# Patient Record
Sex: FEMALE | Race: WHITE | NOT HISPANIC OR LATINO | Employment: STUDENT | URBAN - NONMETROPOLITAN AREA
[De-identification: names, ages, dates, MRNs, and addresses within clinical notes are randomized per-mention and may not be internally consistent; named-entity substitution may affect disease eponyms.]

---

## 2021-05-10 ENCOUNTER — OFFICE VISIT (OUTPATIENT)
Dept: CARDIOLOGY | Facility: CLINIC | Age: 21
End: 2021-05-10

## 2021-05-10 VITALS
BODY MASS INDEX: 45.99 KG/M2 | DIASTOLIC BLOOD PRESSURE: 74 MMHG | HEART RATE: 86 BPM | SYSTOLIC BLOOD PRESSURE: 128 MMHG | WEIGHT: 293 LBS | OXYGEN SATURATION: 98 % | HEIGHT: 67 IN

## 2021-05-10 DIAGNOSIS — R07.2 PRECORDIAL PAIN: Primary | ICD-10-CM

## 2021-05-10 DIAGNOSIS — E66.01 MORBID OBESITY (HCC): ICD-10-CM

## 2021-05-10 DIAGNOSIS — R00.2 PALPITATIONS: ICD-10-CM

## 2021-05-10 DIAGNOSIS — R06.02 SOB (SHORTNESS OF BREATH): ICD-10-CM

## 2021-05-10 DIAGNOSIS — E78.5 DYSLIPIDEMIA: ICD-10-CM

## 2021-05-10 PROCEDURE — 99204 OFFICE O/P NEW MOD 45 MIN: CPT | Performed by: INTERNAL MEDICINE

## 2021-05-10 PROCEDURE — 93000 ELECTROCARDIOGRAM COMPLETE: CPT | Performed by: INTERNAL MEDICINE

## 2021-05-10 RX ORDER — FERROUS SULFATE 325(65) MG
1 TABLET ORAL DAILY
COMMUNITY
Start: 2021-05-02 | End: 2022-02-02

## 2021-05-10 RX ORDER — PANTOPRAZOLE SODIUM 40 MG/1
40 TABLET, DELAYED RELEASE ORAL DAILY
COMMUNITY
Start: 2021-04-26 | End: 2022-01-27 | Stop reason: SDUPTHER

## 2021-05-10 RX ORDER — BACLOFEN 10 MG/1
TABLET ORAL EVERY 12 HOURS SCHEDULED
COMMUNITY
Start: 2019-09-27 | End: 2022-02-02

## 2021-05-10 RX ORDER — LORATADINE 10 MG/1
10 TABLET ORAL DAILY
COMMUNITY
Start: 2021-02-25 | End: 2022-01-27 | Stop reason: SDUPTHER

## 2021-05-10 RX ORDER — CLINDAMYCIN PHOSPHATE 10 MG/G
GEL TOPICAL
COMMUNITY
Start: 2021-02-25 | End: 2022-02-02

## 2021-05-10 RX ORDER — NORGESTIMATE AND ETHINYL ESTRADIOL 7DAYSX3 28
KIT ORAL
COMMUNITY
Start: 2019-09-27 | End: 2022-01-27 | Stop reason: SDUPTHER

## 2021-05-10 RX ORDER — IBUPROFEN 800 MG/1
TABLET ORAL EVERY 12 HOURS SCHEDULED
COMMUNITY
Start: 2019-09-27 | End: 2022-03-31

## 2021-05-10 RX ORDER — FERROUS SULFATE 325(65) MG
1 TABLET ORAL 2 TIMES DAILY
COMMUNITY
Start: 2021-03-02 | End: 2022-01-27

## 2021-05-10 NOTE — PROGRESS NOTES
Subjective:     Encounter Date:05/10/2021      Patient ID: Elayne Hernandez is a 20 y.o. female.    Chief Complaint: Chest pain  HPI  This is a 20-year-old female patient who has been experiencing chest discomfort off and on since 2019.  The patient describes a retrosternal tightness which does not radiate.  The discomfort occurs on a daily basis.  It typically has a 3/10 intensity.  The discomfort is associated with shortness of breath and lightheadedness.  The discomfort occurs mostly with exertional activities and improves with rest.  The discomfort will last anywhere from a few minutes to several hours.  The discomfort has been occurring on a daily basis and will often occur multiple times per day.  The patient also reports shortness of breath with activity also beginning in 2019.  The patient indicates the shortness of breath is worse with exertional activities and improves with rest.  She has had no orthopnea PND or lower extremity edema.  The patient reports having palpitations for quite some time.  She cannot recall the first time she had palpitations but they have gotten much more severe since March of this year.  She describes a sense that her heart is racing and skipping beats.  It tends to occur at random lasting for 1 to 2 minutes.  It tends to improve when she lays down.  She has no history of documented arrhythmia and has never worn a cardiac monitor.  She has no history of hypertension diabetes or dyslipidemia.  She is a non-smoker.  The following portions of the patient's history were reviewed and updated as appropriate: allergies, current medications, past family history, past medical history, past social history, past surgical history and problem  Review of Systems   Constitutional: Negative for chills, diaphoresis, fever, malaise/fatigue, weight gain and weight loss.   HENT: Negative for ear discharge, hearing loss, hoarse voice and nosebleeds.    Eyes: Negative for discharge, double vision,  pain and photophobia.   Cardiovascular: Positive for chest pain, dyspnea on exertion, irregular heartbeat and palpitations. Negative for claudication, cyanosis, leg swelling, near-syncope, orthopnea, paroxysmal nocturnal dyspnea and syncope.   Respiratory: Negative for cough, hemoptysis, sputum production and wheezing.    Endocrine: Negative for cold intolerance, heat intolerance, polydipsia, polyphagia and polyuria.   Hematologic/Lymphatic: Negative for adenopathy and bleeding problem. Does not bruise/bleed easily.   Skin: Negative for color change, flushing, itching and rash.   Musculoskeletal: Negative for muscle cramps, muscle weakness, myalgias and stiffness.   Gastrointestinal: Negative for abdominal pain, diarrhea, hematemesis, hematochezia, nausea and vomiting.   Genitourinary: Negative for dysuria, frequency and nocturia.   Neurological: Positive for dizziness and light-headedness. Negative for focal weakness, loss of balance, numbness, paresthesias and seizures.   Psychiatric/Behavioral: Negative for altered mental status, hallucinations and suicidal ideas.   Allergic/Immunologic: Negative for HIV exposure, hives and persistent infections.           Current Outpatient Medications:   •  baclofen (LIORESAL) 10 MG tablet, Take  by mouth Every 12 (Twelve) Hours., Disp: , Rfl:   •  clindamycin (CLINDAGEL) 1 % gel, APPLY A THIN LAYER TOPICALLY TO AFFECTED AREA TWICE DAILY, Disp: , Rfl:   •  FeroSul 325 (65 Fe) MG tablet, Take 1 tablet by mouth 2 (Two) Times a Day., Disp: , Rfl:   •  ibuprofen (ADVIL,MOTRIN) 800 MG tablet, Take  by mouth Every 12 (Twelve) Hours., Disp: , Rfl:   •  loratadine (CLARITIN) 10 MG tablet, Take 10 mg by mouth Daily., Disp: , Rfl:   •  norgestimate-ethinyl estradiol (Tri-Sprintec) 0.18/0.215/0.25 MG-35 MCG per tablet, Take  by mouth., Disp: , Rfl:   •  pantoprazole (PROTONIX) 40 MG EC tablet, Take 40 mg by mouth Daily., Disp: , Rfl:   •  sertraline (ZOLOFT) 50 MG tablet, Take 50 mg by  "mouth Daily., Disp: , Rfl:   •  Vitamin D3 Maximum Strength 125 MCG (5000 UT) capsule capsule, Take 1 capsule by mouth Daily., Disp: , Rfl:     Objective:   Vitals and nursing note reviewed.   Constitutional:       Appearance: Healthy appearance. Not in distress.   Neck:      Vascular: No JVR. JVD normal.   Pulmonary:      Effort: Pulmonary effort is normal.      Breath sounds: Normal breath sounds. No wheezing. No rhonchi. No rales.   Chest:      Chest wall: Not tender to palpatation.   Cardiovascular:      PMI at left midclavicular line. Normal rate. Regular rhythm. Normal S1. Normal S2.      Murmurs: There is no murmur.      No gallop. No click. No rub.   Pulses:     Intact distal pulses.   Edema:     Peripheral edema absent.   Abdominal:      General: Bowel sounds are normal.      Palpations: Abdomen is soft.      Tenderness: There is no abdominal tenderness.   Musculoskeletal: Normal range of motion.         General: No tenderness. Skin:     General: Skin is warm and dry.   Neurological:      General: No focal deficit present.      Mental Status: Alert and oriented to person, place and time.       Blood pressure 128/74, pulse 86, height 170.2 cm (67\"), weight 133 kg (294 lb), SpO2 98 %.   Lab Review:     Assessment:       1. Precordial pain  Atypical chest pain.  - Treadmill Stress Test  - Adult Transthoracic Echo Complete W/ Cont if Necessary Per Protocol    2. Dyslipidemia  The patient has a history of elevated triglycerides but is not on any medication.  - Treadmill Stress Test  - Adult Transthoracic Echo Complete W/ Cont if Necessary Per Protocol    3. SOB (shortness of breath)  Multifactorial.  - Treadmill Stress Test  - Adult Transthoracic Echo Complete W/ Cont if Necessary Per Protocol    4. Palpitations  Possible arrhythmia versus symptomatic ectopy.  - Treadmill Stress Test  - Adult Transthoracic Echo Complete W/ Cont if Necessary Per Protocol  - Holter Monitor - 72 Hour Up To 15 Days    5. Morbid " obesity (CMS/HCC)  Body mass index is greater than 46.  The obesity pattern is truncal.  This is due to excess calorie intake.      ECG 12 Lead    Date/Time: 5/10/2021 11:17 AM  Performed by: Diego Coyne MD  Authorized by: Diego Coyne MD   Previous ECG: no previous ECG available  Rhythm: sinus rhythm  Rate: normal  QRS axis: normal    Clinical impression: normal ECG            Plan:     I have recommended a treadmill exercise stress test.  I have recommended an echocardiogram.  I have recommended an outpatient cardiac monitor.  No changes to her medications have been made at today's visit.  Further recommendations will be predicated on the results of her outpatient testing results.

## 2021-07-01 LAB
BH CV STRESS BP STAGE 1: NORMAL
BH CV STRESS DURATION MIN STAGE 1: 3
BH CV STRESS DURATION MIN STAGE 2: 3
BH CV STRESS DURATION SEC STAGE 1: 0
BH CV STRESS DURATION SEC STAGE 2: 0
BH CV STRESS GRADE STAGE 1: 10
BH CV STRESS GRADE STAGE 2: 12
BH CV STRESS HR STAGE 1: 170
BH CV STRESS HR STAGE 2: 186
BH CV STRESS METS STAGE 1: 5
BH CV STRESS METS STAGE 2: 7.5
BH CV STRESS O2 STAGE 1: 98
BH CV STRESS O2 STAGE 2: 96
BH CV STRESS PROTOCOL 1: NORMAL
BH CV STRESS RECOVERY BP: NORMAL MMHG
BH CV STRESS RECOVERY HR: 116 BPM
BH CV STRESS RECOVERY O2: 97 %
BH CV STRESS SPEED STAGE 1: 1.7
BH CV STRESS SPEED STAGE 2: 2.5
BH CV STRESS STAGE 1: 1
BH CV STRESS STAGE 2: 2
MAXIMAL PREDICTED HEART RATE: 201 BPM
PERCENT MAX PREDICTED HR: 92.54 %
STRESS BASELINE BP: NORMAL MMHG
STRESS BASELINE HR: 103 BPM
STRESS O2 SAT REST: 98 %
STRESS PERCENT HR: 109 %
STRESS POST ESTIMATED WORKLOAD: 7 METS
STRESS POST EXERCISE DUR MIN: 4 MIN
STRESS POST EXERCISE DUR SEC: 25 SEC
STRESS POST O2 SAT PEAK: 96 %
STRESS POST PEAK BP: NORMAL MMHG
STRESS POST PEAK HR: 186 BPM
STRESS TARGET HR: 171 BPM

## 2021-07-17 ENCOUNTER — HOSPITAL ENCOUNTER (EMERGENCY)
Facility: HOSPITAL | Age: 21
Discharge: HOME | End: 2021-07-17
Attending: EMERGENCY MEDICINE | Admitting: EMERGENCY MEDICINE

## 2021-07-17 VITALS
TEMPERATURE: 98.4 F | SYSTOLIC BLOOD PRESSURE: 155 MMHG | BODY MASS INDEX: 45.99 KG/M2 | RESPIRATION RATE: 16 BRPM | HEART RATE: 83 BPM | HEIGHT: 67 IN | WEIGHT: 293 LBS | DIASTOLIC BLOOD PRESSURE: 97 MMHG | OXYGEN SATURATION: 99 %

## 2021-07-17 DIAGNOSIS — Z76.0 MEDICATION REFILL: Primary | ICD-10-CM

## 2021-07-17 PROCEDURE — 99282 EMERGENCY DEPT VISIT SF MDM: CPT

## 2021-07-17 NOTE — ED PROVIDER NOTES
Subjective   History of Present Illness  Is a 20-year-old female that comes in today complaining of needing a medication refill.  She reports she has been out of her Zoloft for the past 3 days.  She is tearful during her interview.  She denies any homicidal or suicidal ideations.  Review of Systems   Constitutional: Negative.    HENT: Negative.    Eyes: Negative.    Respiratory: Negative.    Cardiovascular: Negative.    Gastrointestinal: Negative.    Genitourinary: Negative.    Skin: Negative.    Neurological: Negative.    Psychiatric/Behavioral: Negative.        Past Medical History:   Diagnosis Date   • Depression        Allergies   Allergen Reactions   • Augmentin [Amoxicillin-Pot Clavulanate] Rash   • Levofloxacin Rash       History reviewed. No pertinent surgical history.    History reviewed. No pertinent family history.    Social History     Socioeconomic History   • Marital status:      Spouse name: Not on file   • Number of children: Not on file   • Years of education: Not on file   • Highest education level: Not on file   Tobacco Use   • Smoking status: Never Smoker   • Smokeless tobacco: Never Used   Substance and Sexual Activity   • Alcohol use: Never   • Drug use: Never   • Sexual activity: Defer           Objective   Physical Exam  Vitals and nursing note reviewed.   Constitutional:       Appearance: Normal appearance. She is normal weight.   Neurological:      Mental Status: She is alert.     GEN: No acute distress  Head: Normocephalic, atraumatic  Eyes: Pupils equal round reactive to light  ENT: Posterior pharynx normal in appearance, oral mucosa is moist  Chest: Nontender to palpation  Cardiovascular: Regular rate  Lungs: Clear to auscultation bilaterally  Abdomen: Soft, nontender, nondistended, no peritoneal signs  Extremities: No edema, normal appearance  Neuro: GCS 15  Psych: Mood and affect are appropriate      Procedures           ED Course                                            MDM  Number of Diagnoses or Management Options     Amount and/or Complexity of Data Reviewed  Review and summarize past medical records: yes  Discuss the patient with other providers: yes    Risk of Complications, Morbidity, and/or Mortality  Presenting problems: low  Diagnostic procedures: minimal  Management options: low        Final diagnoses:   Medication refill       ED Disposition  ED Disposition     ED Disposition Condition Comment    Discharge Stable           Shanae Uriostegui, VALENTINE  [address] [address] [phone]    Schedule an appointment as soon as possible for a visit            Where to Get Your Medications      These medications were sent to [facility] [address] [place] [address] - [address] [place] [address] [phone] [facility] [phone]   [address] [place] [id number]    Phone: [phone]   · sertraline 50 MG tablet        Medication List      No changes were made to your prescriptions during this visit.          Cecille Barone, APRN  07/17/21 1236

## 2021-07-26 LAB
MAXIMAL PREDICTED HEART RATE: 201 BPM
STRESS TARGET HR: 171 BPM

## 2021-09-02 LAB
BH CV ECHO MEAS - % IVS THICK: 50 %
BH CV ECHO MEAS - % LVPW THICK: 36.4 %
BH CV ECHO MEAS - AO MAX PG (FULL): 5.1 MMHG
BH CV ECHO MEAS - AO MAX PG: 9 MMHG
BH CV ECHO MEAS - AO MEAN PG (FULL): 3 MMHG
BH CV ECHO MEAS - AO MEAN PG: 5 MMHG
BH CV ECHO MEAS - AO ROOT AREA (BSA CORRECTED): 0.8
BH CV ECHO MEAS - AO ROOT AREA: 2.8 CM^2
BH CV ECHO MEAS - AO ROOT DIAM: 1.9 CM
BH CV ECHO MEAS - AO V2 MAX: 147.5 CM/SEC
BH CV ECHO MEAS - AO V2 MEAN: 101 CM/SEC
BH CV ECHO MEAS - AO V2 VTI: 28.5 CM
BH CV ECHO MEAS - AVA(I,A): 1.9 CM^2
BH CV ECHO MEAS - AVA(I,D): 1.9 CM^2
BH CV ECHO MEAS - AVA(V,A): 2.1 CM^2
BH CV ECHO MEAS - AVA(V,D): 2.1 CM^2
BH CV ECHO MEAS - BSA(HAYCOCK): 2.6 M^2
BH CV ECHO MEAS - BSA: 2.4 M^2
BH CV ECHO MEAS - BZI_BMI: 45.9 KILOGRAMS/M^2
BH CV ECHO MEAS - BZI_METRIC_HEIGHT: 170.2 CM
BH CV ECHO MEAS - BZI_METRIC_WEIGHT: 132.9 KG
BH CV ECHO MEAS - EDV(CUBED): 136.6 ML
BH CV ECHO MEAS - EDV(MOD-SP4): 178 ML
BH CV ECHO MEAS - EDV(TEICH): 126.6 ML
BH CV ECHO MEAS - EF(CUBED): 74.9 %
BH CV ECHO MEAS - EF(MOD-SP4): 62.9 %
BH CV ECHO MEAS - EF(TEICH): 66.4 %
BH CV ECHO MEAS - ESV(CUBED): 34.3 ML
BH CV ECHO MEAS - ESV(MOD-SP4): 66 ML
BH CV ECHO MEAS - ESV(TEICH): 42.5 ML
BH CV ECHO MEAS - FS: 36.9 %
BH CV ECHO MEAS - IVS/LVPW: 0.82
BH CV ECHO MEAS - IVSD: 0.9 CM
BH CV ECHO MEAS - IVSS: 1.4 CM
BH CV ECHO MEAS - LA DIMENSION: 4 CM
BH CV ECHO MEAS - LA/AO: 2.1
BH CV ECHO MEAS - LAD MAJOR: 5.6 CM
BH CV ECHO MEAS - LAT PEAK E' VEL: 17.8 CM/SEC
BH CV ECHO MEAS - LATERAL E/E' RATIO: 5
BH CV ECHO MEAS - LV DIASTOLIC VOL/BSA (35-75): 74.8 ML/M^2
BH CV ECHO MEAS - LV IVRT: 0.12 SEC
BH CV ECHO MEAS - LV MASS(C)D: 191.1 GRAMS
BH CV ECHO MEAS - LV MASS(C)DI: 80.3 GRAMS/M^2
BH CV ECHO MEAS - LV MASS(C)S: 160.9 GRAMS
BH CV ECHO MEAS - LV MASS(C)SI: 67.6 GRAMS/M^2
BH CV ECHO MEAS - LV MAX PG: 3.9 MMHG
BH CV ECHO MEAS - LV MEAN PG: 2 MMHG
BH CV ECHO MEAS - LV SYSTOLIC VOL/BSA (12-30): 27.8 ML/M^2
BH CV ECHO MEAS - LV V1 MAX: 98.8 CM/SEC
BH CV ECHO MEAS - LV V1 MEAN: 57.7 CM/SEC
BH CV ECHO MEAS - LV V1 VTI: 17.2 CM
BH CV ECHO MEAS - LVIDD: 5.2 CM
BH CV ECHO MEAS - LVIDS: 3.3 CM
BH CV ECHO MEAS - LVLD AP4: 9.1 CM
BH CV ECHO MEAS - LVLS AP4: 7.5 CM
BH CV ECHO MEAS - LVOT AREA (M): 3.1 CM^2
BH CV ECHO MEAS - LVOT AREA: 3.1 CM^2
BH CV ECHO MEAS - LVOT DIAM: 2 CM
BH CV ECHO MEAS - LVPWD: 1.1 CM
BH CV ECHO MEAS - LVPWS: 1.5 CM
BH CV ECHO MEAS - MED PEAK E' VEL: 9.8 CM/SEC
BH CV ECHO MEAS - MEDIAL E/E' RATIO: 9.1
BH CV ECHO MEAS - MV A MAX VEL: 51.6 CM/SEC
BH CV ECHO MEAS - MV DEC SLOPE: 396.5 CM/SEC^2
BH CV ECHO MEAS - MV DEC TIME: 0.21 SEC
BH CV ECHO MEAS - MV E MAX VEL: 89.4 CM/SEC
BH CV ECHO MEAS - MV E/A: 1.7
BH CV ECHO MEAS - MV MAX PG: 4.2 MMHG
BH CV ECHO MEAS - MV MEAN PG: 2 MMHG
BH CV ECHO MEAS - MV P1/2T MAX VEL: 84.6 CM/SEC
BH CV ECHO MEAS - MV P1/2T: 62.5 MSEC
BH CV ECHO MEAS - MV V2 MAX: 102 CM/SEC
BH CV ECHO MEAS - MV V2 MEAN: 62.1 CM/SEC
BH CV ECHO MEAS - MV V2 VTI: 27.4 CM
BH CV ECHO MEAS - MVA P1/2T LCG: 2.6 CM^2
BH CV ECHO MEAS - MVA(P1/2T): 3.5 CM^2
BH CV ECHO MEAS - MVA(VTI): 2 CM^2
BH CV ECHO MEAS - PA MAX PG: 2.2 MMHG
BH CV ECHO MEAS - PA V2 MAX: 73.6 CM/SEC
BH CV ECHO MEAS - RAP SYSTOLE: 3 MMHG
BH CV ECHO MEAS - RVSP: 23 MMHG
BH CV ECHO MEAS - SI(AO): 34 ML/M^2
BH CV ECHO MEAS - SI(CUBED): 43 ML/M^2
BH CV ECHO MEAS - SI(LVOT): 22.7 ML/M^2
BH CV ECHO MEAS - SI(MOD-SP4): 47.1 ML/M^2
BH CV ECHO MEAS - SI(TEICH): 35.4 ML/M^2
BH CV ECHO MEAS - SV(AO): 80.8 ML
BH CV ECHO MEAS - SV(CUBED): 102.3 ML
BH CV ECHO MEAS - SV(LVOT): 54 ML
BH CV ECHO MEAS - SV(MOD-SP4): 112 ML
BH CV ECHO MEAS - SV(TEICH): 84.1 ML
BH CV ECHO MEAS - TR MAX PG: 20 MMHG
BH CV ECHO MEAS - TR MAX VEL: 221.5 CM/SEC
BH CV ECHO MEAS - TV MAX PG: 2.3 MMHG
BH CV ECHO MEAS - TV V2 MAX: 75.6 CM/SEC
BH CV ECHO MEASUREMENTS AVERAGE E/E' RATIO: 6.48
LEFT ATRIUM VOLUME INDEX: 17.7 ML/M^2
LEFT ATRIUM VOLUME: 42 ML
LV EF 2D ECHO EST: 66 %

## 2022-01-27 ENCOUNTER — OFFICE VISIT (OUTPATIENT)
Dept: INTERNAL MEDICINE | Facility: CLINIC | Age: 22
End: 2022-01-27

## 2022-01-27 VITALS
DIASTOLIC BLOOD PRESSURE: 82 MMHG | OXYGEN SATURATION: 98 % | WEIGHT: 293 LBS | HEIGHT: 67 IN | TEMPERATURE: 98.4 F | SYSTOLIC BLOOD PRESSURE: 128 MMHG | BODY MASS INDEX: 45.99 KG/M2 | HEART RATE: 109 BPM

## 2022-01-27 DIAGNOSIS — D50.9 IRON DEFICIENCY ANEMIA, UNSPECIFIED IRON DEFICIENCY ANEMIA TYPE: ICD-10-CM

## 2022-01-27 DIAGNOSIS — Z76.89 ENCOUNTER TO ESTABLISH CARE: Primary | ICD-10-CM

## 2022-01-27 DIAGNOSIS — M79.7 FIBROMYALGIA: ICD-10-CM

## 2022-01-27 DIAGNOSIS — F43.10 PTSD (POST-TRAUMATIC STRESS DISORDER): ICD-10-CM

## 2022-01-27 DIAGNOSIS — Z23 NEED FOR COVID-19 VACCINE: ICD-10-CM

## 2022-01-27 DIAGNOSIS — E66.01 MORBID OBESITY: ICD-10-CM

## 2022-01-27 DIAGNOSIS — F33.1 MODERATE EPISODE OF RECURRENT MAJOR DEPRESSIVE DISORDER: ICD-10-CM

## 2022-01-27 DIAGNOSIS — E78.5 DYSLIPIDEMIA: ICD-10-CM

## 2022-01-27 DIAGNOSIS — R41.840 CONCENTRATION DEFICIT: ICD-10-CM

## 2022-01-27 DIAGNOSIS — Z30.011 ENCOUNTER FOR INITIAL PRESCRIPTION OF CONTRACEPTIVE PILLS: ICD-10-CM

## 2022-01-27 LAB
B-HCG UR QL: NEGATIVE
EXPIRATION DATE: NORMAL
INTERNAL NEGATIVE CONTROL: NORMAL
INTERNAL POSITIVE CONTROL: NORMAL
Lab: NORMAL

## 2022-01-27 PROCEDURE — 0001A COVID-19 (PFIZER): CPT | Performed by: NURSE PRACTITIONER

## 2022-01-27 PROCEDURE — 81025 URINE PREGNANCY TEST: CPT | Performed by: NURSE PRACTITIONER

## 2022-01-27 PROCEDURE — 99204 OFFICE O/P NEW MOD 45 MIN: CPT | Performed by: NURSE PRACTITIONER

## 2022-01-27 PROCEDURE — 91300 COVID-19 (PFIZER): CPT | Performed by: NURSE PRACTITIONER

## 2022-01-27 RX ORDER — NORGESTIMATE AND ETHINYL ESTRADIOL 7DAYSX3 28
1 KIT ORAL DAILY
Qty: 28 TABLET | Refills: 12 | Status: SHIPPED | OUTPATIENT
Start: 2022-01-27 | End: 2023-03-23 | Stop reason: SDUPTHER

## 2022-01-27 RX ORDER — PANTOPRAZOLE SODIUM 20 MG/1
40 TABLET, DELAYED RELEASE ORAL DAILY
Qty: 90 TABLET | Refills: 1 | Status: SHIPPED | OUTPATIENT
Start: 2022-01-27 | End: 2022-06-23 | Stop reason: SDUPTHER

## 2022-01-27 RX ORDER — LORATADINE 10 MG/1
10 TABLET ORAL DAILY
Qty: 90 TABLET | Refills: 3 | Status: SHIPPED | OUTPATIENT
Start: 2022-01-27 | End: 2023-01-20

## 2022-01-27 RX ORDER — DULOXETIN HYDROCHLORIDE 30 MG/1
30 CAPSULE, DELAYED RELEASE ORAL DAILY
Qty: 30 CAPSULE | Refills: 1 | Status: SHIPPED | OUTPATIENT
Start: 2022-01-27 | End: 2022-03-01 | Stop reason: SDUPTHER
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
## 2022-01-28 LAB
ALBUMIN SERPL-MCNC: 4.5 G/DL (ref 3.5–5.2)
ALBUMIN/GLOB SERPL: 1.8 G/DL
ALP SERPL-CCNC: 77 U/L (ref 39–117)
ALT SERPL-CCNC: 22 U/L (ref 1–33)
AST SERPL-CCNC: 11 U/L (ref 1–32)
BILIRUB SERPL-MCNC: 0.3 MG/DL (ref 0–1.2)
BUN SERPL-MCNC: 10 MG/DL (ref 6–20)
BUN/CREAT SERPL: 17.2 (ref 7–25)
CALCIUM SERPL-MCNC: 9.6 MG/DL (ref 8.6–10.5)
CHLORIDE SERPL-SCNC: 104 MMOL/L (ref 98–107)
CO2 SERPL-SCNC: 21.7 MMOL/L (ref 22–29)
CREAT SERPL-MCNC: 0.58 MG/DL (ref 0.57–1)
ERYTHROCYTE [DISTWIDTH] IN BLOOD BY AUTOMATED COUNT: 12.9 % (ref 12.3–15.4)
FERRITIN SERPL-MCNC: 101 NG/ML (ref 13–150)
GLOBULIN SER CALC-MCNC: 2.5 GM/DL
GLUCOSE SERPL-MCNC: 107 MG/DL (ref 65–99)
HCT VFR BLD AUTO: 51 % (ref 34–46.6)
HCV AB S/CO SERPL IA: <0.1 S/CO RATIO (ref 0–0.9)
HGB BLD-MCNC: 16.7 G/DL (ref 12–15.9)
IRON SATN MFR SERPL: 20 % (ref 20–50)
IRON SERPL-MCNC: 84 MCG/DL (ref 37–145)
MCH RBC QN AUTO: 28.1 PG (ref 26.6–33)
MCHC RBC AUTO-ENTMCNC: 32.7 G/DL (ref 31.5–35.7)
MCV RBC AUTO: 85.9 FL (ref 79–97)
PLATELET # BLD AUTO: 328 10*3/MM3 (ref 140–450)
POTASSIUM SERPL-SCNC: 4.7 MMOL/L (ref 3.5–5.2)
PROT SERPL-MCNC: 7 G/DL (ref 6–8.5)
RBC # BLD AUTO: 5.94 10*6/MM3 (ref 3.77–5.28)
SODIUM SERPL-SCNC: 141 MMOL/L (ref 136–145)
TIBC SERPL-MCNC: 413 MCG/DL
UIBC SERPL-MCNC: 329 MCG/DL (ref 112–346)
WBC # BLD AUTO: 9.64 10*3/MM3 (ref 3.4–10.8)

## 2022-01-31 ENCOUNTER — TELEPHONE (OUTPATIENT)
Dept: INTERNAL MEDICINE | Facility: CLINIC | Age: 22
End: 2022-01-31

## 2022-02-22 ENCOUNTER — HOSPITAL ENCOUNTER (EMERGENCY)
Facility: HOSPITAL | Age: 22
Discharge: HOME | End: 2022-02-22
Attending: EMERGENCY MEDICINE | Admitting: EMERGENCY MEDICINE

## 2022-02-22 ENCOUNTER — APPOINTMENT (OUTPATIENT)
Dept: GENERAL RADIOLOGY | Facility: HOSPITAL | Age: 22
End: 2022-02-22

## 2022-02-22 VITALS
SYSTOLIC BLOOD PRESSURE: 148 MMHG | RESPIRATION RATE: 18 BRPM | DIASTOLIC BLOOD PRESSURE: 98 MMHG | OXYGEN SATURATION: 95 % | WEIGHT: 293 LBS | TEMPERATURE: 99.4 F | BODY MASS INDEX: 45.99 KG/M2 | HEART RATE: 93 BPM | HEIGHT: 67 IN

## 2022-02-22 DIAGNOSIS — S90.512A ABRASION, LEFT ANKLE, INITIAL ENCOUNTER: ICD-10-CM

## 2022-02-22 DIAGNOSIS — S93.402A MODERATE LEFT ANKLE SPRAIN, INITIAL ENCOUNTER: Primary | ICD-10-CM

## 2022-02-22 PROCEDURE — 73610 X-RAY EXAM OF ANKLE: CPT

## 2022-02-22 PROCEDURE — 99283 EMERGENCY DEPT VISIT LOW MDM: CPT

## 2022-02-22 PROCEDURE — 73590 X-RAY EXAM OF LOWER LEG: CPT

## 2022-02-22 RX ORDER — INDOMETHACIN 50 MG/1
50 CAPSULE ORAL
Qty: 20 CAPSULE | Refills: 0 | Status: SHIPPED | OUTPATIENT
Start: 2022-02-22 | End: 2022-03-31

## 2022-02-22 RX ORDER — HYDROCODONE BITARTRATE AND ACETAMINOPHEN 5; 325 MG/1; MG/1
1 TABLET ORAL ONCE
Status: COMPLETED | OUTPATIENT
Start: 2022-02-22 | End: 2022-02-22

## 2022-02-22 RX ADMIN — HYDROCODONE BITARTRATE AND ACETAMINOPHEN 1 TABLET: 5; 325 TABLET ORAL at 19:12

## 2022-02-22 NOTE — ED PROVIDER NOTES
Subjective   21-year-old female brought into the emergency department chief complaint fall just prior to arrival. Patient states she slipped and fell down several steps at her house. Patient complains of left ankle and left lower extremity pain. Patient did sustain a large abrasion to the anterior part of her ankle. Patient denies any other associated complaints at this time.      History provided by:  Patient   used: No    Fall  Mechanism of injury: fall    Injury location:  Leg  Leg injury location:  L leg and L ankle  Incident location:  Home  Time since incident:  2 hours  Arrived directly from scene: no    Fall:     Fall occurred:  Standing    Impact surface:  Hard floor    Entrapped after fall: no    Suspicion of alcohol use: no    Suspicion of drug use: no    Tetanus status:  Unknown  Prior to arrival data:     Bystander interventions:  None    Patient ambulatory at scene: no      Blood loss:  None    Responsiveness at scene:  Alert    Orientation at scene:  Person, place, situation and time    Loss of consciousness: no      Amnesic to event: no      Airway interventions:  None    Breathing interventions:  None    IV access status:  None    IO access:  None    Fluids administered:  None    Cardiac interventions:  None    Medications administered:  None    Immobilization:  None  Associated symptoms: no abdominal pain, no back pain, no blindness, no chest pain, no headaches, no hearing loss, no loss of consciousness, no neck pain, no seizures and no vomiting    Risk factors: no anticoagulation therapy, no beta blocker therapy, no CABG, no COPD, no diabetes, no dialysis, no hemophilia, no pacemaker, no past MI, not pregnant and no steroid use        Review of Systems   Constitutional: Negative.  Negative for appetite change, chills and diaphoresis.   HENT: Negative.  Negative for hearing loss.    Eyes: Negative.  Negative for blindness, photophobia, pain, discharge, redness and itching.    Respiratory: Negative.  Negative for apnea, cough, choking, chest tightness and shortness of breath.    Cardiovascular: Negative.  Negative for chest pain.   Gastrointestinal: Negative.  Negative for abdominal distention, abdominal pain and vomiting.   Genitourinary: Negative.  Negative for difficulty urinating, dyspareunia, dysuria, enuresis, flank pain, frequency, genital sores and hematuria.   Musculoskeletal: Positive for arthralgias and myalgias. Negative for back pain and neck pain.   Skin: Positive for rash and wound. Negative for color change and pallor.   Neurological: Negative.  Negative for seizures, loss of consciousness and headaches.   Hematological: Negative.    Psychiatric/Behavioral: Negative.  Negative for behavioral problems, confusion, decreased concentration, dysphoric mood, hallucinations and self-injury. The patient is not nervous/anxious and is not hyperactive.    All other systems reviewed and are negative.      Past Medical History:   Diagnosis Date   • Anemia     iron deficiency    • Depression    • Fibromyalgia, primary    • Gall stones    • GERD (gastroesophageal reflux disease)    • Migraine        Allergies   Allergen Reactions   • Augmentin [Amoxicillin-Pot Clavulanate] Rash   • Levofloxacin Rash       Past Surgical History:   Procedure Laterality Date   • ADENOIDECTOMY     • CHOLECYSTECTOMY  06/2018   • TONSILLECTOMY     • WISDOM TOOTH EXTRACTION         Family History   Problem Relation Age of Onset   • Arthritis Mother    • Hypertension Mother    • Hyperlipidemia Mother    • Mental illness Mother    • Obesity Mother    • Diabetes Father    • Hypertension Father    • Hyperlipidemia Father    • Lung cancer Maternal Grandfather    • Heart attack Maternal Grandfather    • Diabetes Paternal Grandfather        Social History     Socioeconomic History   • Marital status:    Tobacco Use   • Smoking status: Never Smoker   • Smokeless tobacco: Never Used   Vaping Use   • Vaping Use:  Every day   • Substances: Nicotine   • Devices: Disposable   Substance and Sexual Activity   • Alcohol use: Never   • Drug use: Never   • Sexual activity: Defer           Objective   Physical Exam  Vitals and nursing note reviewed.   Constitutional:       General: She is not in acute distress.     Appearance: Normal appearance. She is normal weight. She is not ill-appearing, toxic-appearing or diaphoretic.   HENT:      Head: Normocephalic and atraumatic.      Right Ear: Tympanic membrane, ear canal and external ear normal. There is no impacted cerumen.      Left Ear: Tympanic membrane, ear canal and external ear normal. There is no impacted cerumen.      Nose: Nose normal. No congestion or rhinorrhea.      Mouth/Throat:      Mouth: Mucous membranes are moist.      Pharynx: Oropharynx is clear. No oropharyngeal exudate or posterior oropharyngeal erythema.   Eyes:      General: No scleral icterus.        Right eye: No discharge.         Left eye: No discharge.      Extraocular Movements: Extraocular movements intact.      Conjunctiva/sclera: Conjunctivae normal.      Pupils: Pupils are equal, round, and reactive to light.   Neck:      Vascular: No carotid bruit.   Cardiovascular:      Rate and Rhythm: Normal rate and regular rhythm.      Pulses: Normal pulses.      Heart sounds: Normal heart sounds. No murmur heard.  No friction rub. No gallop.    Pulmonary:      Effort: Pulmonary effort is normal. No respiratory distress.      Breath sounds: No stridor. No wheezing, rhonchi or rales.   Chest:      Chest wall: No tenderness.   Abdominal:      General: Abdomen is flat. Bowel sounds are normal. There is no distension.      Palpations: Abdomen is soft. There is no mass.      Tenderness: There is no abdominal tenderness. There is no right CVA tenderness, left CVA tenderness, guarding or rebound.      Hernia: No hernia is present.   Musculoskeletal:         General: Swelling, tenderness and signs of injury present.       Cervical back: Normal range of motion. No rigidity or tenderness.      Left ankle: Tenderness present over the medial malleolus. Decreased range of motion.      Comments: Abrasion to anterior ankle.   Lymphadenopathy:      Cervical: No cervical adenopathy.   Skin:     General: Skin is warm and dry.      Coloration: Skin is not jaundiced or pale.      Findings: No bruising, erythema, lesion or rash.   Neurological:      General: No focal deficit present.      Mental Status: She is alert and oriented to person, place, and time. Mental status is at baseline.      Cranial Nerves: No cranial nerve deficit.      Sensory: No sensory deficit.      Motor: No weakness.      Coordination: Coordination normal.      Gait: Gait normal.   Psychiatric:         Mood and Affect: Mood normal.         Behavior: Behavior normal.         Thought Content: Thought content normal.         Judgment: Judgment normal.         Procedures           ED Course                                                 MDM    Final diagnoses:   Moderate left ankle sprain, initial encounter   Abrasion, left ankle, initial encounter       ED Disposition  ED Disposition     ED Disposition Condition Comment    Discharge Stable           Bashir Hamlin MD  [address]  [place] [id number] [phone]    Call in 1 day           Medication List      New Prescriptions    indomethacin 50 MG capsule  Commonly known as: INDOCIN  Take 1 capsule by mouth 3 (Three) Times a Day With Meals.           Where to Get Your Medications      These medications were sent to [facility] [address] [place] [address] - [address] [facility] - [phone]  - [phone]   [address] [facility] [address]    Phone: [phone]   · indomethacin 50 MG capsule          Alex Woodall PA-C  02/23/22 0082

## 2022-03-01 ENCOUNTER — OFFICE VISIT (OUTPATIENT)
Dept: INTERNAL MEDICINE | Facility: CLINIC | Age: 22
End: 2022-03-01

## 2022-03-01 VITALS
HEIGHT: 67 IN | OXYGEN SATURATION: 98 % | DIASTOLIC BLOOD PRESSURE: 82 MMHG | TEMPERATURE: 98.5 F | SYSTOLIC BLOOD PRESSURE: 130 MMHG | HEART RATE: 109 BPM | WEIGHT: 293 LBS | BODY MASS INDEX: 45.99 KG/M2

## 2022-03-01 DIAGNOSIS — D75.1 POLYCYTHEMIA: ICD-10-CM

## 2022-03-01 DIAGNOSIS — M79.7 FIBROMYALGIA: ICD-10-CM

## 2022-03-01 DIAGNOSIS — F33.1 MODERATE EPISODE OF RECURRENT MAJOR DEPRESSIVE DISORDER: Primary | ICD-10-CM

## 2022-03-01 DIAGNOSIS — M25.50 POLYARTHRALGIA: ICD-10-CM

## 2022-03-01 PROCEDURE — 99214 OFFICE O/P EST MOD 30 MIN: CPT | Performed by: NURSE PRACTITIONER

## 2022-03-01 RX ORDER — BACLOFEN 10 MG/1
10 TABLET ORAL 3 TIMES DAILY PRN
Qty: 90 TABLET | Refills: 1 | Status: SHIPPED | OUTPATIENT
Start: 2022-03-01

## 2022-03-01 RX ORDER — DULOXETIN HYDROCHLORIDE 30 MG/1
30 CAPSULE, DELAYED RELEASE ORAL DAILY
Qty: 30 CAPSULE | Refills: 0 | Status: SHIPPED | OUTPATIENT
Start: 2022-03-01 | End: 2022-03-03

## 2022-03-01 NOTE — PROGRESS NOTES
Office Visit      Patient Name: Elayne Hernandez  : 2000   MRN: [id number]   Care Team: Patient Care Team:  Noy Manzano APRN as PCP - General (Family Medicine)    Chief Complaint  Anxiety (1 month f/u, switched to Cymbalta, pt has been doing ok on the cymbalta denies any side effects ) and Depression    Subjective     Subjective      Elayne Hernandez presents to [facility] for anxiety and depression follow-up.   Switched to cymbalta from zoloft last visit. She reports the medication change is going well, she has had some nausea since starting the medication doesn't always eat with it. She reports her joint pain also seems to be improving although difficult to tell because she recently fell down the stairs and sprained her ankle. Denies suicidal/homicidal ideations. She is sleeping better but still feeling tired throughout the day. She has not been napping during the day anymore. She has an appointment to establish care with behavioral health in 2 days and has counseling appointment scheduled for May. From a mood standpoint she states she is feeling much better with her new medication.   Last visit hemoglobin was elevated. Her dad does have a rare polycythemia - hemoglobin hyden. She was diagnosed with fibromyalgia several years ago but she is unsure if a workup for autoimmune disorders have been performed on her.     Review of Systems   Constitutional: Positive for fatigue. Negative for appetite change and fever.   HENT: Negative for congestion, sore throat and trouble swallowing.    Eyes: Negative for blurred vision and visual disturbance.   Respiratory: Negative for cough, shortness of breath and wheezing.    Cardiovascular: Negative for chest pain, palpitations and leg swelling.   Gastrointestinal: Positive for nausea. Negative for abdominal pain, blood in stool, constipation and diarrhea.   Genitourinary: Negative for dysuria.   Musculoskeletal: Positive for  "arthralgias. Negative for back pain and myalgias.   Skin: Negative for rash.   Neurological: Negative for dizziness, weakness, light-headedness and headache.   Psychiatric/Behavioral: Positive for depressed mood. Negative for sleep disturbance and suicidal ideas. The patient is not nervous/anxious.        Objective     Objective   Vital Signs:   /82   Pulse 109   Temp 98.5 °F (36.9 °C) (Temporal)   Ht 170.2 cm (67\")   Wt 136 kg (298 lb 12.8 oz)   SpO2 98%   BMI 46.80 kg/m²     Physical Exam  Vitals and nursing note reviewed.   Constitutional:       General: She is not in acute distress.     Appearance: Normal appearance. She is obese. She is not ill-appearing or toxic-appearing.   Eyes:      Pupils: Pupils are equal, round, and reactive to light.   Neck:      Vascular: No carotid bruit.   Cardiovascular:      Rate and Rhythm: Regular rhythm. Tachycardia present.      Heart sounds: Normal heart sounds. No murmur heard.      Pulmonary:      Effort: Pulmonary effort is normal. No respiratory distress.      Breath sounds: Normal breath sounds. No wheezing.   Abdominal:      General: Bowel sounds are normal. There is no distension.      Palpations: Abdomen is soft.      Tenderness: There is no abdominal tenderness.   Musculoskeletal:         General: Swelling (left ankle due to recent injury. ) present.      Cervical back: Neck supple. No tenderness.   Skin:     General: Skin is warm and dry.      Findings: No rash.   Neurological:      General: No focal deficit present.      Mental Status: She is alert.   Psychiatric:         Mood and Affect: Mood normal.         Behavior: Behavior normal.          Assessment / Plan      Assessment/Plan   Problem List Items Addressed This Visit        Mental Health    Moderate episode of recurrent major depressive disorder (HCC) - Primary    Relevant Orders    CBC w AUTO Differential    Peripheral Blood Smear    SHANA With / DsDNA, RNP, Sjogrens A / B, Smith    Sedimentation " Rate    Improving with recent therapy adjustment. Will continue cymbalta at current dose for now, switch to night dosing to help with nausea side effect. Keep appointment with behavioral health and counseling for further management. Recommend medication compliance, stress management, exercise as tolerated, sleep hygiene, and discussed coping mechanisms.        Musculoskeletal and Injuries    Fibromyalgia    Relevant Orders    CBC w AUTO Differential    Peripheral Blood Smear    SHANA With / DsDNA, RNP, Sjogrens A / B, Smith    Sedimentation Rate    Pain has somewhat improved with cymbalta, continue current dose for now and consider increase if nausea is subsiding. Baclofen as needed for pain, has worked for her in the past and requesting refill.       Other Visit Diagnoses     Polycythemia        Relevant Orders    CBC w AUTO Differential    Peripheral Blood Smear    HSANA With / DsDNA, RNP, Sjogrens A / B, Smith    Sedimentation Rate    Further lab work as above today. Consider hematology referral if abnormal. Will attempt to identify other underlying causes of polycythemia and SHANA to be drawn to rule out autoimmune dyfunction.     Polyarthralgia        Relevant Orders    CBC w AUTO Differential    Peripheral Blood Smear    SHANA With / DsDNA, RNP, Sjogrens A / B, Smith    Sedimentation Rate    Continue cymbalta, baclofen as needed, and exercise as tolerated.            Follow Up   Return in about 6 weeks (around 4/12/2022) for Annual.  Patient was given instructions and counseling regarding her condition or for health maintenance advice. Please see specific information pulled into the AVS if appropriate.     VALENTINE Natarajan  [facility]

## 2022-03-03 ENCOUNTER — OFFICE VISIT (OUTPATIENT)
Dept: BEHAVIORAL HEALTH | Facility: CLINIC | Age: 22
End: 2022-03-03

## 2022-03-03 VITALS
BODY MASS INDEX: 45.99 KG/M2 | SYSTOLIC BLOOD PRESSURE: 122 MMHG | HEIGHT: 67 IN | DIASTOLIC BLOOD PRESSURE: 78 MMHG | WEIGHT: 293 LBS

## 2022-03-03 DIAGNOSIS — F43.10 PTSD (POST-TRAUMATIC STRESS DISORDER): Primary | ICD-10-CM

## 2022-03-03 DIAGNOSIS — F33.1 MODERATE EPISODE OF RECURRENT MAJOR DEPRESSIVE DISORDER: ICD-10-CM

## 2022-03-03 DIAGNOSIS — F41.1 GAD (GENERALIZED ANXIETY DISORDER): ICD-10-CM

## 2022-03-03 DIAGNOSIS — F90.0 ATTENTION DEFICIT HYPERACTIVITY DISORDER (ADHD), PREDOMINANTLY INATTENTIVE TYPE: ICD-10-CM

## 2022-03-03 LAB
ANA SER QL: NEGATIVE
BASOPHILS # BLD AUTO: 0 X10E3/UL (ref 0–0.2)
BASOPHILS NFR BLD AUTO: 0 %
EOSINOPHIL # BLD AUTO: 0.5 X10E3/UL (ref 0–0.4)
EOSINOPHIL NFR BLD AUTO: 6 %
ERYTHROCYTE [DISTWIDTH] IN BLOOD BY AUTOMATED COUNT: 13.1 % (ref 11.7–15.4)
ERYTHROCYTE [SEDIMENTATION RATE] IN BLOOD BY WESTERGREN METHOD: 5 MM/HR (ref 0–20)
HCT VFR BLD AUTO: 46.1 % (ref 34–46.6)
HGB BLD-MCNC: 15.1 G/DL (ref 11.1–15.9)
IMM GRANULOCYTES # BLD AUTO: 0 X10E3/UL (ref 0–0.1)
IMM GRANULOCYTES NFR BLD AUTO: 0 %
LYMPHOCYTES # BLD AUTO: 1.8 X10E3/UL (ref 0.7–3.1)
LYMPHOCYTES NFR BLD AUTO: 20 %
MCH RBC QN AUTO: 28.2 PG (ref 26.6–33)
MCHC RBC AUTO-ENTMCNC: 32.8 G/DL (ref 31.5–35.7)
MCV RBC AUTO: 86 FL (ref 79–97)
MONOCYTES # BLD AUTO: 0.6 X10E3/UL (ref 0.1–0.9)
MONOCYTES NFR BLD AUTO: 6 %
NEUTROPHILS # BLD AUTO: 6.1 X10E3/UL (ref 1.4–7)
NEUTROPHILS NFR BLD AUTO: 68 %
PATH INTERP BLD-IMP: ABNORMAL
PATH REV BLD -IMP: ABNORMAL
PATHOLOGIST NAME: ABNORMAL
PLATELET # BLD AUTO: 352 X10E3/UL (ref 150–450)
RBC # BLD AUTO: 5.35 X10E6/UL (ref 3.77–5.28)
WBC # BLD AUTO: 9.1 X10E3/UL (ref 3.4–10.8)

## 2022-03-03 PROCEDURE — 90792 PSYCH DIAG EVAL W/MED SRVCS: CPT | Performed by: NURSE PRACTITIONER

## 2022-03-03 RX ORDER — DEXMETHYLPHENIDATE HYDROCHLORIDE 10 MG/1
10 CAPSULE, EXTENDED RELEASE ORAL EVERY MORNING
Qty: 30 CAPSULE | Refills: 0 | Status: SHIPPED | OUTPATIENT
Start: 2022-03-03 | End: 2022-03-31

## 2022-03-03 RX ORDER — DULOXETIN HYDROCHLORIDE 60 MG/1
60 CAPSULE, DELAYED RELEASE ORAL DAILY
Qty: 30 CAPSULE | Refills: 2 | Status: SHIPPED | OUTPATIENT
Start: 2022-03-03 | End: 2022-04-28
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
## 2022-03-12 LAB — DRUGS UR: NORMAL

## 2022-03-13 ENCOUNTER — PATIENT MESSAGE (OUTPATIENT)
Dept: INTERNAL MEDICINE | Facility: CLINIC | Age: 22
End: 2022-03-13

## 2022-03-31 ENCOUNTER — OFFICE VISIT (OUTPATIENT)
Dept: BEHAVIORAL HEALTH | Facility: CLINIC | Age: 22
End: 2022-03-31

## 2022-03-31 VITALS
DIASTOLIC BLOOD PRESSURE: 74 MMHG | SYSTOLIC BLOOD PRESSURE: 118 MMHG | BODY MASS INDEX: 42.22 KG/M2 | WEIGHT: 269 LBS | HEIGHT: 67 IN

## 2022-03-31 DIAGNOSIS — F33.1 MODERATE EPISODE OF RECURRENT MAJOR DEPRESSIVE DISORDER: ICD-10-CM

## 2022-03-31 DIAGNOSIS — F90.0 ATTENTION DEFICIT HYPERACTIVITY DISORDER (ADHD), PREDOMINANTLY INATTENTIVE TYPE: Primary | ICD-10-CM

## 2022-03-31 DIAGNOSIS — F41.1 GAD (GENERALIZED ANXIETY DISORDER): ICD-10-CM

## 2022-03-31 DIAGNOSIS — F43.10 PTSD (POST-TRAUMATIC STRESS DISORDER): ICD-10-CM

## 2022-03-31 PROCEDURE — 99214 OFFICE O/P EST MOD 30 MIN: CPT | Performed by: NURSE PRACTITIONER

## 2022-03-31 RX ORDER — PROPRANOLOL HYDROCHLORIDE 10 MG/1
TABLET ORAL
Qty: 90 TABLET | Refills: 3 | Status: SHIPPED | OUTPATIENT
Start: 2022-03-31

## 2022-03-31 RX ORDER — DEXMETHYLPHENIDATE HCL 20 MG
20 CAPSULE,EXTENDED RELEASE BIPHASIC 50-50 ORAL EVERY MORNING
Qty: 30 CAPSULE | Refills: 0 | Status: SHIPPED | OUTPATIENT
Start: 2022-03-31 | End: 2022-04-28 | Stop reason: SDUPTHER

## 2022-03-31 RX ORDER — DEXMETHYLPHENIDATE HCL 20 MG
CAPSULE,EXTENDED RELEASE BIPHASIC 50-50 ORAL
COMMUNITY
Start: 2022-03-03 | End: 2022-03-31 | Stop reason: SDUPTHER

## 2022-03-31 RX ORDER — DEXMETHYLPHENIDATE HYDROCHLORIDE 10 MG/1
TABLET ORAL
Qty: 30 TABLET | Refills: 0 | OUTPATIENT
Start: 2022-03-31 | End: 2022-04-04

## 2022-03-31 NOTE — PROGRESS NOTES
Patient Name: Elayne Hernandez  MRN: [id number]   :  2000     Chief Complaint:      ICD-10-CM ICD-9-CM   1. Attention deficit hyperactivity disorder (ADHD), predominantly inattentive type  F90.0 314.00   2. PTSD (post-traumatic stress disorder)  F43.10 309.81   3. MARY (generalized anxiety disorder)  F41.1 300.02   4. Moderate episode of recurrent major depressive disorder (HCC)  F33.1 296.32       History of Present Illness: Elayne Hernandez is a 21 y.o. female is here today for medication management follow up.  Patient states she is noticed significant improvement.  Patient states she has been getting B's in class which is much higher than she is used to.  She is taking better notes and studying easier.  Does feel like it runs out around the middle of the day.  States her depression is under good control with the Cymbalta.  States she is still struggling with anxiety.    The following portions of the patient's history were reviewed and updated as appropriate: allergies, current medications, past family history, past medical history, past social history, past surgical history and problem list.    Review of Systems;;  Review of Systems   Constitutional: Negative for activity change, appetite change, fatigue, unexpected weight gain and unexpected weight loss.   Respiratory: Negative for shortness of breath and wheezing.    Gastrointestinal: Negative for constipation, diarrhea, nausea and vomiting.   Musculoskeletal: Negative for gait problem.   Skin: Negative for dry skin and rash.   Neurological: Negative for dizziness, speech difficulty, weakness, light-headedness, headache, memory problem and confusion.   Psychiatric/Behavioral: Negative for agitation, behavioral problems, decreased concentration, dysphoric mood, hallucinations, self-injury, sleep disturbance, suicidal ideas, negative for hyperactivity, depressed mood and stress. The patient is nervous/anxious.        Physical Exam;;  Physical Exam  Vitals  "and nursing note reviewed.   Constitutional:       General: She is not in acute distress.     Appearance: She is well-developed. She is not diaphoretic.   HENT:      Head: Normocephalic and atraumatic.   Eyes:      Conjunctiva/sclera: Conjunctivae normal.   Cardiovascular:      Rate and Rhythm: Normal rate.   Pulmonary:      Effort: Pulmonary effort is normal. No respiratory distress.   Musculoskeletal:         General: Normal range of motion.      Cervical back: Full passive range of motion without pain and normal range of motion.   Skin:     General: Skin is warm and dry.   Neurological:      Mental Status: She is alert and oriented to person, place, and time.   Psychiatric:         Mood and Affect: Mood is anxious. Mood is not depressed. Affect is not labile, blunt, angry or inappropriate.         Speech: Speech is not rapid and pressured or tangential.         Behavior: Behavior normal. Behavior is not agitated, slowed, aggressive, withdrawn, hyperactive or combative. Behavior is cooperative.         Thought Content: Thought content normal. Thought content is not paranoid or delusional. Thought content does not include homicidal or suicidal ideation. Thought content does not include homicidal or suicidal plan.         Judgment: Judgment normal.       Blood pressure 118/74, height 170.2 cm (67\"), weight 122 kg (269 lb).  Body mass index is 42.13 kg/m².    Current Medications;;    Current Outpatient Medications:   •  Focalin XR 20 MG 24 hr capsule, Take 1 capsule by mouth Every Morning, Disp: 30 capsule, Rfl: 0  •  baclofen (LIORESAL) 10 MG tablet, Take 1 tablet by mouth 3 (Three) Times a Day As Needed for Muscle Spasms., Disp: 90 tablet, Rfl: 1  •  dexmethylphenidate (FOCALIN) 10 MG tablet, Take 10 mg orally daily every afternoon, Disp: 30 tablet, Rfl: 0  •  DULoxetine (Cymbalta) 60 MG capsule, Take 1 capsule by mouth Daily., Disp: 30 capsule, Rfl: 2  •  loratadine (CLARITIN) 10 MG tablet, Take 1 tablet by mouth " Daily., Disp: 90 tablet, Rfl: 3  •  norgestimate-ethinyl estradiol (Tri-Sprintec) 0.18/0.215/0.25 MG-35 MCG per tablet, Take 1 tablet by mouth Daily., Disp: 28 tablet, Rfl: 12  •  ondansetron (ZOFRAN) 4 MG tablet, Take 1 tablet by mouth Every 8 (Eight) Hours As Needed for Nausea or Vomiting., Disp: 9 tablet, Rfl: 0  •  pantoprazole (PROTONIX) 20 MG EC tablet, Take 2 tablets by mouth Daily., Disp: 90 tablet, Rfl: 1  •  propranolol (INDERAL) 10 MG tablet, 1-2 po tid prn anxiety, Disp: 90 tablet, Rfl: 3    Lab Results:   Orders Only on 03/04/2022   Component Date Value Ref Range Status   • Report Summary 03/04/2022 FINAL   Final    Comment: ====================================================================  [facility] DRUG ANALYSIS,[facility]  ====================================================================  Test                             Result       Flag       Units  Drug Present and Declared for Prescription Verification    Baclofen                       PRESENT      EXPECTED    Ibuprofen                      PRESENT      EXPECTED  Drug Present not Declared for Prescription Verification    Alcohol, Ethyl                 0.020        UNEXPECTED g/dL     Sources of ethyl alcohol include alcoholic beverages or as a     fermentation product of glucose; glucose was not detected in this     specimen. Ethyl alcohol result should be interpreted in the     context of all available clinical and behavioral information.    Duloxetine                     PRESENT      UNEXPECTED    Acetaminophen                  PRESENT      UNEXPECTED    Salicylate                     PRESENT      UNEXPECTED  ===================================                           =================================  Test                      Result    Flag   Units      Ref Range    Creatinine              166              mg/dL      >=20  ====================================================================  Declared Medications:   The flagging and  interpretation on this report are based on the   following declared medications.  Unexpected results may arise from   inaccuracies in the declared medications.   **Note: The testing scope of this panel includes these medications:   Baclofen   **Note: The testing scope of this panel does not include small to   moderate amounts of these reported medications:   Ibuprofen   **Note: The testing scope of this panel does not include following   reported medications:   Ethinyl estradiol   Indomethacin   Loratadine   Norgestimate   Ondansetron   Pantoprazole  ====================================================================  For clinical consultation, please call [phone].  ==============================================                           ======================     Office Visit on 03/01/2022   Component Date Value Ref Range Status   • WBC 03/01/2022 Comment   Final    Comment: Eosinophilia. This can be seen in allergic reactions (acute  or chronic), drug therapy, or parasite infestation. Other  associations include solid tumor, hematologic neoplasms,  hyper-eosinophilic syndromes or idiopathic causes.     • RBC 03/01/2022 Comment   Final    Comment: Erythrocytosis. Further evaluation indicated to distinguish  between primary and secondary causes.     • Platelets 03/01/2022 Appear normal.   Final   • Comment 03/01/2022 Comment   Final   • Pathologist Review 03/01/2022 Comment   Final    Reviewed by: Pawel Pabon MD, Pathologist   • WBC 03/01/2022 9.1  3.4 - 10.8 x10E3/uL Final   • RBC 03/01/2022 5.35 (A) 3.77 - 5.28 x10E6/uL Final   • Hemoglobin 03/01/2022 15.1  11.1 - 15.9 g/dL Final   • Hematocrit 03/01/2022 46.1  34.0 - 46.6 % Final   • MCV 03/01/2022 86  79 - 97 fL Final   • MCH 03/01/2022 28.2  26.6 - 33.0 pg Final   • MCHC 03/01/2022 32.8  31.5 - 35.7 g/dL Final   • RDW 03/01/2022 13.1  11.7 - 15.4 % Final   • Platelets 03/01/2022 352  150 - 450 x10E3/uL Final   • Neutrophil Rel % 03/01/2022 68  Not  Estab. % Final   • Lymphocyte Rel % 03/01/2022 20  Not Estab. % Final   • Monocyte Rel % 03/01/2022 6  Not Estab. % Final   • Eosinophil Rel % 03/01/2022 6  Not Estab. % Final   • Basophil Rel % 03/01/2022 0  Not Estab. % Final   • Neutrophils Absolute 03/01/2022 6.1  1.4 - 7.0 x10E3/uL Final   • Lymphocytes Absolute 03/01/2022 1.8  0.7 - 3.1 x10E3/uL Final   • Monocytes Absolute 03/01/2022 0.6  0.1 - 0.9 x10E3/uL Final   • Eosinophils Absolute 03/01/2022 0.5 (A) 0.0 - 0.4 x10E3/uL Final   • Basophils Absolute 03/01/2022 0.0  0.0 - 0.2 x10E3/uL Final   • Immature Granulocyte Rel % 03/01/2022 0  Not Estab. % Final   • Immature Grans Absolute 03/01/2022 0.0  0.0 - 0.1 x10E3/uL Final   • SHANA Direct 03/01/2022 Negative  Negative Final   • Sed Rate 03/01/2022 5  0 - 20 mm/hr Final   Admission on 02/02/2022, Discharged on 02/02/2022   Component Date Value Ref Range Status   • Color 02/02/2022 Yellow  Yellow, Straw, Dark Yellow, Lesely Final   • Clarity, UA 02/02/2022 Clear  Clear Final   • Glucose, UA 02/02/2022 Negative  Negative, 1000 mg/dL (3+) mg/dL Final   • Bilirubin 02/02/2022 Negative  Negative Final   • Ketones, UA 02/02/2022 Negative  Negative Final   • Specific Gravity  02/02/2022 1.025  1.005 - 1.030 Final   • Blood, UA 02/02/2022 Negative  Negative Final   • pH, Urine 02/02/2022 5.0  5.0 - 8.0 Final   • Protein, POC 02/02/2022 Negative  Negative mg/dL Final   • Urobilinogen, UA 02/02/2022 Normal  Normal Final   • Nitrite, UA 02/02/2022 Negative  Negative Final   • Leukocytes 02/02/2022 Negative  Negative Final   Office Visit on 01/27/2022   Component Date Value Ref Range Status   • HCG, Urine, QL 01/27/2022 Negative  Negative Final   • Lot Number 01/27/2022 [id number]   Final   • Internal Positive Control 01/27/2022 Passed  Positive, Passed Final   • Internal Negative Control 01/27/2022 Passed  Negative, Passed Final   • Expiration Date 01/27/2022 04/30/2023   Final   • WBC 01/27/2022 9.64  3.40 - 10.80  10*3/mm3 Final   • RBC 01/27/2022 5.94 (A) 3.77 - 5.28 10*6/mm3 Final   • Hemoglobin 01/27/2022 16.7 (A) 12.0 - 15.9 g/dL Final   • Hematocrit 01/27/2022 51.0 (A) 34.0 - 46.6 % Final   • MCV 01/27/2022 85.9  79.0 - 97.0 fL Final   • MCH 01/27/2022 28.1  26.6 - 33.0 pg Final   • MCHC 01/27/2022 32.7  31.5 - 35.7 g/dL Final   • RDW 01/27/2022 12.9  12.3 - 15.4 % Final   • Platelets 01/27/2022 328  140 - 450 10*3/mm3 Final   • Glucose 01/27/2022 107 (A) 65 - 99 mg/dL Final   • BUN 01/27/2022 10  6 - 20 mg/dL Final   • Creatinine 01/27/2022 0.58  0.57 - 1.00 mg/dL Final   • eGFR Non  Am 01/27/2022 133  >60 mL/min/1.73 Final    Comment: GFR Normal >60  Chronic Kidney Disease <60  Kidney Failure <15     • eGFR  Am 01/27/2022 >150  >60 mL/min/1.73 Final   • BUN/Creatinine Ratio 01/27/2022 17.2  7.0 - 25.0 Final   • Sodium 01/27/2022 141  136 - 145 mmol/L Final   • Potassium 01/27/2022 4.7  3.5 - 5.2 mmol/L Final    Comment: Slight hemolysis detected by analyzer. Results may be  affected.     • Chloride 01/27/2022 104  98 - 107 mmol/L Final   • Total CO2 01/27/2022 21.7 (A) 22.0 - 29.0 mmol/L Final   • Calcium 01/27/2022 9.6  8.6 - 10.5 mg/dL Final   • Total Protein 01/27/2022 7.0  6.0 - 8.5 g/dL Final   • Albumin 01/27/2022 4.50  3.50 - 5.20 g/dL Final   • Globulin 01/27/2022 2.5  gm/dL Final   • A/G Ratio 01/27/2022 1.8  g/dL Final   • Total Bilirubin 01/27/2022 0.3  0.0 - 1.2 mg/dL Final   • Alkaline Phosphatase 01/27/2022 77  39 - 117 U/L Final   • AST (SGOT) 01/27/2022 11  1 - 32 U/L Final   • ALT (SGPT) 01/27/2022 22  1 - 33 U/L Final   • TIBC 01/27/2022 413  mcg/dL Final   • UIBC 01/27/2022 329  112 - 346 mcg/dL Final   • Iron 01/27/2022 84  37 - 145 mcg/dL Final   • Iron Saturation 01/27/2022 20  20 - 50 % Final   • Ferritin 01/27/2022 101.00  13.00 - 150.00 ng/mL Final    Results may be falsely decreased if patient taking Biotin.   • Hep C Virus Ab 01/27/2022 <0.1  0.0 - 0.9 s/co ratio Final     Comment:                                   Negative:     < 0.8                               Indeterminate: 0.8 - 0.9                                    Positive:     > 0.9   The CDC recommends that a positive HCV antibody result   be followed up with a HCV Nucleic Acid Amplification   test (050252).         Mental Status Exam:   Hygiene:   good  Cooperation:  Cooperative  Eye Contact:  Good  Psychomotor Behavior:  Appropriate  Mood:anxious  Affect:  Appropriate  Hopelessness: Denies  Speech:  Normal  Thought Process:  Goal directed  Thought Content:  Normal  Suicidal:  None  Homicidal:  None  Hallucinations:  None  Delusion:  None  Memory:  Intact  Orientation:  Person, Place, Time and Situation  Reliability:  good  Insight:  Good  Judgement:  Good  Impulse Control:  Good  Physical/Medical Issues:  No     PHQ-9 Depression Screening  Little interest or pleasure in doing things? 1-->several days   Feeling down, depressed, or hopeless? 1-->several days   Trouble falling or staying asleep, or sleeping too much? 3-->nearly every day   Feeling tired or having little energy? 2-->more than half the days   Poor appetite or overeating? 2-->more than half the days   Feeling bad about yourself - or that you are a failure or have let yourself or your family down? 3-->nearly every day   Trouble concentrating on things, such as reading the newspaper or watching television? 1-->several days   Moving or speaking so slowly that other people could have noticed? Or the opposite - being so fidgety or restless that you have been moving around a lot more than usual? 1-->several days   Thoughts that you would be better off dead, or of hurting yourself in some way? 0-->not at all   PHQ-9 Total Score 14   If you checked off any problems, how difficult have these problems made it for you to do your work, take care of things at home, or get along with other people? somewhat difficult        Assessment/Plan:  Diagnoses and all orders for this  visit:    1. Attention deficit hyperactivity disorder (ADHD), predominantly inattentive type (Primary)  -     Focalin XR 20 MG 24 hr capsule; Take 1 capsule by mouth Every Morning  Dispense: 30 capsule; Refill: 0  -     dexmethylphenidate (FOCALIN) 10 MG tablet; Take 10 mg orally daily every afternoon  Dispense: 30 tablet; Refill: 0    2. PTSD (post-traumatic stress disorder)    3. MARY (generalized anxiety disorder)  -     propranolol (INDERAL) 10 MG tablet; 1-2 po tid prn anxiety  Dispense: 90 tablet; Refill: 3    4. Moderate episode of recurrent major depressive disorder (HCC)    Patient focus is improving.  We will add Focalin 10 mg in the afternoon and we will add propranolol as needed for anxiety as she is still struggling with this.      A psychological evaluation was conducted in order to assess past and current level of functioning. Areas assessed included, but were not limited to: perception of social support, perception of ability to face and deal with challenges in life (positive functioning), anxiety symptoms, depressive symptoms, perspective on beliefs/belief system, coping skills for stress, intelligence level,  Therapeutic rapport was established. Interventions conducted today were geared towards incorporating medication management along with support for continued therapy. Education was also provided as to the med management with this provider and what to expect in subsequent sessions.    We discussed risks, benefits,goals and side effects of the above medication and the patient was agreeable with the plan.Patient was educated on the importance of compliance with treatment and follow-up appointments. Patient is aware to contact the [facility] with any worsening of symptoms. To call for questions or concerns and return early if necessary. Patent is agreeable to go to the Emergency Department or call 911 should they begin SI/HI.     Treatment Plan:   Discussed risks, benefits, and alternatives of  medication. Encouraged healthy habits (eating, exercise and sleep). Call if any questions or problems arise. Medication reconciled. Controlled substance monitoring report reviewed. Provided psychoeducation.. Discussed coping strategies and current stressors. Set appropriate boundaries and limits for patient's well-being. Use distraction techniques to improve symptoms. Access support networks.      Return in about 4 weeks (around 4/28/2022) for Follow Up 15 min.    Miranda Jack, APRN

## 2022-04-15 ENCOUNTER — OFFICE VISIT (OUTPATIENT)
Dept: INTERNAL MEDICINE | Facility: CLINIC | Age: 22
End: 2022-04-15

## 2022-04-15 VITALS
DIASTOLIC BLOOD PRESSURE: 88 MMHG | HEIGHT: 67 IN | SYSTOLIC BLOOD PRESSURE: 141 MMHG | BODY MASS INDEX: 45.67 KG/M2 | OXYGEN SATURATION: 98 % | WEIGHT: 291 LBS | RESPIRATION RATE: 16 BRPM | TEMPERATURE: 98 F | HEART RATE: 118 BPM

## 2022-04-15 DIAGNOSIS — F33.1 MODERATE EPISODE OF RECURRENT MAJOR DEPRESSIVE DISORDER: ICD-10-CM

## 2022-04-15 DIAGNOSIS — E66.01 CLASS 3 SEVERE OBESITY DUE TO EXCESS CALORIES WITHOUT SERIOUS COMORBIDITY WITH BODY MASS INDEX (BMI) OF 45.0 TO 49.9 IN ADULT: ICD-10-CM

## 2022-04-15 DIAGNOSIS — Z00.00 ANNUAL PHYSICAL EXAM: Primary | ICD-10-CM

## 2022-04-15 DIAGNOSIS — F90.0 ATTENTION DEFICIT HYPERACTIVITY DISORDER (ADHD), PREDOMINANTLY INATTENTIVE TYPE: ICD-10-CM

## 2022-04-15 DIAGNOSIS — M25.572 PAIN OF JOINT OF LEFT ANKLE AND FOOT: ICD-10-CM

## 2022-04-15 DIAGNOSIS — S93.402D SPRAIN OF LEFT ANKLE, UNSPECIFIED LIGAMENT, SUBSEQUENT ENCOUNTER: ICD-10-CM

## 2022-04-15 DIAGNOSIS — M79.7 FIBROMYALGIA: ICD-10-CM

## 2022-04-15 PROCEDURE — 3008F BODY MASS INDEX DOCD: CPT | Performed by: NURSE PRACTITIONER

## 2022-04-15 PROCEDURE — 2014F MENTAL STATUS ASSESS: CPT | Performed by: NURSE PRACTITIONER

## 2022-04-15 PROCEDURE — 99395 PREV VISIT EST AGE 18-39: CPT | Performed by: NURSE PRACTITIONER

## 2022-04-15 NOTE — PROGRESS NOTES
Subjective   Elayne Hernandez is a 21 y.o. female and is here for a comprehensive physical exam. The patient reports no problems.    HPI: here today for annual exam with acute complaints of dizziness.   Seeing behavioral health for management of ADHD and depression. Currently taking duloxetine and focalin. States this is the happiest she has ever been and doing well.  Chronic pain from fibromyalgia feels well controlled with exception of left ankle. Had a sprain about 2 months ago and still having pain at times, although has improved. Pain is present with weight bearing. There is no swelling or numbness/tingling.   She declines COVID vaccine today, not due for a pap smear until September.     Health Habits:  Eye exam within last 2 years? No, will schedule.   Dental exam every 6 months? No, will schedule.   Exercise habits: does not get any structured exercise.   Healthy diet? Typical American diet, sometimes binge eats.     The ASCVD Risk score (Gema PACK Jr., et al., 2013) failed to calculate for the following reasons:    The 2013 ASCVD risk score is only valid for ages 40 to 79    Do you take any herbs or supplements that were not prescribed by a doctor? no  Are you taking calcium supplements? No  Are you taking aspirin daily? No     History:  LMP: Patient's last menstrual period was 03/21/2022 (approximate).  Menopause: No  Last pap date: Never  Abnormal pap? N/A  Family history of breast or ovarian cancer: no       OB History   No obstetric history on file.      reports being sexually active and has had partner(s) who are male. She reports using the following methods of birth control/protection: Condom and OCP.    The following portions of the patient's history were reviewed and updated as appropriate: She  has a past medical history of ADHD (attention deficit hyperactivity disorder) (03/2022), Allergic (2013), Anemia, Anxiety, Depression, Fibromyalgia, primary, Gall stones, GERD (gastroesophageal reflux  disease), and Migraine.  She does not have any pertinent problems on file.  She  has a past surgical history that includes Cholecystectomy (06/2018); [place] tooth extraction; Tonsillectomy; and Adenoidectomy.  Her family history includes Anxiety disorder in her mother and sister; Arthritis in her mother; COPD in her father; Depression in her mother and sister; Diabetes in her father and paternal grandfather; Heart attack in her maternal grandfather; Heart disease in her mother; Hyperlipidemia in her father and mother; Hypertension in her father and mother; Lung cancer in her maternal grandfather; Mental illness in her mother and sister; Obesity in her mother.  She  reports that she has never smoked. She has never used smokeless tobacco. She reports that she does not drink alcohol and does not use drugs.  Current Outpatient Medications   Medication Sig Dispense Refill   • baclofen (LIORESAL) 10 MG tablet Take 1 tablet by mouth 3 (Three) Times a Day As Needed for Muscle Spasms. 90 tablet 1   • DULoxetine (Cymbalta) 60 MG capsule Take 1 capsule by mouth Daily. 30 capsule 2   • Focalin XR 20 MG 24 hr capsule Take 1 capsule by mouth Every Morning 30 capsule 0   • loratadine (CLARITIN) 10 MG tablet Take 1 tablet by mouth Daily. 90 tablet 3   • norgestimate-ethinyl estradiol (Tri-Sprintec) 0.18/0.215/0.25 MG-35 MCG per tablet Take 1 tablet by mouth Daily. 28 tablet 12   • pantoprazole (PROTONIX) 20 MG EC tablet Take 2 tablets by mouth Daily. 90 tablet 1   • propranolol (INDERAL) 10 MG tablet 1-2 po tid prn anxiety 90 tablet 3     No current facility-administered medications for this visit.       Review of Systems  Review of Systems   Constitutional: Positive for fatigue. Negative for appetite change and fever.   HENT: Negative for congestion, sore throat and trouble swallowing.    Eyes: Negative for blurred vision and visual disturbance.   Respiratory: Negative for cough, shortness of breath and wheezing.   "  Cardiovascular: Negative for chest pain, palpitations and leg swelling.   Gastrointestinal: Negative for abdominal pain, blood in stool, constipation, diarrhea and nausea.   Endocrine: Negative for polydipsia, polyphagia and polyuria.   Genitourinary: Negative for dysuria.   Musculoskeletal: Positive for arthralgias and myalgias. Negative for back pain.   Skin: Negative for rash.   Neurological: Negative for dizziness, weakness, light-headedness and headache.   Psychiatric/Behavioral: Negative for sleep disturbance, suicidal ideas, depressed mood and stress. The patient is not nervous/anxious.          Objective   /88   Pulse 118   Temp 98 °F (36.7 °C)   Resp 16   Ht 170.2 cm (67.01\")   Wt 132 kg (291 lb)   LMP 03/21/2022 (Approximate)   SpO2 98%   BMI 45.57 kg/m²     Physical Exam  Vitals and nursing note reviewed. Exam conducted with a chaperone present (Sivan Ewing CMA).   Constitutional:       General: She is not in acute distress.     Appearance: Normal appearance. She is obese. She is not ill-appearing.   HENT:      Right Ear: Tympanic membrane and ear canal normal.      Left Ear: Tympanic membrane and ear canal normal.      Nose: Nose normal.      Mouth/Throat:      Mouth: Mucous membranes are moist.      Pharynx: Oropharynx is clear. No posterior oropharyngeal erythema.   Eyes:      Extraocular Movements: Extraocular movements intact.      Pupils: Pupils are equal, round, and reactive to light.   Neck:      Thyroid: No thyroid mass or thyromegaly.      Vascular: No carotid bruit.   Cardiovascular:      Rate and Rhythm: Normal rate and regular rhythm.      Pulses: Normal pulses.      Heart sounds: Normal heart sounds. No murmur heard.  Pulmonary:      Effort: Pulmonary effort is normal.      Breath sounds: Normal breath sounds. No wheezing.   Chest:   Breasts:      Preston Score is 5.      Right: Normal. No inverted nipple, mass, nipple discharge, axillary adenopathy or supraclavicular " adenopathy.      Left: Normal. No inverted nipple, mass, nipple discharge, axillary adenopathy or supraclavicular adenopathy.       Abdominal:      General: Bowel sounds are normal. There is no distension.      Palpations: Abdomen is soft. There is no mass.      Tenderness: There is no abdominal tenderness.   Musculoskeletal:         General: Tenderness (left lateral and medial ankle) present. No deformity. Normal range of motion.      Cervical back: Normal range of motion and neck supple. No muscular tenderness.   Lymphadenopathy:      Head:      Right side of head: No submandibular, tonsillar, preauricular or posterior auricular adenopathy.      Left side of head: No submandibular, tonsillar, preauricular or posterior auricular adenopathy.      Cervical: No cervical adenopathy.      Upper Body:      Right upper body: No supraclavicular, axillary or pectoral adenopathy.      Left upper body: No supraclavicular, axillary or pectoral adenopathy.   Skin:     General: Skin is warm and dry.      Capillary Refill: Capillary refill takes less than 2 seconds.      Findings: Bruising (left ankle) present. No rash.   Neurological:      Mental Status: She is alert and oriented to person, place, and time.      Gait: Gait normal.      Deep Tendon Reflexes: Reflexes normal.   Psychiatric:         Mood and Affect: Mood is anxious.         Behavior: Behavior normal.            Assessment/Plan   1. Healthy female exam.      Diagnosis Plan   1. Annual physical exam  Health maintenance discussed during visit and information provided in AVS. Previous labs reviewed. Recommend healthy diet, exercise as tolerated, stress management, dental and vision exams for screening purposes, and yearly annual physical. Monthly self breast exams discussed and will start screening pap smear at 22.    2. Moderate episode of recurrent major depressive disorder (HCC)  Stable, managed by psych. Continue current medication regimen and keep follow-up.    3.  Class 3 severe obesity due to excess calories without serious comorbidity with body mass index (BMI) of 45.0 to 49.9 in adult (HCC)  Increase lean meats, vegetables, fruit, healthy fats, and water intake. Decrease processed foods, sugar, carbohydrates, soda, and fast food. Educated on portion control and recommendations of moderate intensity exercise 4-5 times/week. This problem will be re-evaluated at next regular visit.           4. Sprain of left ankle, unspecified ligament, subsequent encounter  Ambulatory Referral to Physical Therapy Evaluate and treat   5. Pain of joint of left ankle and foot  Ambulatory Referral to Physical Therapy Evaluate and treat    X-rays reviewed, improving but slow to heal. Recommend physical therapy for strengthening- if unimproved after this refer to ortho. Recommend NSAIDs and bracing as needed.    6. Attention deficit hyperactivity disorder (ADHD), predominantly inattentive type  Controlled and managed by psych. Keep follow-up.    7. Fibromyalgia  Improved pain with increase in duloxetine, continue at current dose. Recommend light weight bearing exercise, stretching, weight loss, and tylenol as needed for pain.          2. Patient Counseling:  --Nutrition: Stressed importance of moderation in sodium/caffeine intake, saturated fat and cholesterol, caloric balance, sufficient intake of fresh fruits, vegetables, fiber, calcium, iron, and 1 g folate supplementation if of childbearing age.   --Discussed the issue of calcium supplement, and the daily use of baby aspirin if applicable.             --Mammogram recommended every 2 years from age 40-49 and yearly beginning at age 50.  --Exercise: Stressed the importance of regular exercise.   --Substance Abuse: Discussed cessation/primary prevention of tobacco (if applicable), alcohol, or other drug use (if applicable); driving or other dangerous activities under the influence; availability of treatment for abuse.    --Sexuality: Discussed  sexually transmitted diseases, partner selection, use of condoms, avoidance of unintended pregnancy  and contraceptive alternatives.   --Injury prevention: Discussed safety belts, safety helmets, smoke detector, smoking near bedding or upholstery.   --Dental health: Discussed importance of regular tooth brushing, flossing, and dental visits every 6 months.  --Immunizations reviewed.  --After hours service discussed with patient    3. Discussed the patient's BMI with her.  The BMI is above average; BMI management plan is completed  4. Return in about 1 year (around 4/15/2023) for Annual.     Noy Manzano, VALENTINE  04/15/2022  10:13 EDT

## 2022-04-28 ENCOUNTER — OFFICE VISIT (OUTPATIENT)
Dept: BEHAVIORAL HEALTH | Facility: CLINIC | Age: 22
End: 2022-04-28

## 2022-04-28 VITALS
WEIGHT: 291 LBS | BODY MASS INDEX: 45.67 KG/M2 | SYSTOLIC BLOOD PRESSURE: 122 MMHG | DIASTOLIC BLOOD PRESSURE: 78 MMHG | HEIGHT: 67 IN

## 2022-04-28 DIAGNOSIS — F33.1 MODERATE EPISODE OF RECURRENT MAJOR DEPRESSIVE DISORDER: ICD-10-CM

## 2022-04-28 DIAGNOSIS — F90.0 ATTENTION DEFICIT HYPERACTIVITY DISORDER (ADHD), PREDOMINANTLY INATTENTIVE TYPE: Primary | ICD-10-CM

## 2022-04-28 DIAGNOSIS — F41.1 GAD (GENERALIZED ANXIETY DISORDER): ICD-10-CM

## 2022-04-28 DIAGNOSIS — F43.10 PTSD (POST-TRAUMATIC STRESS DISORDER): ICD-10-CM

## 2022-04-28 PROCEDURE — 99213 OFFICE O/P EST LOW 20 MIN: CPT | Performed by: NURSE PRACTITIONER

## 2022-04-28 RX ORDER — DULOXETIN HYDROCHLORIDE 30 MG/1
30 CAPSULE, DELAYED RELEASE ORAL DAILY
Qty: 30 CAPSULE | Refills: 2 | Status: SHIPPED | OUTPATIENT
Start: 2022-04-28 | End: 2022-08-12 | Stop reason: SDUPTHER

## 2022-04-28 RX ORDER — DEXMETHYLPHENIDATE HCL 20 MG
20 CAPSULE,EXTENDED RELEASE BIPHASIC 50-50 ORAL EVERY MORNING
Qty: 30 CAPSULE | Refills: 0 | Status: SHIPPED | OUTPATIENT
Start: 2022-04-28 | End: 2022-06-14 | Stop reason: SDUPTHER

## 2022-04-28 RX ORDER — DEXMETHYLPHENIDATE HYDROCHLORIDE 10 MG/1
TABLET ORAL
Qty: 30 TABLET | Refills: 0 | OUTPATIENT
Start: 2022-04-28 | End: 2022-05-11

## 2022-04-28 NOTE — PROGRESS NOTES
Patient Name: Elayne Hernandez  MRN: [id number]   :  2000     Chief Complaint:      ICD-10-CM ICD-9-CM   1. Attention deficit hyperactivity disorder (ADHD), predominantly inattentive type  F90.0 314.00   2. PTSD (post-traumatic stress disorder)  F43.10 309.81   3. MARY (generalized anxiety disorder)  F41.1 300.02   4. Moderate episode of recurrent major depressive disorder (HCC)  F33.1 296.32       History of Present Illness: Elayne Hernandez is a 21 y.o. female is here today for medication management follow up. Uncle is dying and pt is having a hard time with this. Never spent any time with him until recently and now is feeling regret about this.     The following portions of the patient's history were reviewed and updated as appropriate: allergies, current medications, past family history, past medical history, past social history, past surgical history and problem list.    Review of Systems;;  Review of Systems   Constitutional: Negative for activity change, appetite change, fatigue, unexpected weight gain and unexpected weight loss.   Respiratory: Negative for shortness of breath and wheezing.    Gastrointestinal: Negative for constipation, diarrhea, nausea and vomiting.   Musculoskeletal: Negative for gait problem.   Skin: Negative for dry skin and rash.   Neurological: Negative for dizziness, speech difficulty, weakness, light-headedness, headache, memory problem and confusion.   Psychiatric/Behavioral: Positive for stress. Negative for agitation, behavioral problems, decreased concentration, dysphoric mood, hallucinations, self-injury, sleep disturbance, suicidal ideas, negative for hyperactivity and depressed mood. The patient is not nervous/anxious.        Physical Exam;;  Physical Exam  Vitals and nursing note reviewed.   Constitutional:       General: She is not in acute distress.     Appearance: She is well-developed. She is not diaphoretic.   HENT:      Head: Normocephalic and atraumatic.   Eyes:  "     Conjunctiva/sclera: Conjunctivae normal.   Cardiovascular:      Rate and Rhythm: Normal rate.   Pulmonary:      Effort: Pulmonary effort is normal. No respiratory distress.   Musculoskeletal:         General: Normal range of motion.      Cervical back: Full passive range of motion without pain and normal range of motion.   Skin:     General: Skin is warm and dry.   Neurological:      Mental Status: She is alert and oriented to person, place, and time.   Psychiatric:         Mood and Affect: Mood is not anxious or depressed. Affect is not labile, blunt, angry or inappropriate.         Speech: Speech is not rapid and pressured or tangential.         Behavior: Behavior normal. Behavior is not agitated, slowed, aggressive, withdrawn, hyperactive or combative. Behavior is cooperative.         Thought Content: Thought content normal. Thought content is not paranoid or delusional. Thought content does not include homicidal or suicidal ideation. Thought content does not include homicidal or suicidal plan.         Judgment: Judgment normal.       Blood pressure 122/78, height 170.2 cm (67\"), weight 132 kg (291 lb).  Body mass index is 45.58 kg/m².    Current Medications;;    Current Outpatient Medications:   •  baclofen (LIORESAL) 10 MG tablet, Take 1 tablet by mouth 3 (Three) Times a Day As Needed for Muscle Spasms., Disp: 90 tablet, Rfl: 1  •  Focalin XR 20 MG 24 hr capsule, Take 1 capsule by mouth Every Morning, Disp: 30 capsule, Rfl: 0  •  loratadine (CLARITIN) 10 MG tablet, Take 1 tablet by mouth Daily., Disp: 90 tablet, Rfl: 3  •  norgestimate-ethinyl estradiol (Tri-Sprintec) 0.18/0.215/0.25 MG-35 MCG per tablet, Take 1 tablet by mouth Daily., Disp: 28 tablet, Rfl: 12  •  pantoprazole (PROTONIX) 20 MG EC tablet, Take 2 tablets by mouth Daily., Disp: 90 tablet, Rfl: 1  •  propranolol (INDERAL) 10 MG tablet, 1-2 po tid prn anxiety, Disp: 90 tablet, Rfl: 3  •  dexmethylphenidate (FOCALIN) 10 MG tablet, Take 10 mg " orally daily every afternoon, Disp: 30 tablet, Rfl: 0  •  DULoxetine (Cymbalta) 30 MG capsule, Take 1 capsule by mouth Daily., Disp: 30 capsule, Rfl: 2    Lab Results:   Admission on 04/04/2022, Discharged on 04/04/2022   Component Date Value Ref Range Status   • Rapid Strep A Screen 04/04/2022 Negative  Negative, VALID, INVALID, Not Performed Final   • Internal Control 04/04/2022 Passed  Passed Final   • Lot Number 04/04/2022 [id number]   Final   • Expiration Date 04/04/2022 9/30/23   Final   • Rapid Influenza A Ag 04/04/2022 Negative  Negative Final   • Rapid Influenza B Ag 04/04/2022 Negative  Negative Final   • Internal Control 04/04/2022 Passed  Passed Final   • Lot Number 04/04/2022 [id number]   Final   • Expiration Date 04/04/2022 10/23/23   Final   Orders Only on 03/04/2022   Component Date Value Ref Range Status   • Report Summary 03/04/2022 FINAL   Final    Comment: ====================================================================  [facility]NALYS[facility]  ====================================================================  Test                             Result       Flag       Units  Drug Present and Declared for Prescription Verification    Baclofen                       PRESENT      EXPECTED    Ibuprofen                      PRESENT      EXPECTED  Drug Present not Declared for Prescription Verification    Alcohol, Ethyl                 0.020        UNEXPECTED g/dL     Sources of ethyl alcohol include alcoholic beverages or as a     fermentation product of glucose; glucose was not detected in this     specimen. Ethyl alcohol result should be interpreted in the     context of all available clinical and behavioral information.    Duloxetine                     PRESENT      UNEXPECTED    Acetaminophen                  PRESENT      UNEXPECTED    Salicylate                     PRESENT      UNEXPECTED  ===================================                            =================================  Test                      Result    Flag   Units      Ref Range    Creatinine              166              mg/dL      >=20  ====================================================================  Declared Medications:   The flagging and interpretation on this report are based on the   following declared medications.  Unexpected results may arise from   inaccuracies in the declared medications.   **Note: The testing scope of this panel includes these medications:   Baclofen   **Note: The testing scope of this panel does not include small to   moderate amounts of these reported medications:   Ibuprofen   **Note: The testing scope of this panel does not include following   reported medications:   Ethinyl estradiol   Indomethacin   Loratadine   Norgestimate   Ondansetron   Pantoprazole  ====================================================================  For clinical consultation, please call [phone].  ==============================================                           ======================     Office Visit on 03/01/2022   Component Date Value Ref Range Status   • WBC 03/01/2022 Comment   Final    Comment: Eosinophilia. This can be seen in allergic reactions (acute  or chronic), drug therapy, or parasite infestation. Other  associations include solid tumor, hematologic neoplasms,  hyper-eosinophilic syndromes or idiopathic causes.     • RBC 03/01/2022 Comment   Final    Comment: Erythrocytosis. Further evaluation indicated to distinguish  between primary and secondary causes.     • Platelets 03/01/2022 Appear normal.   Final   • Comment 03/01/2022 Comment   Final   • Pathologist Review 03/01/2022 Comment   Final    Reviewed by: Pawel Pabon MD, Pathologist   • WBC 03/01/2022 9.1  3.4 - 10.8 x10E3/uL Final   • RBC 03/01/2022 5.35 (A) 3.77 - 5.28 x10E6/uL Final   • Hemoglobin 03/01/2022 15.1  11.1 - 15.9 g/dL Final   • Hematocrit 03/01/2022 46.1  34.0 - 46.6 % Final   • MCV  03/01/2022 86  79 - 97 fL Final   • MCH 03/01/2022 28.2  26.6 - 33.0 pg Final   • MCHC 03/01/2022 32.8  31.5 - 35.7 g/dL Final   • RDW 03/01/2022 13.1  11.7 - 15.4 % Final   • Platelets 03/01/2022 352  150 - 450 x10E3/uL Final   • Neutrophil Rel % 03/01/2022 68  Not Estab. % Final   • Lymphocyte Rel % 03/01/2022 20  Not Estab. % Final   • Monocyte Rel % 03/01/2022 6  Not Estab. % Final   • Eosinophil Rel % 03/01/2022 6  Not Estab. % Final   • Basophil Rel % 03/01/2022 0  Not Estab. % Final   • Neutrophils Absolute 03/01/2022 6.1  1.4 - 7.0 x10E3/uL Final   • Lymphocytes Absolute 03/01/2022 1.8  0.7 - 3.1 x10E3/uL Final   • Monocytes Absolute 03/01/2022 0.6  0.1 - 0.9 x10E3/uL Final   • Eosinophils Absolute 03/01/2022 0.5 (A) 0.0 - 0.4 x10E3/uL Final   • Basophils Absolute 03/01/2022 0.0  0.0 - 0.2 x10E3/uL Final   • Immature Granulocyte Rel % 03/01/2022 0  Not Estab. % Final   • Immature Grans Absolute 03/01/2022 0.0  0.0 - 0.1 x10E3/uL Final   • SHANA Direct 03/01/2022 Negative  Negative Final   • Sed Rate 03/01/2022 5  0 - 20 mm/hr Final   Admission on 02/02/2022, Discharged on 02/02/2022   Component Date Value Ref Range Status   • Color 02/02/2022 Yellow  Yellow, Straw, Dark Yellow, Lesley Final   • Clarity, UA 02/02/2022 Clear  Clear Final   • Glucose, UA 02/02/2022 Negative  Negative, 1000 mg/dL (3+) mg/dL Final   • Bilirubin 02/02/2022 Negative  Negative Final   • Ketones, UA 02/02/2022 Negative  Negative Final   • Specific Gravity  02/02/2022 1.025  1.005 - 1.030 Final   • Blood, UA 02/02/2022 Negative  Negative Final   • pH, Urine 02/02/2022 5.0  5.0 - 8.0 Final   • Protein, POC 02/02/2022 Negative  Negative mg/dL Final   • Urobilinogen, UA 02/02/2022 Normal  Normal Final   • Nitrite, UA 02/02/2022 Negative  Negative Final   • Leukocytes 02/02/2022 Negative  Negative Final       Mental Status Exam:   Hygiene:   good  Cooperation:  Cooperative  Eye Contact:  Good  Psychomotor Behavior:   Appropriate  Mood:sad  Affect:  Appropriate  Hopelessness: Denies  Speech:  Normal  Thought Process:  Goal directed  Thought Content:  Normal  Suicidal:  None  Homicidal:  None  Hallucinations:  None  Delusion:  None  Memory:  Intact  Orientation:  Person, Place, Time and Situation  Reliability:  good  Insight:  Good  Judgement:  Good  Impulse Control:  Good  Physical/Medical Issues:  No     PHQ-9 Depression Screening  Little interest or pleasure in doing things? 1-->several days   Feeling down, depressed, or hopeless? 1-->several days   Trouble falling or staying asleep, or sleeping too much? 1-->several days   Feeling tired or having little energy? 2-->more than half the days   Poor appetite or overeating? 1-->several days   Feeling bad about yourself - or that you are a failure or have let yourself or your family down? 2-->more than half the days   Trouble concentrating on things, such as reading the newspaper or watching television? 1-->several days   Moving or speaking so slowly that other people could have noticed? Or the opposite - being so fidgety or restless that you have been moving around a lot more than usual? 0-->not at all   Thoughts that you would be better off dead, or of hurting yourself in some way? 0-->not at all   PHQ-9 Total Score 9   If you checked off any problems, how difficult have these problems made it for you to do your work, take care of things at home, or get along with other people? somewhat difficult        Assessment/Plan:  Diagnoses and all orders for this visit:    1. Attention deficit hyperactivity disorder (ADHD), predominantly inattentive type (Primary)  -     Focalin XR 20 MG 24 hr capsule; Take 1 capsule by mouth Every Morning  Dispense: 30 capsule; Refill: 0  -     dexmethylphenidate (FOCALIN) 10 MG tablet; Take 10 mg orally daily every afternoon  Dispense: 30 tablet; Refill: 0    2. PTSD (post-traumatic stress disorder)    3. MARY (generalized anxiety disorder)  -      DULoxetine (Cymbalta) 30 MG capsule; Take 1 capsule by mouth Daily.  Dispense: 30 capsule; Refill: 2    4. Moderate episode of recurrent major depressive disorder (HCC)  -     DULoxetine (Cymbalta) 30 MG capsule; Take 1 capsule by mouth Daily.  Dispense: 30 capsule; Refill: 2     Pt wants to decrease cymbalta. Feels it worked better at 30 mg. Will continue other medications.      A psychological evaluation was conducted in order to assess past and current level of functioning. Areas assessed included, but were not limited to: perception of social support, perception of ability to face and deal with challenges in life (positive functioning), anxiety symptoms, depressive symptoms, perspective on beliefs/belief system, coping skills for stress, intelligence level,  Therapeutic rapport was established. Interventions conducted today were geared towards incorporating medication management along with support for continued therapy. Education was also provided as to the med management with this provider and what to expect in subsequent sessions.    We discussed risks, benefits,goals and side effects of the above medication and the patient was agreeable with the plan.Patient was educated on the importance of compliance with treatment and follow-up appointments. Patient is aware to contact the [facility] with any worsening of symptoms. To call for questions or concerns and return early if necessary. Patent is agreeable to go to the Emergency Department or call 911 should they begin SI/HI.     Treatment Plan:   Discussed risks, benefits, and alternatives of medication. Encouraged healthy habits (eating, exercise and sleep). Call if any questions or problems arise. Medication reconciled. Controlled substance monitoring report reviewed. Provided psychoeducation.. Discussed coping strategies and current stressors. Set appropriate boundaries and limits for patient's well-being. Use distraction techniques to improve symptoms.  Access support networks.      Return in about 4 weeks (around 5/26/2022) for Follow Up 30 min.    Miranda Jack, APRN

## 2022-05-10 ENCOUNTER — OFFICE VISIT (OUTPATIENT)
Dept: BEHAVIORAL HEALTH | Facility: CLINIC | Age: 22
End: 2022-05-10

## 2022-05-10 VITALS — WEIGHT: 289.6 LBS | BODY MASS INDEX: 45.36 KG/M2

## 2022-05-10 DIAGNOSIS — F33.0 MILD EPISODE OF RECURRENT MAJOR DEPRESSIVE DISORDER: ICD-10-CM

## 2022-05-10 DIAGNOSIS — F41.1 GAD (GENERALIZED ANXIETY DISORDER): Primary | ICD-10-CM

## 2022-05-10 PROCEDURE — 90791 PSYCH DIAGNOSTIC EVALUATION: CPT | Performed by: COUNSELOR
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
## 2022-06-14 ENCOUNTER — OFFICE VISIT (OUTPATIENT)
Dept: BEHAVIORAL HEALTH | Facility: CLINIC | Age: 22
End: 2022-06-14

## 2022-06-14 VITALS
HEIGHT: 67 IN | DIASTOLIC BLOOD PRESSURE: 74 MMHG | WEIGHT: 284.4 LBS | BODY MASS INDEX: 44.64 KG/M2 | SYSTOLIC BLOOD PRESSURE: 116 MMHG

## 2022-06-14 DIAGNOSIS — F33.0 MILD EPISODE OF RECURRENT MAJOR DEPRESSIVE DISORDER: ICD-10-CM

## 2022-06-14 DIAGNOSIS — F90.0 ATTENTION DEFICIT HYPERACTIVITY DISORDER (ADHD), PREDOMINANTLY INATTENTIVE TYPE: Primary | ICD-10-CM

## 2022-06-14 DIAGNOSIS — F43.10 PTSD (POST-TRAUMATIC STRESS DISORDER): ICD-10-CM

## 2022-06-14 DIAGNOSIS — F41.1 GAD (GENERALIZED ANXIETY DISORDER): ICD-10-CM

## 2022-06-14 PROCEDURE — 99214 OFFICE O/P EST MOD 30 MIN: CPT

## 2022-06-14 RX ORDER — DEXMETHYLPHENIDATE HCL 20 MG
20 CAPSULE,EXTENDED RELEASE BIPHASIC 50-50 ORAL EVERY MORNING
Qty: 30 CAPSULE | Refills: 0 | Status: SHIPPED | OUTPATIENT
Start: 2022-06-14 | End: 2022-08-12 | Stop reason: SDUPTHER

## 2022-06-14 NOTE — PROGRESS NOTES
Follow Up Office Visit    Patient Name: Elayne Hernandez  : 2000   MRN: [id number]   Care Team: Patient Care Team:  Noy Manzano APRN as PCP - General (Family Medicine)        Chief Complaint:    Chief Complaint   Patient presents with   • ADHD   • Med Management   • Depression   • Anxiety       History of Present Illness: Elayne Hernandez is a 21 y.o. female who is here today for a medication management follow up. Patient states that medication is working well and since decreasing her Cymbalta to 30 mg she has been doing well. Since endorses some anxiety but states that since school has been out, she has not needed to take her propanolol as much.     Subjective   Review of Systems:    Review of Systems   All other systems reviewed and are negative.      Current Medications:   Current Outpatient Medications   Medication Sig Dispense Refill   • baclofen (LIORESAL) 10 MG tablet Take 1 tablet by mouth 3 (Three) Times a Day As Needed for Muscle Spasms. 90 tablet 1   • DULoxetine (Cymbalta) 30 MG capsule Take 1 capsule by mouth Daily. 30 capsule 2   • Focalin XR 20 MG 24 hr capsule Take 1 capsule by mouth Every Morning 30 capsule 0   • ibuprofen (ADVIL,MOTRIN) 200 MG tablet Take 200 mg by mouth Every 6 (Six) Hours As Needed for Mild Pain .     • loratadine (CLARITIN) 10 MG tablet Take 1 tablet by mouth Daily. 90 tablet 3   • norgestimate-ethinyl estradiol (Tri-Sprintec) 0.18/0.215/0.25 MG-35 MCG per tablet Take 1 tablet by mouth Daily. 28 tablet 12   • pantoprazole (PROTONIX) 20 MG EC tablet Take 2 tablets by mouth Daily. 90 tablet 1   • propranolol (INDERAL) 10 MG tablet 1-2 po tid prn anxiety 90 tablet 3     No current facility-administered medications for this visit.       PHQ-2/PHQ-9: Depression Screening  Little Interest or Pleasure in Doing Things: 1-->several days  Feeling Down, Depressed or Hopeless: 1-->several days  PHQ-2 Total Score: 2  Trouble Falling or Staying Asleep, or Sleeping Too Much:  1-->several days  Feeling Tired or Having Little Energy: 1-->several days  Poor Appetite or Overeatin-->several days  Feeling Bad about Yourself - or that You are a Failure or Have Let Yourself or Your Family Down: 2-->more than half the days  Trouble Concentrating on Things, Such as Reading the Newspaper or Watching Television: 2-->more than half the days  Moving or Speaking So Slowly that Other People Could Have Noticed? Or the Opposite - Being So Fidgety: 1-->several days  Thoughts that You Would be Better Off Dead or of Hurting Yourself in Some Way: 0-->not at all  PHQ-9: Brief Depression Severity Measure Score: 10  If You Checked Off Any Problems, How Difficult Have These Problems Made It For You to Do Your Work, Take Care of Things at Home, or Get Along with Other People?: somewhat difficult      PHQ-9 Score:   PHQ-9 Total Score: 10    Depression Screening:  Patient screened positive for depression based on a PHQ-9 score of 10 on 2022. Follow-up recommendations include: Suicide Risk Assessment performed. Continue medication as prescribed.    Screening for Adults With ADHD - (1-6)  1. How often do you have trouble wrapping up the final details of a project, once the challenging parts have been done?: Rarely  2. How often do you have difficulty getting things in order when you have to do a task that requires organization?: Sometimes  3. How often do you have problems remembering appointments or obligations : Very Often  4. When you have a task that requires a lot of thought, how often do you avoid or delay getting started ?: Very Often  5. How often do you fidget or squirm with your hands or feet when you have to sit down for a long time?: Very Often  6. How often do you feel overly active and compelled to do things, like you were driven by a motor?: Often  7. How often do you make careless mistakes when you have to work on a boring or difficult project?: Sometimes  8. How often do have difficulty  keeping your attention when you are doing boring or repetitive work?: Very Often  9. How often do you have difficulty concentrating on what people say to you, even when they are speaking to you: Very Often  10.How often do you misplace or have difficulty finding things at home or at work?: Very Often  11.How often are you distracted by activity or noise around you?: Very Often  12.How often do you leave your seat in meetings or other situations in which you are expected to remain seated?: Rarely  13.How often do you feel restless or fidgety?: Sometimes  14.How often do you have difficulty unwinding and relaxing when you have time to yourself?: Sometimes  15.How often do you find yourself talking too much when you are in social situations?: Very Often  16.When you’re in a conversation, how often do you find yourself finishing the sentences of the people you are talking to, before they can finish them themselves?: Very Often  17.How often do you have difficulty waiting your turn in situations when turn taking is required?: Often  18.How often do you interrupt others when they are busy?: Sometimes    MARY-7  Over the last two weeks, how often have you been bothered by the following problems?  Feeling nervous, anxious or on edge: More than half the days  Not being able to stop or control worrying: More than half the days  Worrying too much about different things: Nearly every day  Trouble Relaxing: More than half the days  Being so restless that it is hard to sit still: Several days  Becoming easily annoyed or irritable: Nearly every day  Feeling afraid as if something awful might happen: Nearly every day  MARY 7 Total Score: 16  If you checked any problems, how difficult have these problems made it for you to do your work, take care of things at home, or get along with other people: Somewhat difficult      Mental Status Exam:   Hygiene:   good  Cooperation:  Cooperative  Eye Contact:  Fair  Psychomotor Behavior:   "Appropriate  Affect:  Appropriate  Mood: normal  Speech:  Normal  Thought Process:  Linear  Thought Content:  Normal  Suicidal:  None  Homicidal:  None  Hallucinations:  None  Delusion:  None  Memory:  Intact  Orientation:  Person, Place, Time and Situation  Reliability:  good  Insight:  Good  Judgement:  Good  Impulse Control:  Good  Physical/Medical Issues:  No      Objective   Vital Signs:   /74   Ht 170.2 cm (67\")   Wt 129 kg (284 lb 6.4 oz)   BMI 44.54 kg/m²       Assessment / Plan    Diagnoses and all orders for this visit:    1. Attention deficit hyperactivity disorder (ADHD), predominantly inattentive type (Primary)  -     Focalin XR 20 MG 24 hr capsule; Take 1 capsule by mouth Every Morning  Dispense: 30 capsule; Refill: 0    2. MARY (generalized anxiety disorder)    3. Mild episode of recurrent major depressive disorder (HCC)    4. PTSD (post-traumatic stress disorder)       Continue other medication as prescribed. No other refills needed at this time.         MEDS ORDERED DURING VISIT:  New Medications Ordered This Visit   Medications   • Focalin XR 20 MG 24 hr capsule     Sig: Take 1 capsule by mouth Every Morning     Dispense:  30 capsule     Refill:  0         Follow Up   Return in about 3 months (around 9/14/2022).  Patient was given instructions and counseling regarding her condition or for health maintenance advice. Please see specific information pulled into the AVS if appropriate.     TREATMENT PLAN/GOALS: Continue supportive psychotherapy efforts and medications as indicated. Treatment and medication options discussed during today's visit. Patient acknowledged and verbally consented to continue with current treatment plan and was educated on the importance of compliance with treatment and follow-up appointments.    MEDICATION ISSUES:  Discussed medication options and treatment plan of prescribed medication as well as the risks, benefits, and side effects including potential falls, " possible impaired driving and metabolic adversities among others. Patient is agreeable to call the office with any worsening of symptoms or onset of side effects. Patient is agreeable to call 911 or go to the nearest ER should he/she begin having SI/HI.    VALENTINE Zavala PC [facility]  [address] [place] [id number]  [phone]    June 14, 2022 13:36 EDT

## 2022-06-21 ENCOUNTER — OFFICE VISIT (OUTPATIENT)
Dept: INTERNAL MEDICINE | Facility: CLINIC | Age: 22
End: 2022-06-21

## 2022-06-21 VITALS
DIASTOLIC BLOOD PRESSURE: 88 MMHG | OXYGEN SATURATION: 98 % | TEMPERATURE: 97.7 F | HEIGHT: 67 IN | HEART RATE: 109 BPM | SYSTOLIC BLOOD PRESSURE: 134 MMHG | WEIGHT: 284 LBS | BODY MASS INDEX: 44.57 KG/M2

## 2022-06-21 DIAGNOSIS — R19.7 DIARRHEA, UNSPECIFIED TYPE: ICD-10-CM

## 2022-06-21 DIAGNOSIS — K21.00 GASTROESOPHAGEAL REFLUX DISEASE WITH ESOPHAGITIS WITHOUT HEMORRHAGE: Primary | ICD-10-CM

## 2022-06-21 DIAGNOSIS — K44.9 HIATAL HERNIA: ICD-10-CM

## 2022-06-21 PROCEDURE — 99213 OFFICE O/P EST LOW 20 MIN: CPT | Performed by: NURSE PRACTITIONER

## 2022-06-21 NOTE — PROGRESS NOTES
"  Office Visit      Patient Name: Elayne Hernandez  : 2000   MRN: [id number]   Care Team: Patient Care Team:  Noy Manzano APRN as PCP - General (Family Medicine)    Chief Complaint  Diarrhea (Concerned of malnutrition due to having diarrhea. Started in 2018 after cholecystectomy.) and Hiatal Hernia (Diagnosed by a  in [place]. )    Subjective     Subjective    Elayne Hernandez presents to [facility] for GI problem.   Symptoms started about 3 years ago after cholecystectomy.   Endorses diarrhea, epigastric burning, and frequent indigestion..   Denies fever, chills, vomiting, unexpected weight loss, night sweats, blood in stool.   Diagnosed with a hiatal hernia in 2018 via EGD, she also reports that she had esophagitis at this time.   She is using daily pantoprazole. She is \"just living\" with the diarrhea.   She has diarrhea at least twice daily that is sometimes bright orange and other times is mucoid.   Greasy and spicy foods make the symptoms worse, she does try to avoid those.   Symptoms are not worsening, states they have been the same.   Was told she was allergic to wheat in the past? Doesn't think the doctor knew what they were doing so she doesn't follow a gluten free diet.     Review of Systems   Constitutional: Positive for fatigue. Negative for appetite change and fever.   Respiratory: Negative for cough.    Cardiovascular: Negative for chest pain.   Gastrointestinal: Positive for abdominal pain, diarrhea, nausea, GERD and indigestion. Negative for abdominal distention, anal bleeding, blood in stool, constipation and vomiting.   Genitourinary: Negative for decreased libido, dysuria, frequency and urgency.   Skin: Negative for rash.       Objective     Objective   Vital Signs:   /88   Pulse 109   Temp 97.7 °F (36.5 °C)   Ht 170.2 cm (67\")   Wt 129 kg (284 lb)   SpO2 98%   BMI 44.48 kg/m²     Physical Exam  Vitals and nursing note reviewed. "   Constitutional:       Appearance: Normal appearance. She is obese. She is not ill-appearing.   HENT:      Right Ear: There is impacted cerumen.   Cardiovascular:      Rate and Rhythm: Normal rate and regular rhythm.      Heart sounds: Normal heart sounds. No murmur heard.  Pulmonary:      Effort: Pulmonary effort is normal.      Breath sounds: Normal breath sounds. No wheezing.   Abdominal:      General: Abdomen is flat. Bowel sounds are normal. There is no distension.      Palpations: Abdomen is soft.      Tenderness: There is abdominal tenderness in the epigastric area. There is no right CVA tenderness or left CVA tenderness.      Hernia: No hernia is present.   Skin:     General: Skin is warm and dry.      Findings: No rash.   Neurological:      Mental Status: She is alert.   Psychiatric:         Mood and Affect: Mood normal.         Behavior: Behavior normal.          Assessment / Plan      Assessment & Plan   Problem List Items Addressed This Visit    None     Visit Diagnoses     Gastroesophageal reflux disease with esophagitis without hemorrhage    -  Primary    Relevant Orders    Celiac Disease Panel    Helicobacter Pylori, IgA IgG IgM    CBC w AUTO Differential    Ambulatory Referral to Gastroenterology    Diarrhea, unspecified type        Relevant Orders    Celiac Disease Panel    Helicobacter Pylori, IgA IgG IgM    CBC w AUTO Differential    Ambulatory Referral to Gastroenterology    Hiatal hernia        Relevant Orders    Celiac Disease Panel    Helicobacter Pylori, IgA IgG IgM    CBC w AUTO Differential    Ambulatory Referral to Gastroenterology    Symptoms are uncontrolled. Lab workup as above and will refer to GI for further recommendations due to chronicity. Escalate treatment as indicated. Continue PPI for now and recommend increasing fiber in the diet- examples given. Avoid eating spicy foods, caffeinated and carbonated beverages, alcohol, and chocolate. Try not to eat or drink within 3 hours of  going to bed at night. May elevate the head of the bed. Eat smaller portions. Follow-up here in 8 weeks.          Follow Up   Return in about 8 weeks (around 8/16/2022) for Next scheduled follow up.  Patient was given instructions and counseling regarding her condition or for health maintenance advice. Please see specific information pulled into the AVS if appropriate.     VALENTINE Natarajan  [facility] [place]

## 2022-06-22 DIAGNOSIS — D75.1 ERYTHROCYTOSIS: Primary | ICD-10-CM

## 2022-06-22 LAB
BASOPHILS # BLD AUTO: 0.04 10*3/MM3 (ref 0–0.2)
BASOPHILS NFR BLD AUTO: 0.5 % (ref 0–1.5)
ENDOMYSIUM IGA SER QL: NEGATIVE
EOSINOPHIL # BLD AUTO: 0.24 10*3/MM3 (ref 0–0.4)
EOSINOPHIL NFR BLD AUTO: 3 % (ref 0.3–6.2)
ERYTHROCYTE [DISTWIDTH] IN BLOOD BY AUTOMATED COUNT: 13.5 % (ref 12.3–15.4)
H PYLORI IGA SER-ACNC: <9 UNITS (ref 0–8.9)
H PYLORI IGG SER IA-ACNC: 0.21 INDEX VALUE (ref 0–0.79)
H PYLORI IGM SER-ACNC: <9 UNITS (ref 0–8.9)
HCT VFR BLD AUTO: 49 % (ref 34–46.6)
HGB BLD-MCNC: 16 G/DL (ref 12–15.9)
IGA SERPL-MCNC: 103 MG/DL (ref 87–352)
IMM GRANULOCYTES # BLD AUTO: 0.03 10*3/MM3 (ref 0–0.05)
IMM GRANULOCYTES NFR BLD AUTO: 0.4 % (ref 0–0.5)
LYMPHOCYTES # BLD AUTO: 2.14 10*3/MM3 (ref 0.7–3.1)
LYMPHOCYTES NFR BLD AUTO: 26.5 % (ref 19.6–45.3)
MCH RBC QN AUTO: 28 PG (ref 26.6–33)
MCHC RBC AUTO-ENTMCNC: 32.7 G/DL (ref 31.5–35.7)
MCV RBC AUTO: 85.8 FL (ref 79–97)
MONOCYTES # BLD AUTO: 0.53 10*3/MM3 (ref 0.1–0.9)
MONOCYTES NFR BLD AUTO: 6.6 % (ref 5–12)
NEUTROPHILS # BLD AUTO: 5.09 10*3/MM3 (ref 1.7–7)
NEUTROPHILS NFR BLD AUTO: 63 % (ref 42.7–76)
NRBC BLD AUTO-RTO: 0 /100 WBC (ref 0–0.2)
PLATELET # BLD AUTO: 298 10*3/MM3 (ref 140–450)
RBC # BLD AUTO: 5.71 10*6/MM3 (ref 3.77–5.28)
TTG IGA SER-ACNC: <2 U/ML (ref 0–3)
WBC # BLD AUTO: 8.07 10*3/MM3 (ref 3.4–10.8)

## 2022-06-23 DIAGNOSIS — E78.5 DYSLIPIDEMIA: ICD-10-CM

## 2022-06-23 RX ORDER — PANTOPRAZOLE SODIUM 20 MG/1
40 TABLET, DELAYED RELEASE ORAL DAILY
Qty: 90 TABLET | Refills: 1 | Status: SHIPPED | OUTPATIENT
Start: 2022-06-23 | End: 2023-02-23

## 2022-06-23 NOTE — TELEPHONE ENCOUNTER
Rx Refill Note  Requested Prescriptions     Pending Prescriptions Disp Refills   • pantoprazole (PROTONIX) 20 MG EC tablet 90 tablet 1     Sig: Take 2 tablets by mouth Daily.      Last office visit with prescribing clinician: 6/21/2022      Next office visit with prescribing clinician: 4/18/2023            GABO URBANO MA  06/23/22, 09:40 EDT

## 2022-06-26 ENCOUNTER — TELEMEDICINE (OUTPATIENT)
Dept: FAMILY MEDICINE CLINIC | Facility: TELEHEALTH | Age: 22
End: 2022-06-26

## 2022-06-26 DIAGNOSIS — R39.89 SUSPECTED UTI: Primary | ICD-10-CM

## 2022-06-26 PROBLEM — R07.2 PRECORDIAL PAIN: Status: RESOLVED | Noted: 2021-05-10 | Resolved: 2022-06-26

## 2022-06-26 PROCEDURE — 99213 OFFICE O/P EST LOW 20 MIN: CPT | Performed by: NURSE PRACTITIONER

## 2022-06-26 RX ORDER — PHENAZOPYRIDINE HYDROCHLORIDE 200 MG/1
200 TABLET, FILM COATED ORAL 3 TIMES DAILY PRN
Qty: 6 TABLET | Refills: 0 | Status: SHIPPED | OUTPATIENT
Start: 2022-06-26 | End: 2022-06-28

## 2022-06-26 RX ORDER — NITROFURANTOIN 25; 75 MG/1; MG/1
100 CAPSULE ORAL 2 TIMES DAILY
Qty: 10 CAPSULE | Refills: 0 | Status: SHIPPED | OUTPATIENT
Start: 2022-06-26 | End: 2022-07-01

## 2022-06-26 NOTE — PATIENT INSTRUCTIONS
Drink plenty of water and avoid caffeine  If symptoms do not improve in 3-5 days follow up with your primary care provider or urgent care   Urinary Tract Infection, Adult  A urinary tract infection (UTI) is an infection of any part of the urinary tract. The urinary tract includes:  The kidneys.  The ureters.  The bladder.  The urethra.  These organs make, store, and get rid of pee (urine) in the body.  What are the causes?  This infection is caused by germs (bacteria) in your genital area. These germs grow and cause swelling (inflammation) of your urinary tract.  What increases the risk?  The following factors may make you more likely to develop this condition:  Using a small, thin tube (catheter) to drain pee.  Not being able to control when you pee or poop (incontinence).  Being female. If you are female, these things can increase the risk:  Using these methods to prevent pregnancy:  A medicine that kills sperm (spermicide).  A device that blocks sperm (diaphragm).  Having low levels of a female hormone (estrogen).  Being pregnant.  You are more likely to develop this condition if:  You have genes that add to your risk.  You are sexually active.  You take antibiotic medicines.  You have trouble peeing because of:  A prostate that is bigger than normal, if you are male.  A blockage in the part of your body that drains pee from the bladder.  A kidney stone.  A nerve condition that affects your bladder.  Not getting enough to drink.  Not peeing often enough.  You have other conditions, such as:  Diabetes.  A weak disease-fighting system (immune system).  Sickle cell disease.  Gout.  Injury of the spine.  What are the signs or symptoms?  Symptoms of this condition include:  Needing to pee right away.  Peeing small amounts often.  Pain or burning when peeing.  Blood in the pee.  Pee that smells bad or not like normal.  Trouble peeing.  Pee that is cloudy.  Fluid coming from the vagina, if you are female.  Pain in the  belly or lower back.  Other symptoms include:  Vomiting.  Not feeling hungry.  Feeling mixed up (confused). This may be the first symptom in older adults.  Being tired and grouchy (irritable).  A fever.  Watery poop (diarrhea).  How is this treated?  Taking antibiotic medicine.  Taking other medicines.  Drinking enough water.  In some cases, you may need to see a specialist.  Follow these instructions at home:    Medicines  Take over-the-counter and prescription medicines only as told by your doctor.  If you were prescribed an antibiotic medicine, take it as told by your doctor. Do not stop taking it even if you start to feel better.  General instructions  Make sure you:  Pee until your bladder is empty.  Do not hold pee for a long time.  Empty your bladder after sex.  Wipe from front to back after peeing or pooping if you are a female. Use each tissue one time when you wipe.  Drink enough fluid to keep your pee pale yellow.  Keep all follow-up visits.  Contact a doctor if:  You do not get better after 1-2 days.  Your symptoms go away and then come back.  Get help right away if:  You have very bad back pain.  You have very bad pain in your lower belly.  You have a fever.  You have chills.  You feeling like you will vomit or you vomit.  Summary  A urinary tract infection (UTI) is an infection of any part of the urinary tract.  This condition is caused by germs in your genital area.  There are many risk factors for a UTI.  Treatment includes antibiotic medicines.  Drink enough fluid to keep your pee pale yellow.  This information is not intended to replace advice given to you by your health care provider. Make sure you discuss any questions you have with your health care provider.  Document Revised: 07/30/2021 Document Reviewed: 07/30/2021  [employer] Patient Education © 2021 [employer]

## 2022-06-26 NOTE — PROGRESS NOTES
CHIEF COMPLAINT  Chief Complaint   Patient presents with   • Urinary Tract Infection         HPI  Elayne Hernandez is a 21 y.o. female  presents with complaint of    Review of Systems   Constitutional: Negative for fever.   Gastrointestinal: Negative for nausea and vomiting.   Genitourinary: Positive for dysuria, frequency and urgency. Negative for decreased urine volume, flank pain, genital sores, hematuria, pelvic pain, vaginal bleeding and vaginal discharge.       Past Medical History:   Diagnosis Date   • ADHD (attention deficit hyperactivity disorder) 03/2022   • Allergic 2013   • Anemia     iron deficiency    • Anxiety    • Depression    • Fibromyalgia, primary    • Gall stones    • GERD (gastroesophageal reflux disease)    • Migraine        Family History   Problem Relation Age of Onset   • Arthritis Mother    • Hypertension Mother    • Hyperlipidemia Mother    • Mental illness Mother    • Obesity Mother    • Anxiety disorder Mother    • Depression Mother    • Heart disease Mother         CHF   • Diabetes Father    • Hypertension Father    • Hyperlipidemia Father    • COPD Father    • Lung cancer Maternal Grandfather    • Heart attack Maternal Grandfather    • Diabetes Paternal Grandfather    • Anxiety disorder Sister    • Depression Sister    • Mental illness Sister    • Bipolar disorder Sister        Social History     Socioeconomic History   • Marital status:    Tobacco Use   • Smoking status: Never Smoker   • Smokeless tobacco: Never Used   • Tobacco comment: No tobacco, no vape   Vaping Use   • Vaping Use: Some days   • Substances: Nicotine   • Devices: Disposable   Substance and Sexual Activity   • Alcohol use: Never   • Drug use: Never   • Sexual activity: Yes     Partners: Male     Birth control/protection: Condom, OCP       Elayne Hernandez  reports that she has never smoked. She has never used smokeless tobacco.        Breastfeeding No     PHYSICAL EXAM  Physical Exam   Constitutional: She is  oriented to person, place, and time. She does not have a sickly appearance. She does not appear ill. No distress. She appears overweight.  HENT:   Head: Normocephalic and atraumatic.   Eyes: EOM are normal.   Neck: Neck normal appearance.  Pulmonary/Chest: Effort normal.  No respiratory distress.  Neurological: She is alert and oriented to person, place, and time.   Skin: Skin is dry.   Psychiatric: She has a normal mood and affect.         Diagnoses and all orders for this visit:    1. Suspected UTI (Primary)    Other orders  -     nitrofurantoin, macrocrystal-monohydrate, (Macrobid) 100 MG capsule; Take 1 capsule by mouth 2 (Two) Times a Day for 5 days.  Dispense: 10 capsule; Refill: 0  -     phenazopyridine (Pyridium) 200 MG tablet; Take 1 tablet by mouth 3 (Three) Times a Day As Needed for Bladder Spasms for up to 2 days.  Dispense: 6 tablet; Refill: 0        The use of a video visit has been reviewed with the patient and verbal informd consent has een obtained. Myself and Elayne Hernandez participated in this visit. The patient is located in [address] [place] [address]. I am located in [place], [place]. Mychart and Zoom were utilized. I spent 8 minutes in the patient's chart for this visit.           Yue Patel, APRN  06/26/2022  13:30 EDT

## 2022-07-25 ENCOUNTER — CONSULT (OUTPATIENT)
Dept: ONCOLOGY | Facility: CLINIC | Age: 22
End: 2022-07-25

## 2022-07-25 VITALS
BODY MASS INDEX: 44.89 KG/M2 | HEART RATE: 95 BPM | RESPIRATION RATE: 16 BRPM | WEIGHT: 286 LBS | HEIGHT: 67 IN | DIASTOLIC BLOOD PRESSURE: 93 MMHG | TEMPERATURE: 98.9 F | OXYGEN SATURATION: 99 % | SYSTOLIC BLOOD PRESSURE: 167 MMHG

## 2022-07-25 DIAGNOSIS — D58.2 ELEVATED HEMOGLOBIN: Primary | ICD-10-CM

## 2022-07-25 PROCEDURE — 99204 OFFICE O/P NEW MOD 45 MIN: CPT | Performed by: INTERNAL MEDICINE

## 2022-07-25 RX ORDER — ACETAMINOPHEN 500 MG
500 TABLET ORAL EVERY 6 HOURS PRN
COMMUNITY

## 2022-07-25 NOTE — PROGRESS NOTES
"     [facility]ology [facility] [place]  Office number [phone]    Fax number [phone]    New Patient Office Visit      Date: 2022     Patient Name: Elayne Hernandez  MRN: [id number]  : 2000    Referring Provider: Noy Manzano     Chief Complaint: elevated hemoglobin      History of Present Illness: Elayne Hernandez is a pleasant 21 y.o. female who presents today for evaluation of elevated hemoglobin levels.     Her father, is currently in his 50s, was diagnosed with \"Hemoglobin [place]\" study, which was a novel hemoglobinopathy associated with polycythemia.  His case report was published in blood in 2018 by Dr. Donell Hernandez.  She reports that he was offered to be involved in a research but elected against proceeding.  She has several cousins on her father side who have been told they had polycythemia, but is unaware that any of them have undergone testing as they do not like needles.  She has 1 healthy sister, and is unsure of her carrier status.     Her father, who is the original patient in the case report, has never had stroke or heart attack, but appears he may have had per article a PICC line associated DVT. Dad has had phlebotomy, unknown other treatment.     The patient has a history of regular menses every 28 days while on OCPs (sine age 15), were irregular prior to that. Menarche at 12. G0. Menses are heavy, bleeds 5-7 days, changes a pad every 3 hours.   No prior VTE or anticoagulation.     2 years ago, she was informed she was iron deficient and started on oral iron.  She recalls tolerating it well, and has been off of this therapy for approximately 1-1/2 years.   Seeing GI soon for chronic diarrhea since she has had CCK.  She is otherwise feeling well.    Review of Systems:   I have reviewed the review of systems completed by the patient. This is negative for clinically significant symptoms except as noted below. This document has been scanned into the patient's chart.    Past Medical " History:   Past Medical History:   Diagnosis Date   • ADHD (attention deficit hyperactivity disorder) 03/2022   • Allergic 2013   • Anemia     iron deficiency    • Anxiety    • Depression    • Fibromyalgia, primary    • Gall stones    • GERD (gastroesophageal reflux disease)    • Migraine        Past Surgical History:   Past Surgical History:   Procedure Laterality Date   • ABDOMINAL SURGERY  06/2018    Cholecystectomy   • ADENOIDECTOMY     • CHOLECYSTECTOMY  06/2018   • TONSILLECTOMY     • WISDOM TOOTH EXTRACTION         Family History:   Hemoglobin [place] in her father  Present with ovarian cancer at age 31.  Her uncle and grandfather also have a history of smoking associated lung cancer.  Her uncle has a history of black lung.  Social History:   Social History     Socioeconomic History   • Marital status:    Tobacco Use   • Smoking status: Never Smoker   • Smokeless tobacco: Never Used   • Tobacco comment: No tobacco, no vape   Vaping Use   • Vaping Use: Some days   • Substances: Nicotine   • Devices: Disposable   Substance and Sexual Activity   • Alcohol use: Never   • Drug use: Never   • Sexual activity: Yes     Partners: Male     Birth control/protection: Condom, OCP       Medications:     Current Outpatient Medications:   •  acetaminophen (TYLENOL) 500 MG tablet, Take 500 mg by mouth Every 6 (Six) Hours As Needed for Mild Pain ., Disp: , Rfl:   •  baclofen (LIORESAL) 10 MG tablet, Take 1 tablet by mouth 3 (Three) Times a Day As Needed for Muscle Spasms., Disp: 90 tablet, Rfl: 1  •  DULoxetine (Cymbalta) 30 MG capsule, Take 1 capsule by mouth Daily., Disp: 30 capsule, Rfl: 2  •  Focalin XR 20 MG 24 hr capsule, Take 1 capsule by mouth Every Morning, Disp: 30 capsule, Rfl: 0  •  ibuprofen (ADVIL,MOTRIN) 200 MG tablet, Take 200 mg by mouth Every 6 (Six) Hours As Needed for Mild Pain ., Disp: , Rfl:   •  loratadine (CLARITIN) 10 MG tablet, Take 1 tablet by mouth Daily., Disp: 90 tablet, Rfl: 3  •   "norgestimate-ethinyl estradiol (Tri-Sprintec) 0.18/0.215/0.25 MG-35 MCG per tablet, Take 1 tablet by mouth Daily., Disp: 28 tablet, Rfl: 12  •  pantoprazole (PROTONIX) 20 MG EC tablet, Take 2 tablets by mouth Daily. (Patient taking differently: Take 20 mg by mouth Daily.), Disp: 90 tablet, Rfl: 1  •  propranolol (INDERAL) 10 MG tablet, 1-2 po tid prn anxiety, Disp: 90 tablet, Rfl: 3    Allergies:   Allergies   Allergen Reactions   • Augmentin [Amoxicillin-Pot Clavulanate] Rash   • Levofloxacin Rash       Objective     Vital Signs:   Vitals:    07/25/22 1039   BP: 167/93   Pulse: 95   Resp: 16   Temp: 98.9 °F (37.2 °C)   TempSrc: Temporal   SpO2: 99%   Weight: 130 kg (286 lb)   Height: 170.2 cm (67\")   PainSc:   4   PainLoc: Generalized    Body mass index is 44.79 kg/m².   Pain Score    07/25/22 1039   PainSc:   4   PainLoc: Generalized       Physical Exam:   General: No acute distress. Well appearing   HEENT: Normocephalic, atraumatic. Sclera anicteric. Masked.  Neck: supple, no adenopathy.   Cardiovascular: regular rate and rhythm,. No murmurs.   Respiratory: Normal rate. Clear to auscultation bilaterally.  Abdomen: Soft, nontender, non distended with normoactive bowel sounds. No hepatosplenomegaly  Lymph: no cervical, supraclavicular or axillary adenopathy.  Neuro: Alert and oriented x 3. No focal deficits.   Ext: Symmetric, no swelling.   Psych: Euthymic      Laboratory/Imaging Reviewed:   No visits with results within 2 Week(s) from this visit.   Latest known visit with results is:   Office Visit on 06/21/2022   Component Date Value Ref Range Status   • Endomysial IgA 06/21/2022 Negative  Negative Final   • Tissue Transglutaminase IgA 06/21/2022 <2  0 - 3 U/mL Final    Comment:                               Negative        0 -  3                                Weak Positive   4 - 10                                Positive           >10   Tissue Transglutaminase (tTG) has been identified   as the endomysial " antigen.  Studies have demonstr-   ated that endomysial IgA antibodies have over 99%   specificity for gluten sensitive enteropathy.     • IgA 06/21/2022 103  87 - 352 mg/dL Final   • H. pylori IgG 06/21/2022 0.21  0.00 - 0.79 Index Value Final    Comment:                              Negative           <0.80                               Equivocal    0.80 - 0.89                               Positive           >0.89     • H. pylori, IgA ABS 06/21/2022 <9.0  0.0 - 8.9 units Final    Comment:                                 Negative          <9.0                                  Equivocal   9.0 - 11.0                                  Positive         >11.0     • H. Pylori, IgM 06/21/2022 <9.0  0.0 - 8.9 units Final    Comment:                                 Negative          <9.0                                  Equivocal   9.0 - 11.0                                  Positive         >11.0  This test was developed and its performance characteristics  determined by [facility]. It has not been cleared or approved  by the Food and Drug Administration.     • WBC 06/21/2022 8.07  3.40 - 10.80 10*3/mm3 Final   • RBC 06/21/2022 5.71 (A) 3.77 - 5.28 10*6/mm3 Final   • Hemoglobin 06/21/2022 16.0 (A) 12.0 - 15.9 g/dL Final   • Hematocrit 06/21/2022 49.0 (A) 34.0 - 46.6 % Final   • MCV 06/21/2022 85.8  79.0 - 97.0 fL Final   • MCH 06/21/2022 28.0  26.6 - 33.0 pg Final   • MCHC 06/21/2022 32.7  31.5 - 35.7 g/dL Final   • RDW 06/21/2022 13.5  12.3 - 15.4 % Final   • Platelets 06/21/2022 298  140 - 450 10*3/mm3 Final   • Neutrophil Rel % 06/21/2022 63.0  42.7 - 76.0 % Final   • Lymphocyte Rel % 06/21/2022 26.5  19.6 - 45.3 % Final   • Monocyte Rel % 06/21/2022 6.6  5.0 - 12.0 % Final   • Eosinophil Rel % 06/21/2022 3.0  0.3 - 6.2 % Final   • Basophil Rel % 06/21/2022 0.5  0.0 - 1.5 % Final   • Neutrophils Absolute 06/21/2022 5.09  1.70 - 7.00 10*3/mm3 Final   • Lymphocytes Absolute 06/21/2022 2.14  0.70 - 3.10 10*3/mm3 Final   •  Monocytes Absolute 06/21/2022 0.53  0.10 - 0.90 10*3/mm3 Final   • Eosinophils Absolute 06/21/2022 0.24  0.00 - 0.40 10*3/mm3 Final   • Basophils Absolute 06/21/2022 0.04  0.00 - 0.20 10*3/mm3 Final   • Immature Granulocyte Rel % 06/21/2022 0.4  0.0 - 0.5 % Final   • Immature Grans Absolute 06/21/2022 0.03  0.00 - 0.05 10*3/mm3 Final   • nRBC 06/21/2022 0.0  0.0 - 0.2 /100 WBC Final     Component      Latest Ref Rng & Units 1/27/2022 2/2/2022 3/1/2022 3/1/2022            10:12 AM 10:12 AM   WBC      3.40 - 10.80 10*3/mm3 9.64   Comment   RBC      3.77 - 5.28 10*6/mm3 5.94 (H)   Comment   Platelets      140 - 450 10*3/mm3 328   Appear normal.   Comment          Comment   Pathologist Review          Comment   Hemoglobin      12.0 - 15.9 g/dL 16.7 (H)   15.1   Hematocrit      34.0 - 46.6 % 51.0 (H)   46.1   MCV      79.0 - 97.0 fL 85.9   86   MCH      26.6 - 33.0 pg 28.1   28.2   MCHC      31.5 - 35.7 g/dL 32.7   32.8   RDW      12.3 - 15.4 % 12.9   13.1   Neutrophil Rel %      42.7 - 76.0 %    68   Lymphocyte Rel %      19.6 - 45.3 %    20   Monocyte Rel %      5.0 - 12.0 %    6   Eosinophil Rel %      0.3 - 6.2 %    6   Basophil Rel %      0.0 - 1.5 %    0   Neutrophils Absolute      1.70 - 7.00 10*3/mm3    6.1   Lymphocytes Absolute      0.70 - 3.10 10*3/mm3    1.8   Monocytes Absolute      0.10 - 0.90 10*3/mm3    0.6   Eosinophils Absolute      0.00 - 0.40 10*3/mm3    0.5 (H)   Basophils Absolute      0.00 - 0.20 10*3/mm3    0.0   Immature Granulocyte Rel %      0.0 - 0.5 %    0   Immature Grans, Absolute      0.00 - 0.05 10*3/mm3    0.0   nRBC      0.0 - 0.2 /100 WBC       Glucose      65 - 99 mg/dL 107 (H)      BUN      6 - 20 mg/dL 10      Creatinine      0.57 - 1.00 mg/dL 0.58      eGFR Non African Am      >60 mL/min/1.73 133      eGFR African Am      >60 mL/min/1.73 >150      BUN/Creatinine Ratio      7.0 - 25.0 17.2      Sodium      136 - 145 mmol/L 141      Potassium      3.5 - 5.2 mmol/L 4.7      Chloride       98 - 107 mmol/L 104      CO2      22.0 - 29.0 mmol/L 21.7 (L)      Calcium      8.6 - 10.5 mg/dL 9.6      Total Protein      6.0 - 8.5 g/dL 7.0      Albumin      3.50 - 5.20 g/dL 4.50      Globulin      gm/dL 2.5      A/G Ratio      g/dL 1.8      Total Bilirubin      0.0 - 1.2 mg/dL 0.3        Component      Latest Ref Rng & Units 3/1/2022 3/4/2022 4/4/2022 4/4/2022          10:12 AM   3:28 PM  3:29 PM   WBC      3.40 - 10.80 10*3/mm3 9.1      RBC      3.77 - 5.28 10*6/mm3 5.35 (H)      Platelets      140 - 450 10*3/mm3 352      Comment             Pathologist Review             Hemoglobin      12.0 - 15.9 g/dL       Hematocrit      34.0 - 46.6 %       MCV      79.0 - 97.0 fL       MCH      26.6 - 33.0 pg       MCHC      31.5 - 35.7 g/dL       RDW      12.3 - 15.4 %       Neutrophil Rel %      42.7 - 76.0 %       Lymphocyte Rel %      19.6 - 45.3 %       Monocyte Rel %      5.0 - 12.0 %       Eosinophil Rel %      0.3 - 6.2 %       Basophil Rel %      0.0 - 1.5 %       Neutrophils Absolute      1.70 - 7.00 10*3/mm3       Lymphocytes Absolute      0.70 - 3.10 10*3/mm3       Monocytes Absolute      0.10 - 0.90 10*3/mm3       Eosinophils Absolute      0.00 - 0.40 10*3/mm3       Basophils Absolute      0.00 - 0.20 10*3/mm3       Immature Granulocyte Rel %      0.0 - 0.5 %       Immature Grans, Absolute      0.00 - 0.05 10*3/mm3       nRBC      0.0 - 0.2 /100 WBC       Glucose      65 - 99 mg/dL       BUN      6 - 20 mg/dL       Creatinine      0.57 - 1.00 mg/dL       eGFR Non African Am      >60 mL/min/1.73       eGFR African Am      >60 mL/min/1.73       BUN/Creatinine Ratio      7.0 - 25.0       Sodium      136 - 145 mmol/L       Potassium      3.5 - 5.2 mmol/L       Chloride      98 - 107 mmol/L       CO2      22.0 - 29.0 mmol/L       Calcium      8.6 - 10.5 mg/dL       Total Protein      6.0 - 8.5 g/dL       Albumin      3.50 - 5.20 g/dL       Globulin      gm/dL       A/G Ratio      g/dL       Total Bilirubin       0.0 - 1.2 mg/dL         Component      Latest Ref Rng & Units 6/21/2022             WBC      3.40 - 10.80 10*3/mm3 8.07   RBC      3.77 - 5.28 10*6/mm3 5.71 (H)   Platelets      140 - 450 10*3/mm3 298   Comment          Pathologist Review          Hemoglobin      12.0 - 15.9 g/dL 16.0 (H)   Hematocrit      34.0 - 46.6 % 49.0 (H)   MCV      79.0 - 97.0 fL 85.8   MCH      26.6 - 33.0 pg 28.0   MCHC      31.5 - 35.7 g/dL 32.7   RDW      12.3 - 15.4 % 13.5   Neutrophil Rel %      42.7 - 76.0 % 63.0   Lymphocyte Rel %      19.6 - 45.3 % 26.5   Monocyte Rel %      5.0 - 12.0 % 6.6   Eosinophil Rel %      0.3 - 6.2 % 3.0   Basophil Rel %      0.0 - 1.5 % 0.5   Neutrophils Absolute      1.70 - 7.00 10*3/mm3 5.09   Lymphocytes Absolute      0.70 - 3.10 10*3/mm3 2.14   Monocytes Absolute      0.10 - 0.90 10*3/mm3 0.53   Eosinophils Absolute      0.00 - 0.40 10*3/mm3 0.24   Basophils Absolute      0.00 - 0.20 10*3/mm3 0.04   Immature Granulocyte Rel %      0.0 - 0.5 % 0.4   Immature Grans, Absolute      0.00 - 0.05 10*3/mm3 0.03   nRBC      0.0 - 0.2 /100 WBC 0.0   Glucose      65 - 99 mg/dL    BUN      6 - 20 mg/dL    Creatinine      0.57 - 1.00 mg/dL    eGFR Non African Am      >60 mL/min/1.73    eGFR African Am      >60 mL/min/1.73    BUN/Creatinine Ratio      7.0 - 25.0    Sodium      136 - 145 mmol/L    Potassium      3.5 - 5.2 mmol/L    Chloride      98 - 107 mmol/L    CO2      22.0 - 29.0 mmol/L    Calcium      8.6 - 10.5 mg/dL    Total Protein      6.0 - 8.5 g/dL    Albumin      3.50 - 5.20 g/dL    Globulin      gm/dL    A/G Ratio      g/dL    Total Bilirubin      0.0 - 1.2 mg/dL      Component      Latest Ref Rng & Units 1/27/2022   TIBC      mcg/dL 413   UIBC      112 - 346 mcg/dL 329   Iron      37 - 145 mcg/dL 84   Iron Saturation      20 - 50 % 20   Ferritin      13.00 - 150.00 ng/mL 101.00       No results found.    Assessment / Plan      Assessment/Plan:     Elevated hemoglobin  Family history of hemoglobin  Girish    We discussed the pros and cons of genetic testing.  She is ultimately interested in proceeding with testing for inherited hemoglobinopathy, but will contact me when she is ready to proceed with testing.  From my literature review, it is not clear that there are any published guidelines on management of hemoglobin hyden or its clinical signficance.  We discussed that for polycythemia secondary to myeloproliferative neoplasm, where there is a documented associated increased risk of thrombosis, anticoagulation with an aspirin and consideration of phlebotomy to lower the hemoglobin level are generally recommended.  However for secondary polycythemia which is not associated with increased thrombosis management would be different.  In the patient's case, given that she is a menstruating female, even if she were to have a documented polycythemia, treatment would likely be avoidance of iron supplementation during her menstrual years. We discussed that for women with an inherited thrombophilia, oral contraceptive use is generally contraindicated because of an increased risk of thrombosis.  However, for relatively unknown hemoglobinopathy, with no clear history of associated thrombophilia, and the only family history of DVT being provoked in the setting of a PICC line, it is difficult to commit this young 21-year-old with a history of menorrhagiato to lifelong contraceptive avoidance, absent a personal history of thrombosis. She will contact me when she elects to proceed with genetic testing, or otherwise follow-up in 3 to 4 months. Once we confirm the diagnosis we will meet back to discuss management options.     Family history of ovarian cancer  We discussed the pros and cons of genetic counseling.  She will consider further and call me if she elects to proceed.    Follow Up:   No follow-ups on file.     Cheryl Boo MD  Hematology and Oncology       Time spent on the day of service was 45 minutes inclusive of  "time before, during, and after office visit on record review, medically appropriate history and physical, counseling patient, ordering tests, documenting in the medical record, and communicating with referring provider.       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    [email or web address]/[email or web address] rights and content    Abstract  Adult hemoglobin (HbA) is made up of two pairs of globin chains. Some rare mutations of the globin chains can result in high affinity towards hemoglobin molecule thus changing the equilibrium of normal oxygen loading in lungs and the delivery of same to the tissues. Because of change in the affinity to the oxygen these mutations can result in erythrocytosis (polycythemia). Here we discuss a case of Familial Polycythemia likely due to novel hemoglobin variant.    43-year  male presents to the clinic with high hemoglobin for several years but otherwise denies any symptoms of headache, vision changes, chest pain. He had history of multiple phlebotomies. His past medical history is significant for Polycythemia, PICC line associated clot, Type2 Diabetes, Hypertension, Epidural abscess. Social history is significant for smokeless tobacco but otherwise non-smoker, non-alcoholic, not an IVDA and doesn't use testosterone. Family history is significant for his sister, her two 14year old daughters and multiple other family members with elevated hemoglobin and undergoes phlebotomies, maternal grandfather  of cancer in his 30s.    Physical examination is only significant for BMI of " "32.46 otherwise no skin discoloration or cyanosis. Laboratory data WBC 9.4 with normal differential, hemoglobin 18.2, hematocrit 55.4, platelet count 180,000, peripheral blood smear was within normal limits, negative for YASH-2, normal erythropoietin, negative for hemochromatosis, Oxygen dissociation p50 of 19 which is low indicating left shifted dissociation curve, hemoglobin electrophoresis HbA: 61.2%, HbA2: 3%, HbF: 0%, Beta variant: 35.8%. In order to further characterize beta variant Bidirectional sequence analysis for Molecular alterations was performed and the following alterations were detected Gene: HBB, DNA change: Codon 39, heterozygous CAG>CCG Protein change: P.Y1x33Kmv. [glutamine (Q) to proline (P)]. 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 (GenBank accession number [id number]).    This is a previously unreported beta chain hemoglobin variant present. This hemoglobin variant is named as Hemoglobin Girish based on the place where this is found in [place], [place]. There have been four variants reported at codon 40 of the beta globin gene, which is an external contact site between beta globin and alpha-2 globin. One variant, Hb vassa, is associated with mild hemolytic anemia. The three other variants (Hb [place], Hb [place] and Hb [place]) are not associated with clinical or hematological abnormalities. In our opinion p.Q40P is likely a cause of erythrocytosis as the same mutation is found in atleast three other patients with polycythemia who are unrelated but all of them presenting from same county. In order to further establish the causality it may be beneficial to test first degree relatives in this family in order to determine whether the p.Q40P alteration tracks with disease and is not present in unaffected individuals.    Hemoglobin threshold for phlebotomy to lower the risk of thrombosis and cardiovascular events is yet to be defined.\"    "

## 2022-08-04 ENCOUNTER — OFFICE VISIT (OUTPATIENT)
Dept: BEHAVIORAL HEALTH | Facility: CLINIC | Age: 22
End: 2022-08-04

## 2022-08-04 VITALS — HEIGHT: 67 IN | WEIGHT: 284 LBS | BODY MASS INDEX: 44.57 KG/M2

## 2022-08-04 DIAGNOSIS — F33.0 MILD EPISODE OF RECURRENT MAJOR DEPRESSIVE DISORDER: Primary | ICD-10-CM

## 2022-08-04 PROCEDURE — 90837 PSYTX W PT 60 MINUTES: CPT | Performed by: COUNSELOR

## 2022-08-12 DIAGNOSIS — F41.1 GAD (GENERALIZED ANXIETY DISORDER): ICD-10-CM

## 2022-08-12 DIAGNOSIS — F90.0 ATTENTION DEFICIT HYPERACTIVITY DISORDER (ADHD), PREDOMINANTLY INATTENTIVE TYPE: ICD-10-CM

## 2022-08-12 DIAGNOSIS — F33.1 MODERATE EPISODE OF RECURRENT MAJOR DEPRESSIVE DISORDER: ICD-10-CM

## 2022-08-12 RX ORDER — DULOXETIN HYDROCHLORIDE 30 MG/1
CAPSULE, DELAYED RELEASE ORAL
Qty: 90 CAPSULE | Refills: 0 | Status: SHIPPED | OUTPATIENT
Start: 2022-08-12 | End: 2022-10-04 | Stop reason: SDUPTHER

## 2022-08-15 RX ORDER — DEXMETHYLPHENIDATE HCL 20 MG
20 CAPSULE,EXTENDED RELEASE BIPHASIC 50-50 ORAL EVERY MORNING
Qty: 30 CAPSULE | Refills: 0 | Status: SHIPPED | OUTPATIENT
Start: 2022-08-15 | End: 2022-10-04 | Stop reason: SDUPTHER
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
## 2022-09-01 ENCOUNTER — TELEMEDICINE (OUTPATIENT)
Dept: BEHAVIORAL HEALTH | Facility: CLINIC | Age: 22
End: 2022-09-01

## 2022-09-01 DIAGNOSIS — F33.0 MILD EPISODE OF RECURRENT MAJOR DEPRESSIVE DISORDER: ICD-10-CM

## 2022-09-01 DIAGNOSIS — F41.1 GAD (GENERALIZED ANXIETY DISORDER): Primary | ICD-10-CM

## 2022-09-01 PROCEDURE — 90832 PSYTX W PT 30 MINUTES: CPT | Performed by: COUNSELOR

## 2022-09-02 ENCOUNTER — TELEPHONE (OUTPATIENT)
Dept: INTERNAL MEDICINE | Facility: CLINIC | Age: 22
End: 2022-09-02

## 2022-09-02 DIAGNOSIS — F90.0 ATTENTION DEFICIT HYPERACTIVITY DISORDER (ADHD), PREDOMINANTLY INATTENTIVE TYPE: Primary | ICD-10-CM

## 2022-09-02 NOTE — TELEPHONE ENCOUNTER
Patient states she is supposed to have a prescription for Focalin 10mg in addition to the already prescribed Focalin 20 MG.

## 2022-09-06 RX ORDER — DEXMETHYLPHENIDATE HYDROCHLORIDE 10 MG/1
10 TABLET ORAL DAILY
Qty: 30 TABLET | Refills: 0 | Status: SHIPPED | OUTPATIENT
Start: 2022-09-06 | End: 2022-10-04 | Stop reason: SDUPTHER

## 2022-09-12 ENCOUNTER — PRIOR AUTHORIZATION (OUTPATIENT)
Dept: BEHAVIORAL HEALTH | Facility: CLINIC | Age: 22
End: 2022-09-12

## 2022-09-13 ENCOUNTER — PRIOR AUTHORIZATION (OUTPATIENT)
Dept: BEHAVIORAL HEALTH | Facility: CLINIC | Age: 22
End: 2022-09-13

## 2022-09-29 ENCOUNTER — TELEMEDICINE (OUTPATIENT)
Dept: BEHAVIORAL HEALTH | Facility: CLINIC | Age: 22
End: 2022-09-29

## 2022-09-29 DIAGNOSIS — F33.0 MILD EPISODE OF RECURRENT MAJOR DEPRESSIVE DISORDER: Primary | ICD-10-CM

## 2022-09-29 DIAGNOSIS — F41.1 GAD (GENERALIZED ANXIETY DISORDER): ICD-10-CM

## 2022-09-29 PROCEDURE — 90832 PSYTX W PT 30 MINUTES: CPT | Performed by: COUNSELOR

## 2022-10-04 ENCOUNTER — OFFICE VISIT (OUTPATIENT)
Dept: BEHAVIORAL HEALTH | Facility: CLINIC | Age: 22
End: 2022-10-04

## 2022-10-04 VITALS — WEIGHT: 288.4 LBS | BODY MASS INDEX: 45.27 KG/M2 | HEIGHT: 67 IN

## 2022-10-04 DIAGNOSIS — F33.0 MILD EPISODE OF RECURRENT MAJOR DEPRESSIVE DISORDER: ICD-10-CM

## 2022-10-04 DIAGNOSIS — F33.1 MODERATE EPISODE OF RECURRENT MAJOR DEPRESSIVE DISORDER: ICD-10-CM

## 2022-10-04 DIAGNOSIS — F90.0 ATTENTION DEFICIT HYPERACTIVITY DISORDER (ADHD), PREDOMINANTLY INATTENTIVE TYPE: Primary | ICD-10-CM

## 2022-10-04 DIAGNOSIS — F43.10 PTSD (POST-TRAUMATIC STRESS DISORDER): ICD-10-CM

## 2022-10-04 DIAGNOSIS — F41.1 GAD (GENERALIZED ANXIETY DISORDER): ICD-10-CM

## 2022-10-04 PROCEDURE — 99214 OFFICE O/P EST MOD 30 MIN: CPT

## 2022-10-04 RX ORDER — DEXMETHYLPHENIDATE HCL 20 MG
20 CAPSULE,EXTENDED RELEASE BIPHASIC 50-50 ORAL EVERY MORNING
Qty: 30 CAPSULE | Refills: 0 | Status: SHIPPED | OUTPATIENT
Start: 2022-10-04 | End: 2022-11-17 | Stop reason: DRUGHIGH

## 2022-10-04 RX ORDER — BUSPIRONE HYDROCHLORIDE 10 MG/1
10 TABLET ORAL DAILY
Qty: 30 TABLET | Refills: 1 | Status: SHIPPED | OUTPATIENT
Start: 2022-10-04 | End: 2022-11-17 | Stop reason: SDUPTHER

## 2022-10-04 RX ORDER — DULOXETIN HYDROCHLORIDE 30 MG/1
30 CAPSULE, DELAYED RELEASE ORAL DAILY
Qty: 30 CAPSULE | Refills: 1 | Status: SHIPPED | OUTPATIENT
Start: 2022-10-04 | End: 2022-11-17 | Stop reason: SDUPTHER

## 2022-10-04 RX ORDER — DULOXETIN HYDROCHLORIDE 20 MG/1
20 CAPSULE, DELAYED RELEASE ORAL DAILY
Qty: 30 CAPSULE | Refills: 1 | Status: SHIPPED | OUTPATIENT
Start: 2022-10-04 | End: 2022-11-17 | Stop reason: SDUPTHER

## 2022-10-04 RX ORDER — DEXMETHYLPHENIDATE HYDROCHLORIDE 10 MG/1
10 TABLET ORAL DAILY
Qty: 30 TABLET | Refills: 0 | Status: SHIPPED | OUTPATIENT
Start: 2022-10-04 | End: 2022-11-17 | Stop reason: DRUGHIGH

## 2022-10-04 NOTE — PROGRESS NOTES
Follow Up Office Visit    Patient Name: Elayne Hernandez  : 2000   MRN: [id number]   Care Team: Patient Care Team:  Noy Manzano APRN as PCP - General (Family Medicine)  Melissa Nogueira [facility] as Counselor (Behavioral Health)  Karma Laura APRN as Nurse Practitioner (Behavioral Health)        Chief Complaint:    Chief Complaint   Patient presents with   • ADHD   • Anxiety   • Depression   • PTSD   • Med Management       History of Present Illness: Elayne Hernandez is a 22 y.o. female who is here today for a medication management follow up. Patient states that things with medication haven't been going well. She states her anxiety and depression since our last visit and feels that she needs to go back up on her medication. Focalin continues to help with her focus and attention.     Subjective   Review of Systems:    Review of Systems   Psychiatric/Behavioral: Positive for depressed mood. The patient is nervous/anxious.    All other systems reviewed and are negative.      Current Medications:   Current Outpatient Medications   Medication Sig Dispense Refill   • acetaminophen (TYLENOL) 500 MG tablet Take 500 mg by mouth Every 6 (Six) Hours As Needed for Mild Pain .     • baclofen (LIORESAL) 10 MG tablet Take 1 tablet by mouth 3 (Three) Times a Day As Needed for Muscle Spasms. 90 tablet 1   • dexmethylphenidate (FOCALIN) 10 MG tablet Take 1 tablet by mouth Daily. Every afternoon 30 tablet 0   • DULoxetine (CYMBALTA) 30 MG capsule Take 1 capsule by mouth Daily. 30 capsule 1   • Focalin XR 20 MG 24 hr capsule Take 1 capsule by mouth Every Morning 30 capsule 0   • ibuprofen (ADVIL,MOTRIN) 200 MG tablet Take 200 mg by mouth Every 6 (Six) Hours As Needed for Mild Pain .     • loratadine (CLARITIN) 10 MG tablet Take 1 tablet by mouth Daily. 90 tablet 3   • norgestimate-ethinyl estradiol (Tri-Sprintec) 0.18/0.215/0.25 MG-35 MCG per tablet Take 1 tablet by mouth Daily. 28 tablet 12   • pantoprazole  "(PROTONIX) 20 MG EC tablet Take 2 tablets by mouth Daily. (Patient taking differently: Take 20 mg by mouth Daily.) 90 tablet 1   • propranolol (INDERAL) 10 MG tablet 1-2 po tid prn anxiety 90 tablet 3   • busPIRone (BUSPAR) 10 MG tablet Take 1 tablet by mouth Daily. 30 tablet 1   • DULoxetine (Cymbalta) 20 MG capsule Take 1 capsule by mouth Daily. 30 capsule 1     No current facility-administered medications for this visit.       Mental Status Exam:   Hygiene:   good  Cooperation:  Cooperative  Eye Contact:  Good  Psychomotor Behavior:  Appropriate  Affect:  Appropriate  Mood: normal, depressed, anxious and fluctates  Speech:  Normal  Thought Process:  Goal directed and Linear  Thought Content:  Normal  Suicidal:  None  Homicidal:  None  Hallucinations:  None  Delusion:  None  Memory:  Intact  Orientation:  Person, Place, Time and Situation  Reliability:  good  Insight:  Good  Judgement:  Good  Impulse Control:  Good  Physical/Medical Issues:  No      Objective   Vital Signs:   Ht 170.2 cm (67\")   Wt 131 kg (288 lb 6.4 oz)   BMI 45.17 kg/m²       Assessment / Plan    Diagnoses and all orders for this visit:    1. Attention deficit hyperactivity disorder (ADHD), predominantly inattentive type (Primary)  -     dexmethylphenidate (FOCALIN) 10 MG tablet; Take 1 tablet by mouth Daily. Every afternoon  Dispense: 30 tablet; Refill: 0  -     Focalin XR 20 MG 24 hr capsule; Take 1 capsule by mouth Every Morning  Dispense: 30 capsule; Refill: 0    2. MARY (generalized anxiety disorder)  -     busPIRone (BUSPAR) 10 MG tablet; Take 1 tablet by mouth Daily.  Dispense: 30 tablet; Refill: 1  -     DULoxetine (CYMBALTA) 30 MG capsule; Take 1 capsule by mouth Daily.  Dispense: 30 capsule; Refill: 1    3. Mild episode of recurrent major depressive disorder (HCC)    4. PTSD (post-traumatic stress disorder)  -     DULoxetine (Cymbalta) 20 MG capsule; Take 1 capsule by mouth Daily.  Dispense: 30 capsule; Refill: 1  -     DULoxetine " (CYMBALTA) 30 MG capsule; Take 1 capsule by mouth Daily.  Dispense: 30 capsule; Refill: 1    5. Moderate episode of recurrent major depressive disorder (HCC)  -     DULoxetine (Cymbalta) 20 MG capsule; Take 1 capsule by mouth Daily.  Dispense: 30 capsule; Refill: 1  -     DULoxetine (CYMBALTA) 30 MG capsule; Take 1 capsule by mouth Daily.  Dispense: 30 capsule; Refill: 1      Will increase Cymbalta to 50mg daily and add Buspar daily. Continue Focalin as ordered.     A psychological evaluation was conducted in order to assess past and current level of functioning. Areas assessed included, but were not limited to: perception of social support, perception of ability to face and deal with challenges in life (positive functioning), anxiety symptoms, depressive symptoms, perspective on beliefs/belief system, coping skills for stress, intelligence level,  Therapeutic rapport was established. Interventions conducted today were geared towards incorporating medication management along with support for continued therapy. Education was also provided as to the med management with this provider and what to expect in subsequent sessions.      We discussed risks, benefits, goals and side effects of the above medication and the patient was agreeable with the plan. Patient was educated on the importance of compliance with treatment and follow-up appointments. Patient is aware to contact the [facility] with any worsening of symptoms. To call for questions or concerns and return early if necessary. Patent is agreeable to go to the Emergency Department or call 911 should they begin SI/HI.    PHQ-2/PHQ-9: Depression Screening  Little Interest or Pleasure in Doing Things: 2-->more than half the days  Feeling Down, Depressed or Hopeless: 2-->more than half the days  PHQ-2 Total Score: 4  Trouble Falling or Staying Asleep, or Sleeping Too Much: 1-->several days  Feeling Tired or Having Little Energy: 1-->several days  Poor Appetite or  Overeatin-->several days  Feeling Bad about Yourself - or that You are a Failure or Have Let Yourself or Your Family Down: 2-->more than half the days  Trouble Concentrating on Things, Such as Reading the Newspaper or Watching Television: 3-->nearly every day  Moving or Speaking So Slowly that Other People Could Have Noticed? Or the Opposite - Being So Fidgety: 1-->several days  Thoughts that You Would be Better Off Dead or of Hurting Yourself in Some Way: 0-->not at all  PHQ-9: Brief Depression Severity Measure Score: 13       PHQ-9 Score:   PHQ-9 Total Score: 13       Over the last two weeks, how often have you been bothered by the following problems?  Feeling nervous, anxious or on edge: Nearly every day  Not being able to stop or control worrying: Nearly every day  Worrying too much about different things: Nearly every day  Trouble Relaxing: More than half the days  Being so restless that it is hard to sit still: Several days  Becoming easily annoyed or irritable: Nearly every day  Feeling afraid as if something awful might happen: Nearly every day  MARY 7 Total Score: 18                  MEDS ORDERED DURING VISIT:  New Medications Ordered This Visit   Medications   • busPIRone (BUSPAR) 10 MG tablet     Sig: Take 1 tablet by mouth Daily.     Dispense:  30 tablet     Refill:  1   • DULoxetine (Cymbalta) 20 MG capsule     Sig: Take 1 capsule by mouth Daily.     Dispense:  30 capsule     Refill:  1   • DULoxetine (CYMBALTA) 30 MG capsule     Sig: Take 1 capsule by mouth Daily.     Dispense:  30 capsule     Refill:  1   • dexmethylphenidate (FOCALIN) 10 MG tablet     Sig: Take 1 tablet by mouth Daily. Every afternoon     Dispense:  30 tablet     Refill:  0   • Focalin XR 20 MG 24 hr capsule     Sig: Take 1 capsule by mouth Every Morning     Dispense:  30 capsule     Refill:  0         Follow Up   Return in about 6 weeks (around 11/15/2022).  Patient was given instructions and counseling regarding her condition or  for health maintenance advice. Please see specific information pulled into the AVS if appropriate.     TREATMENT PLAN/GOALS: Continue supportive psychotherapy efforts and medications as indicated. Treatment and medication options discussed during today's visit. Patient acknowledged and verbally consented to continue with current treatment plan and was educated on the importance of compliance with treatment and follow-up appointments.    MEDICATION ISSUES:  Discussed medication options and treatment plan of prescribed medication as well as the risks, benefits, and side effects including potential falls, possible impaired driving and metabolic adversities among others. Patient is agreeable to call the office with any worsening of symptoms or onset of side effects. Patient is agreeable to call 911 or go to the nearest ER should he/she begin having SI/HI.      [facility] [address] [id number]  [phone]    October 4, 2022 17:42 EDT

## 2022-10-18 NOTE — PROGRESS NOTES
Telehealth Video Therapy Visit      Date: 2022     Patient Name: Elayne Hernandez  : 2000   Time In: 10:45  Time Out: 11:12    Disclosure: You have chosen to receive care through a video visit today. Do you consent to use the phone and/or a video visit for your behavioral health today? Yes          Chief Complaint:      ICD-10-CM ICD-9-CM   1. MARY (generalized anxiety disorder)  F41.1 300.02   2. Mild episode of recurrent major depressive disorder (HCC)  F33.0 296.31       History of Present Illness: Elayne Hernandez is a 22 y.o. female that has agreed to be seen via a telehealth visit today. Elayne Hernandez has stated that they are in a secure environment for this session. The provider is located at [facility], [address] [place] [address], [address] . The patient is seen remotely at her  home, using Epic Video Visit (HIPAA compliant). The patient states that she has been doing fine, but discussed that at times she is still really anxious. The patient states that she gets the most anxious when she has to take a test at school. The patient states that she has been taking her medication, but hopes that it could possibly be increased in the future. The patient states that she plans on graduating in , but she might add another major, like horticulture. Being anxious always is normal in class, and states that she does not like to be noticed by anyone. The patient and the therapist discussed making accommodations for the school. The patient is appropriate for a telemedicine visit. I identified myself Melissa Nogueira [facility]  along with my credentials of [facility].@ can refuse to be seen remotely at any time. The electronic data is encrypted and password protected, and the patient has been advised of the potential risks to privacy, not withstanding such measures.    Subjective      Review of Systems:   The following portions of the patient's history were reviewed and updated as appropriate:  allergies, current medications, past family history, past medical history, past social history, past surgical history and problem list.    Review of Systems    Past Medical History:   Past Medical History:   Diagnosis Date   • ADHD (attention deficit hyperactivity disorder) 03/2022   • Allergic 2013   • Anemia     iron deficiency    • Anxiety    • Depression    • Fibromyalgia, primary    • Gall stones    • GERD (gastroesophageal reflux disease)    • Migraine        Past Surgical History:   Past Surgical History:   Procedure Laterality Date   • ABDOMINAL SURGERY  06/2018    Cholecystectomy   • ADENOIDECTOMY     • CHOLECYSTECTOMY  06/2018   • TONSILLECTOMY     • WISDOM TOOTH EXTRACTION         Family History:   Family History   Problem Relation Age of Onset   • Arthritis Mother    • Hypertension Mother    • Hyperlipidemia Mother    • Mental illness Mother    • Obesity Mother    • Anxiety disorder Mother    • Depression Mother    • Heart disease Mother         CHF   • Diabetes Father    • Hypertension Father    • Hyperlipidemia Father    • COPD Father    • Anxiety disorder Sister    • Depression Sister    • Mental illness Sister    • Bipolar disorder Sister    • Lung cancer Maternal Grandfather    • Heart attack Maternal Grandfather    • Diabetes Paternal Grandfather    • Lung cancer Maternal Uncle        Social History:   Social History     Socioeconomic History   • Marital status:    Tobacco Use   • Smoking status: Never   • Smokeless tobacco: Never   • Tobacco comments:     No tobacco, occasional vaping   Vaping Use   • Vaping Use: Some days   • Substances: Nicotine   • Devices: Disposable   Substance and Sexual Activity   • Alcohol use: Never   • Drug use: Never   • Sexual activity: Yes     Partners: Male     Birth control/protection: Condom, Pill, OCP, Birth control pill       Medications:     Current Outpatient Medications:   •  acetaminophen (TYLENOL) 500 MG tablet, Take 500 mg by mouth Every 6 (Six)  Hours As Needed for Mild Pain ., Disp: , Rfl:   •  baclofen (LIORESAL) 10 MG tablet, Take 1 tablet by mouth 3 (Three) Times a Day As Needed for Muscle Spasms., Disp: 90 tablet, Rfl: 1  •  busPIRone (BUSPAR) 10 MG tablet, Take 1 tablet by mouth Daily., Disp: 30 tablet, Rfl: 1  •  dexmethylphenidate (FOCALIN) 10 MG tablet, Take 1 tablet by mouth Daily. Every afternoon, Disp: 30 tablet, Rfl: 0  •  DULoxetine (Cymbalta) 20 MG capsule, Take 1 capsule by mouth Daily., Disp: 30 capsule, Rfl: 1  •  DULoxetine (CYMBALTA) 30 MG capsule, Take 1 capsule by mouth Daily., Disp: 30 capsule, Rfl: 1  •  Focalin XR 20 MG 24 hr capsule, Take 1 capsule by mouth Every Morning, Disp: 30 capsule, Rfl: 0  •  ibuprofen (ADVIL,MOTRIN) 200 MG tablet, Take 200 mg by mouth Every 6 (Six) Hours As Needed for Mild Pain ., Disp: , Rfl:   •  loratadine (CLARITIN) 10 MG tablet, Take 1 tablet by mouth Daily., Disp: 90 tablet, Rfl: 3  •  norgestimate-ethinyl estradiol (Tri-Sprintec) 0.18/0.215/0.25 MG-35 MCG per tablet, Take 1 tablet by mouth Daily., Disp: 28 tablet, Rfl: 12  •  pantoprazole (PROTONIX) 20 MG EC tablet, Take 2 tablets by mouth Daily. (Patient taking differently: Take 20 mg by mouth Daily.), Disp: 90 tablet, Rfl: 1  •  propranolol (INDERAL) 10 MG tablet, 1-2 po tid prn anxiety, Disp: 90 tablet, Rfl: 3    Allergies:   Allergies   Allergen Reactions   • Augmentin [Amoxicillin-Pot Clavulanate] Rash   • Levofloxacin Rash       PHQ-9 Score:   PHQ-9 Total Score: unknown     Objective     Physical Exam:   not currently breastfeeding. There is no height or weight on file to calculate BMI.     Physical Exam    Patient's Support Network Includes:   and mother    Prognosis: Good with Ongoing Treatment     Mental Status Exam:   Hygiene:   good  Cooperation:  Cooperative  Eye Contact:  Good  Psychomotor Behavior:  Appropriate  Affect:  Appropriate  Mood: normal  Hopelessness: Denies  Speech:  Normal  Thought Process:  Goal directed  Thought  Content:  Normal  Suicidal:  None  Homicidal:  None  Hallucinations:  None  Delusion:  None  Memory:  Intact  Orientation:  Person, place, time, and situation  Reliability:  good  Insight:  Good  Judgement:  Good  Impulse Control:  Good  Physical/Medical Issues:  No      Assessment / Plan      Assessment/Plan:   Diagnoses and all orders for this visit:    1. MARY (generalized anxiety disorder) (Primary)    2. Mild episode of recurrent major depressive disorder (HCC)         1. The therapist will continue to promote the therapeutic alliance, address the patients issues and concerns, and strengthen her self awareness, insights, and positive coping skills.     TREATMENT PLAN/GOALS: Continue supportive psychotherapy efforts and medications as indicated. Treatment and medication options discussed during today's visit. Patient ackowledged and verbally consented to continue with current treatment plan and was educated on the importance of compliance with treatment and follow-up appointments.     Counseled patient regarding multimodal approach with healthy nutrition, healthy sleep, regular physical activity, social activities, counseling, and medications.      Coping skills reviewed and encouraged positive framing of thoughts. No suicidal ideation or homicidal ideation at this time.      Assisted patient in processing above session content; acknowledged and normalized patient’s thoughts, feelings, and concerns.  Applied  positive coping skills and behavior management in session.    Allowed patient to freely discuss issues without interruption or judgment. Provided safe, confidential environment to facilitate the development of positive therapeutic relationship and encourage open, honest communication. Assisted patient in identifying risk factors which would indicate the need for higher level of care including thoughts to harm self or others and/or self-harming behavior and encouraged patient to contact this office, call 911,  or present to the nearest emergency room should any of these events occur. Discussed crisis intervention services and means to access.     Follow Up:   No follow-ups on file.    PADMA Santoyo  This document has been electronically signed by PADMA Santoyo  October 18, 2022 16:54 EDT    Please note that portions of this note were completed with a voice recognition program. Efforts were made to edit dictation, but occasionally words are mistranscribed.

## 2022-10-30 NOTE — PROGRESS NOTES
Telehealth Video Therapy Visit      Date: 2022     Patient Name: Elayne Hernandez  : 2000   Time In: 10:38  Time Out: 11:08    Disclosure: You have chosen to receive care through a video visit today. Do you consent to use the phone and/or a video visit for your behavioral health today? Yes          Chief Complaint:      ICD-10-CM ICD-9-CM   1. Mild episode of recurrent major depressive disorder (HCC)  F33.0 296.31   2. MARY (generalized anxiety disorder)  F41.1 300.02       History of Present Illness: Elayne Hernandez is a 22 y.o. female that has agreed to be seen via a telehealth visit today. Elayne Hernandez has stated that they are in a secure environment for this session. The provider is located at [facility], [address] [place] [address], [address], [place]. The patient states that depression is coming more into play now, and continues to be nervous around people. Mainly the patient is afraid to look bad around other people. The patient did discuss that she had a great weekend. The patient spent the day with her family/parents to celebrate her birthday. Discussed ways that she may be able to overcome being nervous around people. The patient is seen remotely at her  home, using Epic Video Visit (HIPAA compliant).  The patient is appropriate for a telemedicine visit. I identified myself Melissa Nogueira, [facility]  along with my credentials of [facility].@ can refuse to be seen remotely at any time. The electronic data is encrypted and password protected, and the patient has been advised of the potential risks to privacy, not withstanding such measures.    Subjective      Review of Systems:   The following portions of the patient's history were reviewed and updated as appropriate: allergies, current medications, past family history, past medical history, past social history, past surgical history and problem list.    Review of Systems    Past Medical History:   Past Medical History:   Diagnosis Date   •  ADHD (attention deficit hyperactivity disorder) 03/2022   • Allergic 2013   • Anemia     iron deficiency    • Anxiety    • Depression    • Fibromyalgia, primary    • Gall stones    • GERD (gastroesophageal reflux disease)    • Migraine        Past Surgical History:   Past Surgical History:   Procedure Laterality Date   • ABDOMINAL SURGERY  06/2018    Cholecystectomy   • ADENOIDECTOMY     • CHOLECYSTECTOMY  06/2018   • TONSILLECTOMY     • WISDOM TOOTH EXTRACTION         Family History:   Family History   Problem Relation Age of Onset   • Arthritis Mother    • Hypertension Mother    • Hyperlipidemia Mother    • Mental illness Mother    • Obesity Mother    • Anxiety disorder Mother    • Depression Mother    • Heart disease Mother         CHF   • Diabetes Father    • Hypertension Father    • Hyperlipidemia Father    • COPD Father    • Anxiety disorder Sister    • Depression Sister    • Mental illness Sister    • Bipolar disorder Sister    • Lung cancer Maternal Grandfather    • Heart attack Maternal Grandfather    • Diabetes Paternal Grandfather    • Lung cancer Maternal Uncle        Social History:   Social History     Socioeconomic History   • Marital status:    Tobacco Use   • Smoking status: Never   • Smokeless tobacco: Never   • Tobacco comments:     No tobacco, occasional vaping   Vaping Use   • Vaping Use: Some days   • Substances: Nicotine   • Devices: Disposable   Substance and Sexual Activity   • Alcohol use: Never   • Drug use: Never   • Sexual activity: Yes     Partners: Male     Birth control/protection: Condom, Pill, OCP, Birth control pill       Medications:     Current Outpatient Medications:   •  acetaminophen (TYLENOL) 500 MG tablet, Take 500 mg by mouth Every 6 (Six) Hours As Needed for Mild Pain ., Disp: , Rfl:   •  baclofen (LIORESAL) 10 MG tablet, Take 1 tablet by mouth 3 (Three) Times a Day As Needed for Muscle Spasms., Disp: 90 tablet, Rfl: 1  •  busPIRone (BUSPAR) 10 MG tablet, Take 1  tablet by mouth Daily., Disp: 30 tablet, Rfl: 1  •  dexmethylphenidate (FOCALIN) 10 MG tablet, Take 1 tablet by mouth Daily. Every afternoon, Disp: 30 tablet, Rfl: 0  •  DULoxetine (Cymbalta) 20 MG capsule, Take 1 capsule by mouth Daily., Disp: 30 capsule, Rfl: 1  •  DULoxetine (CYMBALTA) 30 MG capsule, Take 1 capsule by mouth Daily., Disp: 30 capsule, Rfl: 1  •  Focalin XR 20 MG 24 hr capsule, Take 1 capsule by mouth Every Morning, Disp: 30 capsule, Rfl: 0  •  ibuprofen (ADVIL,MOTRIN) 200 MG tablet, Take 200 mg by mouth Every 6 (Six) Hours As Needed for Mild Pain ., Disp: , Rfl:   •  loratadine (CLARITIN) 10 MG tablet, Take 1 tablet by mouth Daily., Disp: 90 tablet, Rfl: 3  •  norgestimate-ethinyl estradiol (Tri-Sprintec) 0.18/0.215/0.25 MG-35 MCG per tablet, Take 1 tablet by mouth Daily., Disp: 28 tablet, Rfl: 12  •  pantoprazole (PROTONIX) 20 MG EC tablet, Take 2 tablets by mouth Daily. (Patient taking differently: Take 20 mg by mouth Daily.), Disp: 90 tablet, Rfl: 1  •  propranolol (INDERAL) 10 MG tablet, 1-2 po tid prn anxiety, Disp: 90 tablet, Rfl: 3    Allergies:   Allergies   Allergen Reactions   • Augmentin [Amoxicillin-Pot Clavulanate] Rash   • Levofloxacin Rash       PHQ-9 Score:   PHQ-9 Total Score: unknown     Objective     Physical Exam:   not currently breastfeeding. There is no height or weight on file to calculate BMI.     Physical Exam    Patient's Support Network Includes:   and mother    Prognosis: Good with Ongoing Treatment     Mental Status Exam:   Hygiene:   good  Cooperation:  Cooperative  Eye Contact:  Good  Psychomotor Behavior:  Appropriate  Affect:  Appropriate  Mood: normal  Hopelessness: Denies  Speech:  Normal  Thought Process:  Goal directed  Thought Content:  Normal  Suicidal:  None  Homicidal:  None  Hallucinations:  None  Delusion:  None  Memory:  Intact  Orientation:  Person, place, time, and situation  Reliability:  good  Insight:  Good  Judgement:  Good  Impulse Control:   Good  Physical/Medical Issues:  No    Nothing has changed   Assessment / Plan      Assessment/Plan:   Diagnoses and all orders for this visit:    1. Mild episode of recurrent major depressive disorder (HCC) (Primary)    2. MARY (generalized anxiety disorder)         1. The therapist will continue to promote the therapeutic alliance, address the patients issues and concerns, and strengthen her self awareness, insights, and positive coping skills. Work with the patient on ways that she can build her self esteem so she will not care what others think about her.      TREATMENT PLAN/GOALS: Continue supportive psychotherapy efforts and medications as indicated. Treatment and medication options discussed during today's visit. Patient ackowledged and verbally consented to continue with current treatment plan and was educated on the importance of compliance with treatment and follow-up appointments.     Counseled patient regarding multimodal approach with healthy nutrition, healthy sleep, regular physical activity, social activities, counseling, and medications.      Coping skills reviewed and encouraged positive framing of thoughts. No suicidal ideation or homicidal ideation at this time.      Assisted patient in processing above session content; acknowledged and normalized patient’s thoughts, feelings, and concerns.  Applied  positive coping skills and behavior management in session.    Allowed patient to freely discuss issues without interruption or judgment. Provided safe, confidential environment to facilitate the development of positive therapeutic relationship and encourage open, honest communication. Assisted patient in identifying risk factors which would indicate the need for higher level of care including thoughts to harm self or others and/or self-harming behavior and encouraged patient to contact this office, call 911, or present to the nearest emergency room should any of these events occur. Discussed crisis  intervention services and means to access.     Follow Up:   No follow-ups on file.    PADMA Santoyo  This document has been electronically signed by PADMA Santoyo  October 30, 2022 15:03 EDT    Please note that portions of this note were completed with a voice recognition program. Efforts were made to edit dictation, but occasionally words are mistranscribed.

## 2022-11-17 ENCOUNTER — OFFICE VISIT (OUTPATIENT)
Dept: BEHAVIORAL HEALTH | Facility: CLINIC | Age: 22
End: 2022-11-17

## 2022-11-17 VITALS — WEIGHT: 291.6 LBS | HEIGHT: 67 IN | BODY MASS INDEX: 45.77 KG/M2

## 2022-11-17 DIAGNOSIS — F33.1 MODERATE EPISODE OF RECURRENT MAJOR DEPRESSIVE DISORDER: ICD-10-CM

## 2022-11-17 DIAGNOSIS — F90.0 ATTENTION DEFICIT HYPERACTIVITY DISORDER (ADHD), PREDOMINANTLY INATTENTIVE TYPE: Primary | ICD-10-CM

## 2022-11-17 DIAGNOSIS — F43.10 PTSD (POST-TRAUMATIC STRESS DISORDER): ICD-10-CM

## 2022-11-17 DIAGNOSIS — F41.1 GAD (GENERALIZED ANXIETY DISORDER): ICD-10-CM

## 2022-11-17 PROCEDURE — 99214 OFFICE O/P EST MOD 30 MIN: CPT

## 2022-11-17 RX ORDER — DEXMETHYLPHENIDATE HYDROCHLORIDE 10 MG/1
10 TABLET ORAL DAILY
Qty: 30 TABLET | Refills: 0 | Status: SHIPPED | OUTPATIENT
Start: 2022-11-17 | End: 2023-02-21 | Stop reason: SDUPTHER

## 2022-11-17 RX ORDER — DULOXETIN HYDROCHLORIDE 30 MG/1
30 CAPSULE, DELAYED RELEASE ORAL DAILY
Qty: 30 CAPSULE | Refills: 1 | Status: SHIPPED | OUTPATIENT
Start: 2022-11-17 | End: 2023-03-09 | Stop reason: DRUGHIGH

## 2022-11-17 RX ORDER — BUSPIRONE HYDROCHLORIDE 10 MG/1
10 TABLET ORAL DAILY
Qty: 30 TABLET | Refills: 1 | Status: SHIPPED | OUTPATIENT
Start: 2022-11-17 | End: 2023-02-23

## 2022-11-17 RX ORDER — BUPROPION HYDROCHLORIDE 150 MG/1
150 TABLET ORAL EVERY MORNING
Qty: 30 TABLET | Refills: 1 | Status: SHIPPED | OUTPATIENT
Start: 2022-11-17 | End: 2023-01-20

## 2022-11-17 RX ORDER — DEXMETHYLPHENIDATE HYDROCHLORIDE 30 MG/1
30 CAPSULE, EXTENDED RELEASE ORAL DAILY
Qty: 30 CAPSULE | Refills: 0 | Status: SHIPPED | OUTPATIENT
Start: 2022-11-17 | End: 2023-02-21 | Stop reason: DRUGHIGH

## 2022-11-17 RX ORDER — DULOXETIN HYDROCHLORIDE 20 MG/1
20 CAPSULE, DELAYED RELEASE ORAL DAILY
Qty: 30 CAPSULE | Refills: 1 | Status: SHIPPED | OUTPATIENT
Start: 2022-11-17 | End: 2023-03-09 | Stop reason: DRUGHIGH

## 2022-11-17 NOTE — PROGRESS NOTES
Follow Up Office Visit    Patient Name: Elayne Hernandez  : 2000   MRN: [id number]   Care Team: Patient Care Team:  Noy Manzano APRN as PCP - General (Family Medicine)  Melissa Nogueira LPCC as Counselor (Behavioral Health)  Karma Laura APRN as Nurse Practitioner (Behavioral Health)        Chief Complaint:    Chief Complaint   Patient presents with   • ADHD   • Anxiety   • Depression   • PTSD   • Med Management       History of Present Illness: Elayne Hernandez is a 22 y.o. female who is here today for a medication management follow up. Patient reports that she has been feeling more depressed lately so it is had to tell if her medication is working. She endorses having seasonal depression as well and feels that is playing a part in her mood also. She feels that her focus and concentration have been effected as well and doesn't think the Focalin is as effective as it once was.      Subjective   Review of Systems:    Review of Systems   Psychiatric/Behavioral: Positive for decreased concentration, depressed mood and stress. The patient is nervous/anxious.    All other systems reviewed and are negative.      Current Medications:   Current Outpatient Medications   Medication Sig Dispense Refill   • acetaminophen (TYLENOL) 500 MG tablet Take 500 mg by mouth Every 6 (Six) Hours As Needed for Mild Pain .     • baclofen (LIORESAL) 10 MG tablet Take 1 tablet by mouth 3 (Three) Times a Day As Needed for Muscle Spasms. 90 tablet 1   • busPIRone (BUSPAR) 10 MG tablet Take 1 tablet by mouth Daily. 30 tablet 1   • dexmethylphenidate (FOCALIN) 10 MG tablet Take 1 tablet by mouth Daily. Every afternoon 30 tablet 0   • DULoxetine (Cymbalta) 20 MG capsule Take 1 capsule by mouth Daily. 30 capsule 1   • DULoxetine (CYMBALTA) 30 MG capsule Take 1 capsule by mouth Daily. 30 capsule 1   • ibuprofen (ADVIL,MOTRIN) 200 MG tablet Take 200 mg by mouth Every 6 (Six) Hours As Needed for Mild Pain .     • loratadine  "(CLARITIN) 10 MG tablet Take 1 tablet by mouth Daily. 90 tablet 3   • norgestimate-ethinyl estradiol (Tri-Sprintec) 0.18/0.215/0.25 MG-35 MCG per tablet Take 1 tablet by mouth Daily. 28 tablet 12   • pantoprazole (PROTONIX) 20 MG EC tablet Take 2 tablets by mouth Daily. (Patient taking differently: Take 20 mg by mouth Daily.) 90 tablet 1   • propranolol (INDERAL) 10 MG tablet 1-2 po tid prn anxiety 90 tablet 3   • buPROPion XL (Wellbutrin XL) 150 MG 24 hr tablet Take 1 tablet by mouth Every Morning. 30 tablet 1   • dexmethylphenidate XR (Focalin XR) 30 MG 24 hr capsule Take 1 capsule by mouth Daily 30 capsule 0     No current facility-administered medications for this visit.       Mental Status Exam:   Hygiene:   good  Cooperation:  Cooperative  Eye Contact:  Good  Psychomotor Behavior:  Appropriate  Affect:  Appropriate  Mood: depressed and anxious  Speech:  Normal  Thought Process:  Linear  Thought Content:  Normal and Mood congruent  Suicidal:  None  Homicidal:  None  Hallucinations:  None  Delusion:  None  Memory:  Intact  Orientation:  Person, Place, Time and Situation  Reliability:  good  Insight:  Good  Judgement:  Good  Impulse Control:  Good  Physical/Medical Issues:  No      Objective   Vital Signs:   Ht 170.2 cm (67\")   Wt 132 kg (291 lb 9.6 oz)   BMI 45.67 kg/m²       Assessment / Plan    Diagnoses and all orders for this visit:    1. Attention deficit hyperactivity disorder (ADHD), predominantly inattentive type (Primary)  -     dexmethylphenidate XR (Focalin XR) 30 MG 24 hr capsule; Take 1 capsule by mouth Daily  Dispense: 30 capsule; Refill: 0  -     dexmethylphenidate (FOCALIN) 10 MG tablet; Take 1 tablet by mouth Daily. Every afternoon  Dispense: 30 tablet; Refill: 0    2. MARY (generalized anxiety disorder)  -     busPIRone (BUSPAR) 10 MG tablet; Take 1 tablet by mouth Daily.  Dispense: 30 tablet; Refill: 1    3. PTSD (post-traumatic stress disorder)  -     buPROPion XL (Wellbutrin XL) 150 MG 24 " hr tablet; Take 1 tablet by mouth Every Morning.  Dispense: 30 tablet; Refill: 1  -     DULoxetine (Cymbalta) 20 MG capsule; Take 1 capsule by mouth Daily.  Dispense: 30 capsule; Refill: 1  -     DULoxetine (CYMBALTA) 30 MG capsule; Take 1 capsule by mouth Daily.  Dispense: 30 capsule; Refill: 1    4. Moderate episode of recurrent major depressive disorder (HCC)  -     buPROPion XL (Wellbutrin XL) 150 MG 24 hr tablet; Take 1 tablet by mouth Every Morning.  Dispense: 30 tablet; Refill: 1  -     busPIRone (BUSPAR) 10 MG tablet; Take 1 tablet by mouth Daily.  Dispense: 30 tablet; Refill: 1  -     DULoxetine (Cymbalta) 20 MG capsule; Take 1 capsule by mouth Daily.  Dispense: 30 capsule; Refill: 1  -     DULoxetine (CYMBALTA) 30 MG capsule; Take 1 capsule by mouth Daily.  Dispense: 30 capsule; Refill: 1     Will increase Focalin XR to 30mg and add Wellbutrin daily. Continue all other medication as ordered.     A psychological evaluation was conducted in order to assess past and current level of functioning. Areas assessed included, but were not limited to: perception of social support, perception of ability to face and deal with challenges in life (positive functioning), anxiety symptoms, depressive symptoms, perspective on beliefs/belief system, coping skills for stress, intelligence level,  Therapeutic rapport was established. Interventions conducted today were geared towards incorporating medication management along with support for continued therapy. Education was also provided as to the med management with this provider and what to expect in subsequent sessions.      We discussed risks, benefits, goals and side effects of the above medication and the patient was agreeable with the plan. Patient was educated on the importance of compliance with treatment and follow-up appointments. Patient is aware to contact the [facility] with any worsening of symptoms. To call for questions or concerns and return early if  necessary. Patent is agreeable to go to the Emergency Department or call 911 should they begin SI/HI.    PHQ-2/PHQ-9: Depression Screening  Little Interest or Pleasure in Doing Things: 2-->more than half the days  Feeling Down, Depressed or Hopeless: 2-->more than half the days  PHQ-2 Total Score: 4  Trouble Falling or Staying Asleep, or Sleeping Too Much: 2-->more than half the days  Feeling Tired or Having Little Energy: 2-->more than half the days  Poor Appetite or Overeatin-->more than half the days  Feeling Bad about Yourself - or that You are a Failure or Have Let Yourself or Your Family Down: 3-->nearly every day  Trouble Concentrating on Things, Such as Reading the Newspaper or Watching Television: 2-->more than half the days  Moving or Speaking So Slowly that Other People Could Have Noticed? Or the Opposite - Being So Fidgety: 1-->several days  Thoughts that You Would be Better Off Dead or of Hurting Yourself in Some Way: 0-->not at all  PHQ-9: Brief Depression Severity Measure Score: 16  If You Checked Off Any Problems, How Difficult Have These Problems Made It For You to Do Your Work, Take Care of Things at Home, or Get Along with Other People?: somewhat difficult    PHQ-9 Score:   PHQ-9 Total Score: 16    Depression Screening:  Patient screened positive for depression based on a PHQ-9 score of 16 on 2022. Follow-up recommendations include: Prescribed antidepressant medication treatment and Suicide Risk Assessment performed.    Over the last two weeks, how often have you been bothered by the following problems?  Feeling nervous, anxious or on edge: Nearly every day  Not being able to stop or control worrying: More than half the days  Worrying too much about different things: More than half the days  Trouble Relaxing: Several days  Being so restless that it is hard to sit still: More than half the days  Becoming easily annoyed or irritable: Nearly every day  Feeling afraid as if something awful  might happen: More than half the days  MARY 7 Total Score: 15  If you checked any problems, how difficult have these problems made it for you to do your work, take care of things at home, or get along with other people: Very difficult      Screening for Adults With ADHD - (1-6)  1. How often do you have trouble wrapping up the final details of a project, once the challenging parts have been done?: Sometimes  2. How often do you have difficulty getting things in order when you have to do a task that requires organization?: Rarely  3. How often do you have problems remembering appointments or obligations : Very Often  4. When you have a task that requires a lot of thought, how often do you avoid or delay getting started ?: Very Often  5. How often do you fidget or squirm with your hands or feet when you have to sit down for a long time?: Sometimes  6. How often do you feel overly active and compelled to do things, like you were driven by a motor?: Very Often  7. How often do you make careless mistakes when you have to work on a boring or difficult project?: Sometimes  8. How often do have difficulty keeping your attention when you are doing boring or repetitive work?: Often  9. How often do you have difficulty concentrating on what people say to you, even when they are speaking to you: Very Often  10.How often do you misplace or have difficulty finding things at home or at work?: Sometimes  11.How often are you distracted by activity or noise around you?: Often  12.How often do you leave your seat in meetings or other situations in which you are expected to remain seated?: Rarely  13.How often do you feel restless or fidgety?: Sometimes  14.How often do you have difficulty unwinding and relaxing when you have time to yourself?: Sometimes  15.How often do you find yourself talking too much when you are in social situations?: Very Often  16.When you’re in a conversation, how often do you find yourself finishing the  sentences of the people you are talking to, before they can finish them themselves?: Very Often  17.How often do you have difficulty waiting your turn in situations when turn taking is required?: Sometimes  18.How often do you interrupt others when they are busy?: Sometimes          MEDS ORDERED DURING VISIT:  New Medications Ordered This Visit   Medications   • dexmethylphenidate XR (Focalin XR) 30 MG 24 hr capsule     Sig: Take 1 capsule by mouth Daily     Dispense:  30 capsule     Refill:  0   • dexmethylphenidate (FOCALIN) 10 MG tablet     Sig: Take 1 tablet by mouth Daily. Every afternoon     Dispense:  30 tablet     Refill:  0   • buPROPion XL (Wellbutrin XL) 150 MG 24 hr tablet     Sig: Take 1 tablet by mouth Every Morning.     Dispense:  30 tablet     Refill:  1   • busPIRone (BUSPAR) 10 MG tablet     Sig: Take 1 tablet by mouth Daily.     Dispense:  30 tablet     Refill:  1   • DULoxetine (Cymbalta) 20 MG capsule     Sig: Take 1 capsule by mouth Daily.     Dispense:  30 capsule     Refill:  1   • DULoxetine (CYMBALTA) 30 MG capsule     Sig: Take 1 capsule by mouth Daily.     Dispense:  30 capsule     Refill:  1         Follow Up   Return in about 6 weeks (around 12/29/2022).  Patient was given instructions and counseling regarding her condition or for health maintenance advice. Please see specific information pulled into the AVS if appropriate.     TREATMENT PLAN/GOALS: Continue supportive psychotherapy efforts and medications as indicated. Treatment and medication options discussed during today's visit. Patient acknowledged and verbally consented to continue with current treatment plan and was educated on the importance of compliance with treatment and follow-up appointments.    MEDICATION ISSUES:  Discussed medication options and treatment plan of prescribed medication as well as the risks, benefits, and side effects including potential falls, possible impaired driving and metabolic adversities among  others. Patient is agreeable to call the office with any worsening of symptoms or onset of side effects. Patient is agreeable to call 911 or go to the nearest ER should he/she begin having SI/HI.      VALENTINE Zavala [facility] [address] [address]  [phone]    November 21, 2022 10:05 EST

## 2022-11-29 ENCOUNTER — OFFICE VISIT (OUTPATIENT)
Dept: BEHAVIORAL HEALTH | Facility: CLINIC | Age: 22
End: 2022-11-29

## 2022-11-29 VITALS — HEIGHT: 67 IN | BODY MASS INDEX: 44.95 KG/M2 | WEIGHT: 286.4 LBS

## 2022-11-29 DIAGNOSIS — F43.10 PTSD (POST-TRAUMATIC STRESS DISORDER): ICD-10-CM

## 2022-11-29 DIAGNOSIS — F33.1 MODERATE EPISODE OF RECURRENT MAJOR DEPRESSIVE DISORDER: Primary | ICD-10-CM

## 2022-11-29 DIAGNOSIS — F41.1 GENERALIZED ANXIETY DISORDER: ICD-10-CM

## 2022-11-29 PROCEDURE — 90834 PSYTX W PT 45 MINUTES: CPT | Performed by: COUNSELOR
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
## 2022-12-15 ENCOUNTER — OFFICE VISIT (OUTPATIENT)
Dept: BEHAVIORAL HEALTH | Facility: CLINIC | Age: 22
End: 2022-12-15
Payer: COMMERCIAL

## 2022-12-15 VITALS — BODY MASS INDEX: 44.64 KG/M2 | WEIGHT: 284.4 LBS | HEIGHT: 67 IN

## 2022-12-15 DIAGNOSIS — F33.0 MILD EPISODE OF RECURRENT MAJOR DEPRESSIVE DISORDER: Primary | ICD-10-CM

## 2022-12-15 PROCEDURE — 90834 PSYTX W PT 45 MINUTES: CPT | Performed by: COUNSELOR
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
## 2023-01-05 ENCOUNTER — TELEMEDICINE (OUTPATIENT)
Dept: BEHAVIORAL HEALTH | Facility: CLINIC | Age: 23
End: 2023-01-05
Payer: COMMERCIAL

## 2023-01-05 DIAGNOSIS — F33.0 MILD EPISODE OF RECURRENT MAJOR DEPRESSIVE DISORDER: Primary | ICD-10-CM

## 2023-01-05 PROCEDURE — 90832 PSYTX W PT 30 MINUTES: CPT | Performed by: COUNSELOR

## 2023-01-20 DIAGNOSIS — F33.1 MODERATE EPISODE OF RECURRENT MAJOR DEPRESSIVE DISORDER: ICD-10-CM

## 2023-01-20 DIAGNOSIS — F43.10 PTSD (POST-TRAUMATIC STRESS DISORDER): ICD-10-CM

## 2023-01-20 RX ORDER — BENZOYL PEROXIDE
KIT TOPICAL
Qty: 90 TABLET | Refills: 1 | Status: SHIPPED | OUTPATIENT
Start: 2023-01-20

## 2023-01-20 RX ORDER — BUPROPION HYDROCHLORIDE 150 MG/1
TABLET ORAL
Qty: 30 TABLET | Refills: 0 | Status: SHIPPED | OUTPATIENT
Start: 2023-01-20 | End: 2023-02-23

## 2023-01-20 NOTE — TELEPHONE ENCOUNTER
Rx Refill Note  Requested Prescriptions     Pending Prescriptions Disp Refills   • EQ Allergy Relief 10 MG tablet [Pharmacy Med Name: EQ Allergy Relief 10 MG Oral Tablet] 30 tablet 0     Sig: Take 1 tablet by mouth once daily      Last office visit with prescribing clinician: 6/21/2022   Last telemedicine visit with prescribing clinician: 4/18/2023   Next office visit with prescribing clinician: 4/18/2023     Dorinda Casas MA  01/20/23, 11:25 EST

## 2023-01-20 NOTE — TELEPHONE ENCOUNTER
Rx Refill Note  Requested Prescriptions     Pending Prescriptions Disp Refills   • buPROPion XL (WELLBUTRIN XL) 150 MG 24 hr tablet [Pharmacy Med Name: buPROPion HCl ER (XL) 150 MG Oral Tablet Extended Release 24 Hour] 30 tablet 0     Sig: TAKE 1 TABLET BY MOUTH ONCE DAILY IN THE MORNING      Last office visit with prescribing clinician: 11/17/2022   Last telemedicine visit with prescribing clinician: 1/24/2023   Next office visit with prescribing clinician: Visit date not found                         Would you like a call back once the refill request has been completed: [] Yes [] No    If the office needs to give you a call back, can they leave a voicemail: [] Yes [] No    Sylvia Gray MA  01/20/23, 08:57 EST

## 2023-01-24 ENCOUNTER — TELEMEDICINE (OUTPATIENT)
Dept: BEHAVIORAL HEALTH | Facility: CLINIC | Age: 23
End: 2023-01-24
Payer: COMMERCIAL

## 2023-01-24 DIAGNOSIS — F33.0 MILD EPISODE OF RECURRENT MAJOR DEPRESSIVE DISORDER: Primary | ICD-10-CM

## 2023-01-24 PROCEDURE — 90834 PSYTX W PT 45 MINUTES: CPT | Performed by: COUNSELOR

## 2023-02-01 NOTE — PROGRESS NOTES
Telehealth Video Therapy Visit      Date: 2023     Patient Name: Elayne Hernandez  : 2000   Time In: 3:25  Time Out: 3:54    Disclosure: You have chosen to receive care through a video visit today. Do you consent to use the phone and/or a video visit for your behavioral health today? Yes          Chief Complaint:      ICD-10-CM ICD-9-CM   1. Mild episode of recurrent major depressive disorder (HCC)  F33.0 296.31       History of Present Illness: Elayne Hernandez is a 22 y.o. female that has agreed to be seen via a telehealth visit today. Elayne Hernandez has stated that they are in a secure environment for this session. The provider is located at [facility], [address] [place] [address], [address], [place], [place]. the patient states that she had a good Almyra with her sister, her brother-in-law, and her .this time a year the patient discussed, has been very difficult.  Her childhood friend's mom  with multiorgan failure, her 's Mamaw  due to a tumor, he her cat  in her arms of a heart attack.  Even though her and her family were dealing with the status, Almyra was a good time.  The patient has noticed that her sleeping has been very hard lately.  The patient has decided not to change her major and she is sticking with it, and she is happy that she had a part-time job working at [employer].  This is the same clinic that she brought her to,  and they offered her a job.  The patient is seen remotely at her  home, using Epic Video Visit (HIPAA compliant).  The patient is appropriate for a telemedicine visit. I identified myself Melissa Nogueira [facility]  along with my credentials of [facility].@ can refuse to be seen remotely at any time. The electronic data is encrypted and password protected, and the patient has been advised of the potential risks to privacy, not withstanding such measures.    Subjective      Review of Systems:   The following portions of the patient's  history were reviewed and updated as appropriate: allergies, current medications, past family history, past medical history, past social history, past surgical history and problem list.    Review of Systems    Past Medical History:   Past Medical History:   Diagnosis Date   • ADHD (attention deficit hyperactivity disorder) 03/2022   • Allergic 2013   • Anemia     iron deficiency    • Anxiety    • Depression    • Fibromyalgia, primary    • Gall stones    • GERD (gastroesophageal reflux disease)    • Migraine        Past Surgical History:   Past Surgical History:   Procedure Laterality Date   • ABDOMINAL SURGERY  06/2018    Cholecystectomy   • ADENOIDECTOMY     • CHOLECYSTECTOMY  06/2018   • TONSILLECTOMY     • WISDOM TOOTH EXTRACTION         Family History:   Family History   Problem Relation Age of Onset   • Arthritis Mother    • Hypertension Mother    • Hyperlipidemia Mother    • Mental illness Mother    • Obesity Mother    • Anxiety disorder Mother    • Depression Mother    • Heart disease Mother         CHF   • Diabetes Father    • Hypertension Father    • Hyperlipidemia Father    • COPD Father    • Anxiety disorder Sister    • Depression Sister    • Mental illness Sister    • Bipolar disorder Sister    • Lung cancer Maternal Grandfather    • Heart attack Maternal Grandfather    • Diabetes Paternal Grandfather    • Lung cancer Maternal Uncle        Social History:   Social History     Socioeconomic History   • Marital status:    Tobacco Use   • Smoking status: Never   • Smokeless tobacco: Never   • Tobacco comments:     No tobacco, occasional vaping   Vaping Use   • Vaping Use: Some days   • Substances: Nicotine   • Devices: Disposable   Substance and Sexual Activity   • Alcohol use: Never   • Drug use: Never   • Sexual activity: Yes     Partners: Male     Birth control/protection: Condom, Pill, OCP, Birth control pill       Medications:     Current Outpatient Medications:   •  acetaminophen (TYLENOL) 500  MG tablet, Take 500 mg by mouth Every 6 (Six) Hours As Needed for Mild Pain ., Disp: , Rfl:   •  baclofen (LIORESAL) 10 MG tablet, Take 1 tablet by mouth 3 (Three) Times a Day As Needed for Muscle Spasms., Disp: 90 tablet, Rfl: 1  •  buPROPion XL (WELLBUTRIN XL) 150 MG 24 hr tablet, TAKE 1 TABLET BY MOUTH ONCE DAILY IN THE MORNING, Disp: 30 tablet, Rfl: 0  •  busPIRone (BUSPAR) 10 MG tablet, Take 1 tablet by mouth Daily., Disp: 30 tablet, Rfl: 1  •  dexmethylphenidate (FOCALIN) 10 MG tablet, Take 1 tablet by mouth Daily. Every afternoon, Disp: 30 tablet, Rfl: 0  •  dexmethylphenidate XR (Focalin XR) 30 MG 24 hr capsule, Take 1 capsule by mouth Daily, Disp: 30 capsule, Rfl: 0  •  DULoxetine (Cymbalta) 20 MG capsule, Take 1 capsule by mouth Daily., Disp: 30 capsule, Rfl: 1  •  DULoxetine (CYMBALTA) 30 MG capsule, Take 1 capsule by mouth Daily., Disp: 30 capsule, Rfl: 1  •  EQ Allergy Relief 10 MG tablet, Take 1 tablet by mouth once daily, Disp: 90 tablet, Rfl: 1  •  ibuprofen (ADVIL,MOTRIN) 200 MG tablet, Take 200 mg by mouth Every 6 (Six) Hours As Needed for Mild Pain ., Disp: , Rfl:   •  norgestimate-ethinyl estradiol (Tri-Sprintec) 0.18/0.215/0.25 MG-35 MCG per tablet, Take 1 tablet by mouth Daily., Disp: 28 tablet, Rfl: 12  •  pantoprazole (PROTONIX) 20 MG EC tablet, Take 2 tablets by mouth Daily. (Patient taking differently: Take 20 mg by mouth Daily.), Disp: 90 tablet, Rfl: 1  •  propranolol (INDERAL) 10 MG tablet, 1-2 po tid prn anxiety, Disp: 90 tablet, Rfl: 3    Allergies:   Allergies   Allergen Reactions   • Augmentin [Amoxicillin-Pot Clavulanate] Rash   • Levofloxacin Rash       PHQ-9 Score:   PHQ-9 Total Score: unknown     Objective     Physical Exam:   not currently breastfeeding. There is no height or weight on file to calculate BMI.     Physical Exam    Patient's Support Network Includes:   and mother    Prognosis: Good with Ongoing Treatment     Mental Status Exam:   Hygiene:    good  Cooperation:  Cooperative  Eye Contact:  Good  Psychomotor Behavior:  Appropriate  Affect:  Appropriate  Mood: normal  Hopelessness: Denies  Speech:  Normal  Thought Process:  Goal directed  Thought Content:  Normal  Suicidal:  None  Homicidal:  None  Hallucinations:  None  Delusion:  None  Memory:  Intact  Orientation:  Person, place, time, and situation  Reliability:  good  Insight:  Good  Judgement:  Good  Impulse Control:  Good  Physical/Medical Issues:  No    Nothing has changed   Assessment / Plan      Assessment/Plan:   Diagnoses and all orders for this visit:    1. Mild episode of recurrent major depressive disorder (HCC) (Primary)         1. The therapist will continue to promote the therapeutic alliance, address the patients issues and concerns, and strengthen her self awareness, insights, and positive coping skills. Work with the patient on ways that she can build her self esteem so she will not care what others think about her.    Nothing has changed.  TREATMENT PLAN/GOALS: Continue supportive psychotherapy efforts and medications as indicated. Treatment and medication options discussed during today's visit. Patient ackowledged and verbally consented to continue with current treatment plan and was educated on the importance of compliance with treatment and follow-up appointments.     Counseled patient regarding multimodal approach with healthy nutrition, healthy sleep, regular physical activity, social activities, counseling, and medications.      Coping skills reviewed and encouraged positive framing of thoughts. No suicidal ideation or homicidal ideation at this time.      Assisted patient in processing above session content; acknowledged and normalized patient’s thoughts, feelings, and concerns.  Applied  positive coping skills and behavior management in session.    Allowed patient to freely discuss issues without interruption or judgment. Provided safe, confidential environment to facilitate  the development of positive therapeutic relationship and encourage open, honest communication. Assisted patient in identifying risk factors which would indicate the need for higher level of care including thoughts to harm self or others and/or self-harming behavior and encouraged patient to contact this office, call 911, or present to the nearest emergency room should any of these events occur. Discussed crisis intervention services and means to access.     Follow Up:   No follow-ups on file.    PADMA Santoyo  This document has been electronically signed by PADMA Santoyo  February 1, 2023 10:55 EST    Please note that portions of this note were completed with a voice recognition program. Efforts were made to edit dictation, but occasionally words are mistranscribed.

## 2023-02-16 NOTE — PROGRESS NOTES
Telehealth Video Therapy Visit      Date: 2023     Patient Name: Elayne Hernandez  : 2000   Time In: 3:32  Time Out: 4:15    Disclosure: You have chosen to receive care through a video visit today. Do you consent to use the phone and/or a video visit for your behavioral health today? Yes          Chief Complaint:      ICD-10-CM ICD-9-CM   1. Mild episode of recurrent major depressive disorder (HCC)  F33.0 296.31       History of Present Illness: Elayne Hernandez is a 22 y.o. female that has agreed to be seen via a telehealth visit today. Elayne Hernandez has stated that they are in a secure environment for this session. The provider is located at [facility], [address] [place] [address], [address], [place], [place].   The patient is seen remotely at her  home, using Epic Video Visit (HIPAA compliant).  The patient discussed that she has been sleeping pretty good and hard, things have not been bad.  The patient discussed that she is still having some intrusive thoughts though.  Discussed focusing her attention on something else when those intrusive thoughts invade her mind.  The patient discussed that she is not feeling well today and has a sore throat.  The patient discussed that she is still in school, and she likes her classes that she is in.  Her and her  have not had any problems, and she continues to love her job working at the [employer].  The patient discussed that she loved seeing the animals every day, and limiting on them impending them makes her feel good.  The patient is appropriate for a telemedicine visit. I identified myself Melissa Nogueira [facility]  along with my credentials of [facility].@ can refuse to be seen remotely at any time. The electronic data is encrypted and password protected, and the patient has been advised of the potential risks to privacy, not withstanding such measures.    Subjective      Review of Systems:   The following portions of the patient's history were reviewed and  updated as appropriate: allergies, current medications, past family history, past medical history, past social history, past surgical history and problem list.    Review of Systems    Past Medical History:   Past Medical History:   Diagnosis Date   • ADHD (attention deficit hyperactivity disorder) 03/2022   • Allergic 2013   • Anemia     iron deficiency    • Anxiety    • Depression    • Fibromyalgia, primary    • Gall stones    • GERD (gastroesophageal reflux disease)    • Migraine        Past Surgical History:   Past Surgical History:   Procedure Laterality Date   • ABDOMINAL SURGERY  06/2018    Cholecystectomy   • ADENOIDECTOMY     • CHOLECYSTECTOMY  06/2018   • TONSILLECTOMY     • WISDOM TOOTH EXTRACTION         Family History:   Family History   Problem Relation Age of Onset   • Arthritis Mother    • Hypertension Mother    • Hyperlipidemia Mother    • Mental illness Mother    • Obesity Mother    • Anxiety disorder Mother    • Depression Mother    • Heart disease Mother         CHF   • Diabetes Father    • Hypertension Father    • Hyperlipidemia Father    • COPD Father    • Anxiety disorder Sister    • Depression Sister    • Mental illness Sister    • Bipolar disorder Sister    • Lung cancer Maternal Grandfather    • Heart attack Maternal Grandfather    • Diabetes Paternal Grandfather    • Lung cancer Maternal Uncle        Social History:   Social History     Socioeconomic History   • Marital status:    Tobacco Use   • Smoking status: Never   • Smokeless tobacco: Never   • Tobacco comments:     No tobacco, occasional vaping   Vaping Use   • Vaping Use: Some days   • Substances: Nicotine   • Devices: Disposable   Substance and Sexual Activity   • Alcohol use: Never   • Drug use: Never   • Sexual activity: Yes     Partners: Male     Birth control/protection: Condom, Pill, OCP, Birth control pill       Medications:     Current Outpatient Medications:   •  acetaminophen (TYLENOL) 500 MG tablet, Take 500 mg by  mouth Every 6 (Six) Hours As Needed for Mild Pain ., Disp: , Rfl:   •  baclofen (LIORESAL) 10 MG tablet, Take 1 tablet by mouth 3 (Three) Times a Day As Needed for Muscle Spasms., Disp: 90 tablet, Rfl: 1  •  buPROPion XL (WELLBUTRIN XL) 150 MG 24 hr tablet, TAKE 1 TABLET BY MOUTH ONCE DAILY IN THE MORNING, Disp: 30 tablet, Rfl: 0  •  busPIRone (BUSPAR) 10 MG tablet, Take 1 tablet by mouth Daily., Disp: 30 tablet, Rfl: 1  •  dexmethylphenidate (FOCALIN) 10 MG tablet, Take 1 tablet by mouth Daily. Every afternoon, Disp: 30 tablet, Rfl: 0  •  dexmethylphenidate XR (Focalin XR) 30 MG 24 hr capsule, Take 1 capsule by mouth Daily, Disp: 30 capsule, Rfl: 0  •  DULoxetine (Cymbalta) 20 MG capsule, Take 1 capsule by mouth Daily., Disp: 30 capsule, Rfl: 1  •  DULoxetine (CYMBALTA) 30 MG capsule, Take 1 capsule by mouth Daily., Disp: 30 capsule, Rfl: 1  •  EQ Allergy Relief 10 MG tablet, Take 1 tablet by mouth once daily, Disp: 90 tablet, Rfl: 1  •  ibuprofen (ADVIL,MOTRIN) 200 MG tablet, Take 200 mg by mouth Every 6 (Six) Hours As Needed for Mild Pain ., Disp: , Rfl:   •  norgestimate-ethinyl estradiol (Tri-Sprintec) 0.18/0.215/0.25 MG-35 MCG per tablet, Take 1 tablet by mouth Daily., Disp: 28 tablet, Rfl: 12  •  pantoprazole (PROTONIX) 20 MG EC tablet, Take 2 tablets by mouth Daily. (Patient taking differently: Take 20 mg by mouth Daily.), Disp: 90 tablet, Rfl: 1  •  propranolol (INDERAL) 10 MG tablet, 1-2 po tid prn anxiety, Disp: 90 tablet, Rfl: 3    Allergies:   Allergies   Allergen Reactions   • Augmentin [Amoxicillin-Pot Clavulanate] Rash   • Levofloxacin Rash       PHQ-9 Score:   PHQ-9 Total Score: unknown     Objective     Physical Exam:   not currently breastfeeding. There is no height or weight on file to calculate BMI.     Physical Exam    Patient's Support Network Includes:   and mother    Prognosis: Good with Ongoing Treatment     Mental Status Exam:   Hygiene:   good  Cooperation:  Cooperative  Eye  Contact:  Good  Psychomotor Behavior:  Appropriate  Affect:  flat  Mood: sick  Hopelessness: Denies  Speech:  Normal  Thought Process:  Goal directed  Thought Content:  Normal  Suicidal:  None  Homicidal:  None  Hallucinations:  None  Delusion:  None  Memory:  Intact  Orientation:  Person, place, time, and situation  Reliability:  good  Insight:  Good  Judgement:  Good  Impulse Control:  Good  Physical/Medical Issues:  No    Nothing has changed   Assessment / Plan      Assessment/Plan:   Diagnoses and all orders for this visit:    1. Mild episode of recurrent major depressive disorder (HCC) (Primary)         1. The therapist will continue to promote the therapeutic alliance, address the patients issues and concerns, and strengthen her self awareness, insights, and positive coping skills. Work with the patient on ways that she can build her self esteem so she will not care what others think about her.    Nothing has changed.  TREATMENT PLAN/GOALS: Continue supportive psychotherapy efforts and medications as indicated. Treatment and medication options discussed during today's visit. Patient ackowledged and verbally consented to continue with current treatment plan and was educated on the importance of compliance with treatment and follow-up appointments.     Counseled patient regarding multimodal approach with healthy nutrition, healthy sleep, regular physical activity, social activities, counseling, and medications.      Coping skills reviewed and encouraged positive framing of thoughts. No suicidal ideation or homicidal ideation at this time.      Assisted patient in processing above session content; acknowledged and normalized patient’s thoughts, feelings, and concerns.  Applied  positive coping skills and behavior management in session.    Allowed patient to freely discuss issues without interruption or judgment. Provided safe, confidential environment to facilitate the development of positive therapeutic  relationship and encourage open, honest communication. Assisted patient in identifying risk factors which would indicate the need for higher level of care including thoughts to harm self or others and/or self-harming behavior and encouraged patient to contact this office, call 911, or present to the nearest emergency room should any of these events occur. Discussed crisis intervention services and means to access.     Follow Up:   No follow-ups on file.    PADMA Santoyo  This document has been electronically signed by PADMA Santoyo  February 21, 2023 09:48 EST    Please note that portions of this note were completed with a voice recognition program. Efforts were made to edit dictation, but occasionally words are mistranscribed.

## 2023-02-21 DIAGNOSIS — F90.0 ATTENTION DEFICIT HYPERACTIVITY DISORDER (ADHD), PREDOMINANTLY INATTENTIVE TYPE: ICD-10-CM

## 2023-02-21 RX ORDER — DEXMETHYLPHENIDATE HYDROCHLORIDE 10 MG/1
10 TABLET ORAL DAILY
Qty: 30 TABLET | Refills: 0 | Status: SHIPPED | OUTPATIENT
Start: 2023-02-21 | End: 2023-03-23 | Stop reason: SDUPTHER

## 2023-02-21 RX ORDER — DEXMETHYLPHENIDATE HCL 20 MG
20 CAPSULE,EXTENDED RELEASE BIPHASIC 50-50 ORAL EVERY MORNING
Qty: 30 CAPSULE | Refills: 0 | Status: SHIPPED | OUTPATIENT
Start: 2023-02-21 | End: 2023-03-23 | Stop reason: SDUPTHER

## 2023-02-21 NOTE — TELEPHONE ENCOUNTER
Incoming Refill Request      Medication requested (name and dose): Focalin 10mg and Focalin 20mg    Pharmacy where request should be sent: [facility]    Additional details provided by patient: Patient said that she cannot take the Focalin 30mg and would like to decrease back down to 20mg    Best call back number: verified    Does the patient have less than a 3 day supply:  [x] Yes  [] No    Tommy Niño  02/21/23, 10:25 EST

## 2023-02-23 DIAGNOSIS — F43.10 PTSD (POST-TRAUMATIC STRESS DISORDER): ICD-10-CM

## 2023-02-23 DIAGNOSIS — E78.5 DYSLIPIDEMIA: ICD-10-CM

## 2023-02-23 DIAGNOSIS — F33.1 MODERATE EPISODE OF RECURRENT MAJOR DEPRESSIVE DISORDER: ICD-10-CM

## 2023-02-23 DIAGNOSIS — F41.1 GAD (GENERALIZED ANXIETY DISORDER): ICD-10-CM

## 2023-02-23 RX ORDER — BUPROPION HYDROCHLORIDE 150 MG/1
TABLET ORAL
Qty: 30 TABLET | Refills: 0 | Status: SHIPPED | OUTPATIENT
Start: 2023-02-23 | End: 2023-03-09 | Stop reason: SDUPTHER

## 2023-02-23 RX ORDER — PANTOPRAZOLE SODIUM 20 MG/1
20 TABLET, DELAYED RELEASE ORAL DAILY
Qty: 30 TABLET | Refills: 1 | Status: SHIPPED | OUTPATIENT
Start: 2023-02-23

## 2023-02-23 RX ORDER — BUSPIRONE HYDROCHLORIDE 10 MG/1
TABLET ORAL
Qty: 30 TABLET | Refills: 0 | Status: SHIPPED | OUTPATIENT
Start: 2023-02-23 | End: 2023-03-09 | Stop reason: SDUPTHER

## 2023-02-23 NOTE — TELEPHONE ENCOUNTER
Rx Refill Note  Requested Prescriptions     Pending Prescriptions Disp Refills   • pantoprazole (PROTONIX) 20 MG EC tablet [Pharmacy Med Name: Pantoprazole Sodium 20 MG Oral Tablet Delayed Release] 180 tablet 0     Sig: Take 2 tablets by mouth once daily      Last office visit with prescribing clinician: 6/21/2022   Last telemedicine visit with prescribing clinician: 4/18/2023   Next office visit with prescribing clinician: 4/18/2023     Dorinda Casas MA  02/23/23, 12:05 EST

## 2023-03-09 ENCOUNTER — OFFICE VISIT (OUTPATIENT)
Dept: BEHAVIORAL HEALTH | Facility: CLINIC | Age: 23
End: 2023-03-09
Payer: COMMERCIAL

## 2023-03-09 DIAGNOSIS — F33.1 MODERATE EPISODE OF RECURRENT MAJOR DEPRESSIVE DISORDER: ICD-10-CM

## 2023-03-09 DIAGNOSIS — F90.0 ATTENTION DEFICIT HYPERACTIVITY DISORDER (ADHD), PREDOMINANTLY INATTENTIVE TYPE: Primary | ICD-10-CM

## 2023-03-09 DIAGNOSIS — F41.1 GAD (GENERALIZED ANXIETY DISORDER): ICD-10-CM

## 2023-03-09 DIAGNOSIS — F43.10 PTSD (POST-TRAUMATIC STRESS DISORDER): ICD-10-CM

## 2023-03-09 PROCEDURE — 99214 OFFICE O/P EST MOD 30 MIN: CPT

## 2023-03-09 PROCEDURE — 1160F RVW MEDS BY RX/DR IN RCRD: CPT

## 2023-03-09 PROCEDURE — 1159F MED LIST DOCD IN RCRD: CPT

## 2023-03-09 RX ORDER — DULOXETIN HYDROCHLORIDE 20 MG/1
20 CAPSULE, DELAYED RELEASE ORAL DAILY
Qty: 30 CAPSULE | Refills: 1 | Status: SHIPPED | OUTPATIENT
Start: 2023-03-09

## 2023-03-09 RX ORDER — BUSPIRONE HYDROCHLORIDE 10 MG/1
10 TABLET ORAL DAILY
Qty: 30 TABLET | Refills: 1 | Status: SHIPPED | OUTPATIENT
Start: 2023-03-09

## 2023-03-09 RX ORDER — BUPROPION HYDROCHLORIDE 150 MG/1
150 TABLET ORAL EVERY MORNING
Qty: 30 TABLET | Refills: 1 | Status: SHIPPED | OUTPATIENT
Start: 2023-03-09

## 2023-03-09 NOTE — PROGRESS NOTES
Follow Up Office Visit    Patient Name: Elayne Hernandez  : 2000   MRN: [id number]   Care Team: Patient Care Team:  Noy Manzano APRN as PCP - General (Family Medicine)  Melissa Nogueira [facility] as Counselor (Behavioral Health)  Karma Laura APRN as Nurse Practitioner (Behavioral Health)        Chief Complaint:    Chief Complaint   Patient presents with   • ADHD   • Anxiety   • Depression   • PTSD   • Med Management       History of Present Illness: Elayne Hernandez is a 22 y.o. female who is here today for a medication management follow up. Patient reports that she lost her medication so she has not been taking it. She does state that since she has not been taking it, she can definitely tell a difference. She is missing appointments, missing school assignments and classes. She does feels that when she was taking her Wellbutrin, Buspar and Cymbalta is was helpful and her mood and anxiety were stable.     The following portion of the patient's history were reviewed and updated appropriately: allergies, current and past medications, family history, medical history and social history.  Subjective   Review of Systems:    Review of Systems   Psychiatric/Behavioral: Positive for decreased concentration and stress. The patient is nervous/anxious.    All other systems reviewed and are negative.      Current Medications:   Current Outpatient Medications   Medication Sig Dispense Refill   • buPROPion XL (WELLBUTRIN XL) 150 MG 24 hr tablet Take 1 tablet by mouth Every Morning. 30 tablet 1   • busPIRone (BUSPAR) 10 MG tablet Take 1 tablet by mouth Daily. 30 tablet 1   • DULoxetine (Cymbalta) 20 MG capsule Take 1 capsule by mouth Daily. 30 capsule 1   • acetaminophen (TYLENOL) 500 MG tablet Take 500 mg by mouth Every 6 (Six) Hours As Needed for Mild Pain .     • baclofen (LIORESAL) 10 MG tablet Take 1 tablet by mouth 3 (Three) Times a Day As Needed for Muscle Spasms. 90 tablet 1   • dexmethylphenidate  (FOCALIN) 10 MG tablet Take 1 tablet by mouth Daily. Every afternoon 30 tablet 0   • EQ Allergy Relief 10 MG tablet Take 1 tablet by mouth once daily 90 tablet 1   • Focalin XR 20 MG 24 hr capsule Take 1 capsule by mouth Every Morning 30 capsule 0   • ibuprofen (ADVIL,MOTRIN) 200 MG tablet Take 200 mg by mouth Every 6 (Six) Hours As Needed for Mild Pain .     • norgestimate-ethinyl estradiol (Tri-Sprintec) 0.18/0.215/0.25 MG-35 MCG per tablet Take 1 tablet by mouth Daily. 28 tablet 12   • pantoprazole (PROTONIX) 20 MG EC tablet Take 1 tablet by mouth Daily. 30 tablet 1   • propranolol (INDERAL) 10 MG tablet 1-2 po tid prn anxiety 90 tablet 3     No current facility-administered medications for this visit.       Mental Status Exam:   Hygiene:   good  Cooperation:  Cooperative  Eye Contact:  Good  Psychomotor Behavior:  Appropriate  Affect:  Appropriate  Mood: normal  Speech:  Normal  Thought Process:  Goal directed and Linear  Thought Content:  Normal and Mood congruent  Suicidal:  None  Homicidal:  None  Hallucinations:  None  Delusion:  None  Memory:  Intact  Orientation:  Person, Place, Time and Situation  Reliability:  good  Insight:  Good  Judgement:  Good  Impulse Control:  Good  Physical/Medical Issues:  Yes see chart     Objective   Vital Signs:   There were no vitals taken for this visit.      Assessment / Plan    Diagnoses and all orders for this visit:    1. Attention deficit hyperactivity disorder (ADHD), predominantly inattentive type (Primary)   - Focalin XR 20 mg   - Focalin 10 mg  2. MARY (generalized anxiety disorder)  -     busPIRone (BUSPAR) 10 MG tablet; Take 1 tablet by mouth Daily.  Dispense: 30 tablet; Refill: 1    3. Moderate episode of recurrent major depressive disorder (HCC)  -     buPROPion XL (WELLBUTRIN XL) 150 MG 24 hr tablet; Take 1 tablet by mouth Every Morning.  Dispense: 30 tablet; Refill: 1  -     busPIRone (BUSPAR) 10 MG tablet; Take 1 tablet by mouth Daily.  Dispense: 30 tablet;  Refill: 1  -     DULoxetine (Cymbalta) 20 MG capsule; Take 1 capsule by mouth Daily.  Dispense: 30 capsule; Refill: 1    4. PTSD (post-traumatic stress disorder)  -     buPROPion XL (WELLBUTRIN XL) 150 MG 24 hr tablet; Take 1 tablet by mouth Every Morning.  Dispense: 30 tablet; Refill: 1  -     DULoxetine (Cymbalta) 20 MG capsule; Take 1 capsule by mouth Daily.  Dispense: 30 capsule; Refill: 1     Will continue medication as ordered. Encouraged patient to take medication as prescribed in order to properly evaluate effectiveness. Patient verbalized understanding.     A psychological evaluation was conducted in order to assess past and current level of functioning. Areas assessed included, but were not limited to: perception of social support, perception of ability to face and deal with challenges in life (positive functioning), anxiety symptoms, depressive symptoms, perspective on beliefs/belief system, coping skills for stress, intelligence level,  Therapeutic rapport was established. Interventions conducted today were geared towards incorporating medication management along with support for continued therapy. Education was also provided as to the med management with this provider and what to expect in subsequent sessions.      We discussed risks, benefits, goals and side effects of the above medication and the patient was agreeable with the plan. Patient was educated on the importance of compliance with treatment and follow-up appointments. Patient is aware to contact the [facility] with any worsening of symptoms. To call for questions or concerns and return early if necessary. Patent is agreeable to go to the Emergency Department or call 911 should they begin SI/HI.      PHQ-2/PHQ-9: Depression Screening  Little Interest or Pleasure in Doing Things: 1-->several days  Feeling Down, Depressed or Hopeless: 1-->several days  PHQ-2 Total Score: 2  Trouble Falling or Staying Asleep, or Sleeping Too Much: 1-->several  days  Feeling Tired or Having Little Energy: 2-->more than half the days  Poor Appetite or Overeatin-->several days  Feeling Bad about Yourself - or that You are a Failure or Have Let Yourself or Your Family Down: 2-->more than half the days  Trouble Concentrating on Things, Such as Reading the Newspaper or Watching Television: 3-->nearly every day  Moving or Speaking So Slowly that Other People Could Have Noticed? Or the Opposite - Being So Fidgety: 1-->several days  Thoughts that You Would be Better Off Dead or of Hurting Yourself in Some Way: 0-->not at all  PHQ-9: Brief Depression Severity Measure Score: 12  If You Checked Off Any Problems, How Difficult Have These Problems Made It For You to Do Your Work, Take Care of Things at Home, or Get Along with Other People?: somewhat difficult      PHQ-9 Score:   PHQ-9 Total Score: 12    Depression Screening:  Patient screened positive for depression based on a PHQ-9 score of 12 on 3/9/2023. Follow-up recommendations include: Prescribed antidepressant medication treatment and Suicide Risk Assessment performed.      Over the last two weeks, how often have you been bothered by the following problems?  Feeling nervous, anxious or on edge: Nearly every day  Not being able to stop or control worrying: Nearly every day  Worrying too much about different things: Nearly every day  Trouble Relaxing: More than half the days  Being so restless that it is hard to sit still: More than half the days  Becoming easily annoyed or irritable: Several days  Feeling afraid as if something awful might happen: More than half the days  MARY 7 Total Score: 16        Screening for Adults With ADHD - (1-6)  1. How often do you have trouble wrapping up the final details of a project, once the challenging parts have been done?: Sometimes  2. How often do you have difficulty getting things in order when you have to do a task that requires organization?: Sometimes  3. How often do you have  problems remembering appointments or obligations : Very Often  4. When you have a task that requires a lot of thought, how often do you avoid or delay getting started ?: Often  5. How often do you fidget or squirm with your hands or feet when you have to sit down for a long time?: Often  6. How often do you feel overly active and compelled to do things, like you were driven by a motor?: Often  7. How often do you make careless mistakes when you have to work on a boring or difficult project?: Rarely  8. How often do have difficulty keeping your attention when you are doing boring or repetitive work?: Sometimes  9. How often do you have difficulty concentrating on what people say to you, even when they are speaking to you: Often  10.How often do you misplace or have difficulty finding things at home or at work?: Very Often  11.How often are you distracted by activity or noise around you?: Very Often  12.How often do you leave your seat in meetings or other situations in which you are expected to remain seated?: Rarely  13.How often do you feel restless or fidgety?: Often  14.How often do you have difficulty unwinding and relaxing when you have time to yourself?: Sometimes  15.How often do you find yourself talking too much when you are in social situations?: Very Often  16.When you’re in a conversation, how often do you find yourself finishing the sentences of the people you are talking to, before they can finish them themselves?: Very Often  17.How often do you have difficulty waiting your turn in situations when turn taking is required?: Often  18.How often do you interrupt others when they are busy?: Sometimes        MEDS ORDERED DURING VISIT:  New Medications Ordered This Visit   Medications   • buPROPion XL (WELLBUTRIN XL) 150 MG 24 hr tablet     Sig: Take 1 tablet by mouth Every Morning.     Dispense:  30 tablet     Refill:  1   • busPIRone (BUSPAR) 10 MG tablet     Sig: Take 1 tablet by mouth Daily.      Dispense:  30 tablet     Refill:  1   • DULoxetine (Cymbalta) 20 MG capsule     Sig: Take 1 capsule by mouth Daily.     Dispense:  30 capsule     Refill:  1         Follow Up   Return in about 8 weeks (around 5/4/2023).  Patient was given instructions and counseling regarding her condition or for health maintenance advice. Please see specific information pulled into the AVS if appropriate.     TREATMENT PLAN/GOALS: Continue supportive psychotherapy efforts and medications as indicated. Treatment and medication options discussed during today's visit. Patient acknowledged and verbally consented to continue with current treatment plan and was educated on the importance of compliance with treatment and follow-up appointments.    MEDICATION ISSUES:  Discussed medication options and treatment plan of prescribed medication as well as the risks, benefits, and side effects including potential falls, possible impaired driving and metabolic adversities among others. Patient is agreeable to call the office with any worsening of symptoms or onset of side effects. Patient is agreeable to call 911 or go to the nearest ER should he/she begin having SI/HI.        [facility]ENT[facility] [address] [address]  [phone]    March 9, 2023 17:13 EST

## 2023-03-23 DIAGNOSIS — Z30.011 ENCOUNTER FOR INITIAL PRESCRIPTION OF CONTRACEPTIVE PILLS: ICD-10-CM

## 2023-03-23 DIAGNOSIS — F90.0 ATTENTION DEFICIT HYPERACTIVITY DISORDER (ADHD), PREDOMINANTLY INATTENTIVE TYPE: ICD-10-CM

## 2023-03-23 RX ORDER — DEXMETHYLPHENIDATE HYDROCHLORIDE 10 MG/1
10 TABLET ORAL DAILY
Qty: 30 TABLET | Refills: 0 | Status: SHIPPED | OUTPATIENT
Start: 2023-03-23

## 2023-03-23 NOTE — TELEPHONE ENCOUNTER
Patient has called the office, requesting a refill on Folcalin 20 MG.    Verified current phone number and pharmacy on file for patient.

## 2023-03-23 NOTE — TELEPHONE ENCOUNTER
Rx Refill Note  Requested Prescriptions     Pending Prescriptions Disp Refills   • dexmethylphenidate (FOCALIN) 10 MG tablet 30 tablet 0     Sig: Take 1 tablet by mouth Daily. Every afternoon      Last office visit with prescribing clinician: 3/9/2023   Last telemedicine visit with prescribing clinician: 4/18/2023   Next office visit with prescribing clinician: 5/4/2023                         Would you like a call back once the refill request has been completed: [] Yes [] No    If the office needs to give you a call back, can they leave a voicemail: [] Yes [] No    Nany Narayanan MA  03/23/23, 13:38 EDT

## 2023-03-24 RX ORDER — NORGESTIMATE AND ETHINYL ESTRADIOL 7DAYSX3 28
1 KIT ORAL DAILY
Qty: 28 TABLET | Refills: 0 | Status: SHIPPED | OUTPATIENT
Start: 2023-03-24

## 2023-03-24 NOTE — TELEPHONE ENCOUNTER
Rx Refill Note  Requested Prescriptions     Pending Prescriptions Disp Refills   • norgestimate-ethinyl estradiol (Tri-Sprintec) 0.18/0.215/0.25 MG-35 MCG per tablet 28 tablet 12     Sig: Take 1 tablet by mouth Daily.      Last office visit with prescribing clinician: 6/21/2022   Last telemedicine visit with prescribing clinician: 4/18/2023   Next office visit with prescribing clinician: 4/18/2023     Dorinda Casas MA  03/24/23, 11:46 EDT       Do you want to discuss with patient at upcoming visit? Or have patient leave a urine for pregnancy test prior to have refill sent in?  Please advise.     Last pregnancy test: 1/27/2022

## 2023-03-27 RX ORDER — DEXMETHYLPHENIDATE HCL 20 MG
20 CAPSULE,EXTENDED RELEASE BIPHASIC 50-50 ORAL EVERY MORNING
Qty: 30 CAPSULE | Refills: 0 | Status: SHIPPED | OUTPATIENT
Start: 2023-03-27

## 2023-04-27 DIAGNOSIS — Z30.011 ENCOUNTER FOR INITIAL PRESCRIPTION OF CONTRACEPTIVE PILLS: ICD-10-CM

## 2023-04-27 RX ORDER — NORGESTIMATE AND ETHINYL ESTRADIOL 7DAYSX3 28
KIT ORAL
Qty: 28 TABLET | Refills: 0 | Status: SHIPPED | OUTPATIENT
Start: 2023-04-27

## 2023-04-27 NOTE — TELEPHONE ENCOUNTER
Rx Refill Note  Requested Prescriptions     Pending Prescriptions Disp Refills   • Tri-Sprintec 0.18/0.215/0.25 MG-35 MCG per tablet [Pharmacy Med Name: Tri-Sprintec 0.18/0.215/0.25 MG-35 MCG Oral Tablet] 28 tablet 0     Sig: Take 1 tablet by mouth once daily      Last office visit with prescribing clinician: 6/21/2022   Last telemedicine visit with prescribing clinician:   Next office visit with prescribing clinician: 5/19/2023   Last lab  Last UDS

## 2023-05-04 ENCOUNTER — OFFICE VISIT (OUTPATIENT)
Dept: BEHAVIORAL HEALTH | Facility: CLINIC | Age: 23
End: 2023-05-04
Payer: COMMERCIAL

## 2023-05-04 VITALS — HEIGHT: 67 IN | WEIGHT: 293 LBS | BODY MASS INDEX: 45.99 KG/M2

## 2023-05-04 DIAGNOSIS — F33.1 MODERATE EPISODE OF RECURRENT MAJOR DEPRESSIVE DISORDER: ICD-10-CM

## 2023-05-04 DIAGNOSIS — F90.0 ATTENTION DEFICIT HYPERACTIVITY DISORDER (ADHD), PREDOMINANTLY INATTENTIVE TYPE: ICD-10-CM

## 2023-05-04 DIAGNOSIS — F43.10 PTSD (POST-TRAUMATIC STRESS DISORDER): ICD-10-CM

## 2023-05-04 DIAGNOSIS — F41.1 GAD (GENERALIZED ANXIETY DISORDER): ICD-10-CM

## 2023-05-04 PROCEDURE — 99214 OFFICE O/P EST MOD 30 MIN: CPT

## 2023-05-04 PROCEDURE — 1160F RVW MEDS BY RX/DR IN RCRD: CPT

## 2023-05-04 PROCEDURE — 1159F MED LIST DOCD IN RCRD: CPT

## 2023-05-04 RX ORDER — BUPROPION HYDROCHLORIDE 150 MG/1
150 TABLET ORAL EVERY MORNING
Qty: 30 TABLET | Refills: 1 | Status: SHIPPED | OUTPATIENT
Start: 2023-05-04

## 2023-05-04 RX ORDER — DULOXETIN HYDROCHLORIDE 30 MG/1
30 CAPSULE, DELAYED RELEASE ORAL DAILY
Qty: 30 CAPSULE | Refills: 1 | Status: SHIPPED | OUTPATIENT
Start: 2023-05-04

## 2023-05-04 RX ORDER — DEXMETHYLPHENIDATE HCL 20 MG
20 CAPSULE,EXTENDED RELEASE BIPHASIC 50-50 ORAL EVERY MORNING
Qty: 30 CAPSULE | Refills: 0 | Status: SHIPPED | OUTPATIENT
Start: 2023-05-04

## 2023-05-04 RX ORDER — BUSPIRONE HYDROCHLORIDE 10 MG/1
10 TABLET ORAL DAILY
Qty: 30 TABLET | Refills: 1 | Status: SHIPPED | OUTPATIENT
Start: 2023-05-04

## 2023-05-04 RX ORDER — DEXMETHYLPHENIDATE HYDROCHLORIDE 10 MG/1
10 TABLET ORAL DAILY
Qty: 30 TABLET | Refills: 0 | Status: SHIPPED | OUTPATIENT
Start: 2023-05-04

## 2023-05-04 NOTE — PROGRESS NOTES
Follow Up Office Visit    Patient Name: Elayne Hernandez  : 2000   MRN: [id number]   Care Team: Patient Care Team:  Noy Manzano APRN as PCP - General (Family Medicine)  Melissa Nogueira LPCC as Counselor (Behavioral Health)  Karma Laura APRN as Nurse Practitioner (Behavioral Health)        Chief Complaint:    Chief Complaint   Patient presents with   • ADHD   • Anxiety   • Depression   • PTSD   • Med Management       History of Present Illness: Elayne Hernandez is a 22 y.o. female who is here today for a medication management follow up. Patient reports that she originally thought she was only taking 20 mg of Cymbalta but after re-starting medication, she quickly realized that it was 30 mg instead. She states she has noticed a significant difference since only taking the 20 mg. She feels that her anxiety has been increased and she ha snot been able to manage it as well. She does feel that all of her other medications are working well and her focus and task completion are doing well. She denies SI/HI today    The following portion of the patient's history were reviewed and updated appropriately: allergies, current and past medications, family history, medical history and social history.  Subjective   Review of Systems:    Review of Systems   Psychiatric/Behavioral: Positive for depressed mood and stress. The patient is nervous/anxious.    All other systems reviewed and are negative.      Current Medications:   Current Outpatient Medications   Medication Sig Dispense Refill   • acetaminophen (TYLENOL) 500 MG tablet Take 1 tablet by mouth Every 6 (Six) Hours As Needed for Mild Pain.     • baclofen (LIORESAL) 10 MG tablet Take 1 tablet by mouth 3 (Three) Times a Day As Needed for Muscle Spasms. 90 tablet 1   • buPROPion XL (WELLBUTRIN XL) 150 MG 24 hr tablet Take 1 tablet by mouth Every Morning. 30 tablet 1   • busPIRone (BUSPAR) 10 MG tablet Take 1 tablet by mouth Daily. 30 tablet 1   •  "dexmethylphenidate (FOCALIN) 10 MG tablet Take 1 tablet by mouth Daily. Every afternoon 30 tablet 0   • EQ Allergy Relief 10 MG tablet Take 1 tablet by mouth once daily 90 tablet 1   • Focalin XR 20 MG 24 hr capsule Take 1 capsule by mouth Every Morning 30 capsule 0   • ibuprofen (ADVIL,MOTRIN) 200 MG tablet Take 1 tablet by mouth Every 6 (Six) Hours As Needed for Mild Pain.     • pantoprazole (PROTONIX) 20 MG EC tablet Take 1 tablet by mouth Daily. 30 tablet 1   • propranolol (INDERAL) 10 MG tablet 1-2 po tid prn anxiety 90 tablet 3   • Tri-Sprintec 0.18/0.215/0.25 MG-35 MCG per tablet Take 1 tablet by mouth once daily 28 tablet 0   • DULoxetine (Cymbalta) 30 MG capsule Take 1 capsule by mouth Daily. 30 capsule 1     No current facility-administered medications for this visit.       Mental Status Exam:   Hygiene:   good  Cooperation:  Cooperative  Eye Contact:  Good  Psychomotor Behavior:  Appropriate  Affect:  Appropriate  Mood: normal  Speech:  Normal  Thought Process:  Goal directed and Linear  Thought Content:  Normal and Mood congruent  Suicidal:  None  Homicidal:  None  Hallucinations:  None  Delusion:  None  Memory:  Intact  Orientation:  Person, Place, Time and Situation  Reliability:  good  Insight:  Good  Judgement:  Good  Impulse Control:  Good  Physical/Medical Issues:  No      Objective   Vital Signs:   Ht 170.2 cm (67\")   Wt 134 kg (295 lb)   BMI 46.20 kg/m²       Assessment / Plan    Diagnoses and all orders for this visit:    1. Attention deficit hyperactivity disorder (ADHD), predominantly inattentive type  -     dexmethylphenidate (FOCALIN) 10 MG tablet; Take 1 tablet by mouth Daily. Every afternoon  Dispense: 30 tablet; Refill: 0  -     Focalin XR 20 MG 24 hr capsule; Take 1 capsule by mouth Every Morning  Dispense: 30 capsule; Refill: 0    2. Moderate episode of recurrent major depressive disorder  -     buPROPion XL (WELLBUTRIN XL) 150 MG 24 hr tablet; Take 1 tablet by mouth Every Morning.  " Dispense: 30 tablet; Refill: 1  -     busPIRone (BUSPAR) 10 MG tablet; Take 1 tablet by mouth Daily.  Dispense: 30 tablet; Refill: 1  -     DULoxetine (Cymbalta) 30 MG capsule; Take 1 capsule by mouth Daily.  Dispense: 30 capsule; Refill: 1    3. PTSD (post-traumatic stress disorder)  -     buPROPion XL (WELLBUTRIN XL) 150 MG 24 hr tablet; Take 1 tablet by mouth Every Morning.  Dispense: 30 tablet; Refill: 1    4. MARY (generalized anxiety disorder)  -     busPIRone (BUSPAR) 10 MG tablet; Take 1 tablet by mouth Daily.  Dispense: 30 tablet; Refill: 1  -     DULoxetine (Cymbalta) 30 MG capsule; Take 1 capsule by mouth Daily.  Dispense: 30 capsule; Refill: 1       Will increase Cymbalta back up to 30 mg and continue all other medication as ordered.     A psychological evaluation was conducted in order to assess past and current level of functioning. Areas assessed included, but were not limited to: perception of social support, perception of ability to face and deal with challenges in life (positive functioning), anxiety symptoms, depressive symptoms, perspective on beliefs/belief system, coping skills for stress, intelligence level,  Therapeutic rapport was established. Interventions conducted today were geared towards incorporating medication management along with support for continued therapy. Education was also provided as to the med management with this provider and what to expect in subsequent sessions.      We discussed risks, benefits, goals and side effects of the above medication and the patient was agreeable with the plan. Patient was educated on the importance of compliance with treatment and follow-up appointments. Patient is aware to contact the [facility] with any worsening of symptoms. To call for questions or concerns and return early if necessary. Patent is agreeable to go to the Emergency Department or call 911 should they begin SI/HI.      PHQ-2/PHQ-9: Depression Screening  Little Interest or  Pleasure in Doing Things: 3-->nearly every day  Feeling Down, Depressed or Hopeless: 3-->nearly every day  PHQ-2 Total Score: 6  Trouble Falling or Staying Asleep, or Sleeping Too Much: 3-->nearly every day  Feeling Tired or Having Little Energy: 3-->nearly every day  Poor Appetite or Overeatin-->more than half the days  Feeling Bad about Yourself - or that You are a Failure or Have Let Yourself or Your Family Down: 3-->nearly every day  Trouble Concentrating on Things, Such as Reading the Newspaper or Watching Television: 2-->more than half the days  Moving or Speaking So Slowly that Other People Could Have Noticed? Or the Opposite - Being So Fidgety: 2-->more than half the days  Thoughts that You Would be Better Off Dead or of Hurting Yourself in Some Way: 0-->not at all  PHQ-9: Brief Depression Severity Measure Score: 21  If You Checked Off Any Problems, How Difficult Have These Problems Made It For You to Do Your Work, Take Care of Things at Home, or Get Along with Other People?: extremely difficult      PHQ-9 Score:   PHQ-9 Total Score: 21    Depression Screening:  Patient screened positive for depression based on a PHQ-9 score of 21 on 2023. Follow-up recommendations include: Prescribed antidepressant medication treatment and Suicide Risk Assessment performed.      Over the last two weeks, how often have you been bothered by the following problems?  Feeling nervous, anxious or on edge: Nearly every day  Not being able to stop or control worrying: Nearly every day  Worrying too much about different things: Nearly every day  Trouble Relaxing: Nearly every day  Being so restless that it is hard to sit still: More than half the days  Becoming easily annoyed or irritable: More than half the days  Feeling afraid as if something awful might happen: Nearly every day  MARY 7 Total Score: 19  If you checked any problems, how difficult have these problems made it for you to do your work, take care of things at  home, or get along with other people: Extremely difficult        Screening for Adults With ADHD - (1-6)  1. How often do you have trouble wrapping up the final details of a project, once the challenging parts have been done?: Sometimes  2. How often do you have difficulty getting things in order when you have to do a task that requires organization?: Sometimes  3. How often do you have problems remembering appointments or obligations : Very Often  4. When you have a task that requires a lot of thought, how often do you avoid or delay getting started ?: Very Often  5. How often do you fidget or squirm with your hands or feet when you have to sit down for a long time?: Often  6. How often do you feel overly active and compelled to do things, like you were driven by a motor?: Often  7. How often do you make careless mistakes when you have to work on a boring or difficult project?: Sometimes  8. How often do have difficulty keeping your attention when you are doing boring or repetitive work?: Often  9. How often do you have difficulty concentrating on what people say to you, even when they are speaking to you: Often  10.How often do you misplace or have difficulty finding things at home or at work?: Very Often  11.How often are you distracted by activity or noise around you?: Very Often  12.How often do you leave your seat in meetings or other situations in which you are expected to remain seated?: Rarely  13.How often do you feel restless or fidgety?: Sometimes  14.How often do you have difficulty unwinding and relaxing when you have time to yourself?: Sometimes  15.How often do you find yourself talking too much when you are in social situations?: Very Often  16.When you’re in a conversation, how often do you find yourself finishing the sentences of the people you are talking to, before they can finish them themselves?: Often  17.How often do you have difficulty waiting your turn in situations when turn taking is  required?: Sometimes  18.How often do you interrupt others when they are busy?: Sometimes        MEDS ORDERED DURING VISIT:  New Medications Ordered This Visit   Medications   • dexmethylphenidate (FOCALIN) 10 MG tablet     Sig: Take 1 tablet by mouth Daily. Every afternoon     Dispense:  30 tablet     Refill:  0   • buPROPion XL (WELLBUTRIN XL) 150 MG 24 hr tablet     Sig: Take 1 tablet by mouth Every Morning.     Dispense:  30 tablet     Refill:  1   • busPIRone (BUSPAR) 10 MG tablet     Sig: Take 1 tablet by mouth Daily.     Dispense:  30 tablet     Refill:  1   • DULoxetine (Cymbalta) 30 MG capsule     Sig: Take 1 capsule by mouth Daily.     Dispense:  30 capsule     Refill:  1   • Focalin XR 20 MG 24 hr capsule     Sig: Take 1 capsule by mouth Every Morning     Dispense:  30 capsule     Refill:  0         Follow Up   Return in about 8 weeks (around 6/29/2023).  Patient was given instructions and counseling regarding her condition or for health maintenance advice. Please see specific information pulled into the AVS if appropriate.     TREATMENT PLAN/GOALS: Continue supportive psychotherapy efforts and medications as indicated. Treatment and medication options discussed during today's visit. Patient acknowledged and verbally consented to continue with current treatment plan and was educated on the importance of compliance with treatment and follow-up appointments.    MEDICATION ISSUES:  Discussed medication options and treatment plan of prescribed medication as well as the risks, benefits, and side effects including potential falls, possible impaired driving and metabolic adversities among others. Patient is agreeable to call the office with any worsening of symptoms or onset of side effects. Patient is agreeable to call 911 or go to the nearest ER should he/she begin having SI/HI.        VALENTINE Zavala  [facility] [place]  [address] [place]  [address] [address]  [phone]    May 8, 2023 08:29 EDT

## 2023-05-19 ENCOUNTER — OFFICE VISIT (OUTPATIENT)
Dept: INTERNAL MEDICINE | Facility: CLINIC | Age: 23
End: 2023-05-19
Payer: COMMERCIAL

## 2023-05-19 VITALS
BODY MASS INDEX: 45.99 KG/M2 | WEIGHT: 293 LBS | HEIGHT: 67 IN | OXYGEN SATURATION: 97 % | TEMPERATURE: 98.5 F | SYSTOLIC BLOOD PRESSURE: 138 MMHG | DIASTOLIC BLOOD PRESSURE: 84 MMHG | HEART RATE: 111 BPM

## 2023-05-19 DIAGNOSIS — Z30.011 ENCOUNTER FOR INITIAL PRESCRIPTION OF CONTRACEPTIVE PILLS: ICD-10-CM

## 2023-05-19 DIAGNOSIS — M79.7 FIBROMYALGIA: ICD-10-CM

## 2023-05-19 DIAGNOSIS — Z12.4 CERVICAL CANCER SCREENING: ICD-10-CM

## 2023-05-19 DIAGNOSIS — K21.00 GASTROESOPHAGEAL REFLUX DISEASE WITH ESOPHAGITIS WITHOUT HEMORRHAGE: ICD-10-CM

## 2023-05-19 DIAGNOSIS — E78.5 DYSLIPIDEMIA: ICD-10-CM

## 2023-05-19 DIAGNOSIS — Z00.00 ANNUAL PHYSICAL EXAM: Primary | ICD-10-CM

## 2023-05-19 DIAGNOSIS — F43.10 PTSD (POST-TRAUMATIC STRESS DISORDER): ICD-10-CM

## 2023-05-19 DIAGNOSIS — E66.01 CLASS 3 SEVERE OBESITY DUE TO EXCESS CALORIES WITHOUT SERIOUS COMORBIDITY WITH BODY MASS INDEX (BMI) OF 45.0 TO 49.9 IN ADULT: ICD-10-CM

## 2023-05-19 DIAGNOSIS — Z30.09 BIRTH CONTROL COUNSELING: ICD-10-CM

## 2023-05-19 DIAGNOSIS — H65.01 NON-RECURRENT ACUTE SEROUS OTITIS MEDIA OF RIGHT EAR: ICD-10-CM

## 2023-05-19 DIAGNOSIS — F33.1 MODERATE EPISODE OF RECURRENT MAJOR DEPRESSIVE DISORDER: ICD-10-CM

## 2023-05-19 RX ORDER — NORGESTIMATE AND ETHINYL ESTRADIOL 7DAYSX3 28
1 KIT ORAL DAILY
Qty: 28 TABLET | Refills: 12 | Status: SHIPPED | OUTPATIENT
Start: 2023-05-19

## 2023-05-19 RX ORDER — FLUTICASONE PROPIONATE 50 MCG
2 SPRAY, SUSPENSION (ML) NASAL DAILY
Qty: 11.1 ML | Refills: 3 | Status: SHIPPED | OUTPATIENT
Start: 2023-05-19

## 2023-05-19 RX ORDER — PANTOPRAZOLE SODIUM 20 MG/1
20 TABLET, DELAYED RELEASE ORAL DAILY
Qty: 30 TABLET | Refills: 5 | Status: SHIPPED | OUTPATIENT
Start: 2023-05-19

## 2023-05-19 NOTE — PROGRESS NOTES
Subjective   Elayne Hernandez is a 22 y.o. female and is here for a comprehensive physical exam. The patient reports problems - right ear pain.    HPI: here today for annual physical with above complaint.  Right ear pain started 4-5 days ago.   Endorses muffled hearing, pain, frequent throat clearing, ear popping, and PND.    Denies otorrhea, fever, chills, congestion, and decreased hearing.  She is taking loratadine.   She is currently on OCP. She is a non-smoker. Denies migrain with aura, liver disease, and family history of clotting disorder. Needing refill today. Due for pap.   Seeing Graciela with behavioral health for management of mood, feels stable at this time.     Health Habits:  Eye exam within last 2 years? Yes.   Dental exam every 6 months? Yes.   Exercise habits: No structured exercise.   Healthy diet? Typical American diet.     The ASCVD Risk score (Leesa HERRERA, et al., 2019) failed to calculate for the following reasons:    The 2019 ASCVD risk score is only valid for ages 40 to 79    Do you take any herbs or supplements that were not prescribed by a doctor? yes- womens one a day  Are you taking calcium supplements? No  Are you taking aspirin daily? No     History:  LMP: Patient's last menstrual period was 04/28/2023 (exact date).  Menopause: No  Last pap date: Never  Family history of breast or ovarian cancer: no    OB History   No obstetric history on file.      reports being sexually active and has had partner(s) who are male. She reports using the following methods of birth control/protection: Condom, Pill, OCP, and Birth control pill.    The following portions of the patient's history were reviewed and updated as appropriate: She  has a past medical history of ADHD (attention deficit hyperactivity disorder) (03/2022), Allergic (2013), Anemia, Anxiety, Depression, Fibromyalgia, primary, Gall stones, GERD (gastroesophageal reflux disease), History of medical problems (2017), and Migraine.  She  does not have any pertinent problems on file.  She  has a past surgical history that includes Cholecystectomy (06/2018); [address] tooth extraction; Tonsillectomy; Adenoidectomy; and Abdominal surgery (06/2018).  Her family history includes Anxiety disorder in her mother and sister; Arthritis in her mother; Bipolar disorder in her sister; COPD in her father; Depression in her mother and sister; Diabetes in her father and paternal grandfather; Heart attack in her maternal grandfather; Heart disease in her mother; Hyperlipidemia in her father and mother; Hypertension in her father and mother; Lung cancer in her maternal grandfather and maternal uncle; Mental illness in her mother and sister; Obesity in her mother.  She  reports that she has never smoked. She has never used smokeless tobacco. She reports that she does not drink alcohol and does not use drugs.  Current Outpatient Medications   Medication Sig Dispense Refill   • acetaminophen (TYLENOL) 500 MG tablet Take 1 tablet by mouth Every 6 (Six) Hours As Needed for Mild Pain.     • baclofen (LIORESAL) 10 MG tablet Take 1 tablet by mouth 3 (Three) Times a Day As Needed for Muscle Spasms. 90 tablet 1   • buPROPion XL (WELLBUTRIN XL) 150 MG 24 hr tablet Take 1 tablet by mouth Every Morning. 30 tablet 1   • busPIRone (BUSPAR) 10 MG tablet Take 1 tablet by mouth Daily. 30 tablet 1   • DULoxetine (Cymbalta) 30 MG capsule Take 1 capsule by mouth Daily. 30 capsule 1   • EQ Allergy Relief 10 MG tablet Take 1 tablet by mouth once daily (Patient taking differently: loratadine) 90 tablet 1   • Focalin XR 20 MG 24 hr capsule Take 1 capsule by mouth Every Morning 30 capsule 0   • ibuprofen (ADVIL,MOTRIN) 200 MG tablet Take 1 tablet by mouth Every 6 (Six) Hours As Needed for Mild Pain.     • pantoprazole (PROTONIX) 20 MG EC tablet Take 1 tablet by mouth Daily. 30 tablet 1   • propranolol (INDERAL) 10 MG tablet 1-2 po tid prn anxiety 90 tablet 3   • Tri-Sprintec 0.18/0.215/0.25  "MG-35 MCG per tablet Take 1 tablet by mouth once daily 28 tablet 0   • dexmethylphenidate (FOCALIN) 10 MG tablet Take 1 tablet by mouth Daily. Every afternoon (Patient not taking: Reported on 5/19/2023) 30 tablet 0   • fluticasone (FLONASE) 50 MCG/ACT nasal spray 2 sprays into the nostril(s) as directed by provider Daily. 11.1 mL 3     No current facility-administered medications for this visit.       Objective   /84 (BP Location: Left arm, Patient Position: Sitting, Cuff Size: Large Adult)   Pulse 111   Temp 98.5 °F (36.9 °C) (Temporal)   Ht 170.2 cm (67\")   Wt 135 kg (297 lb)   LMP 04/28/2023 (Exact Date)   SpO2 97%   BMI 46.52 kg/m²     Physical Exam  Vitals and nursing note reviewed. Exam conducted with a chaperone present (Diane Cook LPN/ Batsheva Sanon, [employer] student assisting).   Constitutional:       General: She is not in acute distress.     Appearance: Normal appearance. She is obese.   HENT:      Right Ear: Ear canal normal. Tympanic membrane is scarred and bulging.      Left Ear: Tympanic membrane and ear canal normal.      Nose: Nose normal.      Mouth/Throat:      Mouth: Mucous membranes are moist.      Pharynx: Oropharynx is clear. No posterior oropharyngeal erythema.   Eyes:      Extraocular Movements: Extraocular movements intact.      Pupils: Pupils are equal, round, and reactive to light.   Neck:      Thyroid: No thyroid mass or thyromegaly.      Vascular: No carotid bruit.   Cardiovascular:      Rate and Rhythm: Regular rhythm. Tachycardia present.      Pulses: Normal pulses.      Heart sounds: Normal heart sounds. No murmur heard.  Pulmonary:      Effort: Pulmonary effort is normal.      Breath sounds: Normal breath sounds. No wheezing.   Chest:      Chest wall: No mass or tenderness.   Breasts:     Preston Score is 5.      Right: Normal. No bleeding, inverted nipple, nipple discharge or skin change.      Left: Normal. No bleeding, inverted nipple, nipple discharge or skin " change.   Abdominal:      General: Bowel sounds are normal. There is no distension.      Palpations: Abdomen is soft. There is no mass.      Tenderness: There is no abdominal tenderness.   Genitourinary:     Exam position: Lithotomy position.      Pubic Area: No rash.       Preston stage (genital): 5.      Labia:         Right: No rash or lesion.         Left: No rash or lesion.       Vagina: No vaginal discharge, tenderness, bleeding or lesions.      Cervix: Erythema present. No friability, lesion or cervical bleeding.      Uterus: Normal. Not enlarged.       Adnexa: Right adnexa normal and left adnexa normal.        Right: No tenderness.          Left: No tenderness.     Musculoskeletal:         General: No deformity. Normal range of motion.      Cervical back: Normal range of motion and neck supple. No muscular tenderness.   Lymphadenopathy:      Head:      Right side of head: No submandibular, tonsillar, preauricular or posterior auricular adenopathy.      Left side of head: No submandibular, tonsillar, preauricular or posterior auricular adenopathy.      Cervical: Cervical adenopathy (right side) present.      Upper Body:      Right upper body: No supraclavicular, axillary or pectoral adenopathy.      Left upper body: No supraclavicular, axillary or pectoral adenopathy.   Skin:     General: Skin is warm and dry.      Capillary Refill: Capillary refill takes less than 2 seconds.      Findings: No bruising or rash.   Neurological:      General: No focal deficit present.      Mental Status: She is alert and oriented to person, place, and time.      Gait: Gait normal.      Deep Tendon Reflexes: Reflexes normal.   Psychiatric:         Mood and Affect: Mood normal.         Behavior: Behavior normal.       Assessment & Plan   Healthy female exam.    Diagnosis Plan   1. Annual physical exam  Preventative maintenance discussed during visit and information provided in AVS.       2. Dyslipidemia  CBC No Differential     Comprehensive metabolic panel    Lipid panel    Update lipid panel today. Low cholesterol diet.       3. Gastroesophageal reflux disease with esophagitis without hemorrhage  CBC No Differential    Comprehensive metabolic panel    Lipid panel    Avoid eating spicy foods, caffeinated and carbonated beverages, alcohol, and chocolate. Try not to eat or drink within 3 hours of going to bed at night. May elevate the head of the bed. Eat smaller portions.       4. Cervical cancer screening  LIQUID-BASED PAP SMEAR WITH HPV GENOTYPING IF ASCUS ([facility])      5. Class 3 severe obesity due to excess calories without serious comorbidity with body mass index (BMI) of 45.0 to 49.9 in adult  CBC No Differential    Comprehensive metabolic panel    Lipid panel    Class 3 Severe Obesity (BMI >=40). Obesity-related health conditions include the following: GERD. Obesity is unchanged. BMI is is above average; BMI management plan is completed. We discussed portion control and increasing exercise.      6. Fibromyalgia  CBC No Differential    Comprehensive metabolic panel    Lipid panel    Recommend healthy diet, light weight bearing exercise, extra strength tylenol as needed.       7. PTSD (post-traumatic stress disorder)  CBC No Differential    Comprehensive metabolic panel    Lipid panel    Managed by behavioral health, keep follow-up.       8. Moderate episode of recurrent major depressive disorder  CBC No Differential    Comprehensive metabolic panel    Lipid panel    Managed by behavioral health , keep follow-up.       9. Non-recurrent acute serous otitis media of right ear  Recommend fluticasone nasal spray, diver manuevers, OTC coricidin, tylenol as needed.       10. Birth control counseling  Discussed how to take birth control pills, take at the same time each day.  If one dose is missed take ASAP, does not require a back up method. If 2 or more doses are missed please use back up method such as condoms for at least 7 days.    Some medications can decrease the effectiveness of OCP such as certain antibiotics. A backup method such as condoms during treatment is necessary for adequate pregnancy protection.   Discussed risks of OCPs including deep vein thrombosis, pulmonary embolism, heart attack, and stroke.   Discussed with patient birth control does not protect against sexually transmitted diseases.   She will RTC for pregnancy test, unable to urinate.         2. Patient Counseling:  --Nutrition: Stressed importance of moderation in sodium/caffeine intake, saturated fat and cholesterol, caloric balance, sufficient intake of fresh fruits, vegetables, fiber, calcium, iron, and 1 g folate supplementation if of childbearing age.   --Discussed the issue of calcium supplement, and the daily use of baby aspirin if applicable.             --Mammogram recommended every 2 years from age 40-49 and yearly beginning at age 50.  --Exercise: Stressed the importance of regular exercise.   --Substance Abuse: Discussed cessation/primary prevention of tobacco (if applicable), alcohol, or other drug use (if applicable); driving or other dangerous activities under the influence; availability of treatment for abuse.    --Sexuality: Discussed sexually transmitted diseases, partner selection, use of condoms, avoidance of unintended pregnancy  and contraceptive alternatives.   --Injury prevention: Discussed safety belts, safety helmets, smoke detector, smoking near bedding or upholstery.   --Dental health: Discussed importance of regular tooth brushing, flossing, and dental visits every 6 months.  --Immunizations reviewed.  --Discussed benefits of screening colonoscopy (if applicable).  --After hours service discussed with patient  3. Discussed the patient's BMI with her.  The BMI is above average; BMI management plan is completed  4. Return in about 1 year (around 5/19/2024) for Annual.  VALENTINE Sanchez  05/19/2023  13:49 EDT

## 2023-05-24 LAB — REF LAB TEST METHOD: NORMAL

## 2023-06-08 DIAGNOSIS — F90.0 ATTENTION DEFICIT HYPERACTIVITY DISORDER (ADHD), PREDOMINANTLY INATTENTIVE TYPE: ICD-10-CM

## 2023-06-08 RX ORDER — DEXMETHYLPHENIDATE HCL 20 MG
20 CAPSULE,EXTENDED RELEASE BIPHASIC 50-50 ORAL EVERY MORNING
Qty: 30 CAPSULE | Refills: 0 | Status: SHIPPED | OUTPATIENT
Start: 2023-06-08

## 2023-06-08 NOTE — TELEPHONE ENCOUNTER
Incoming Refill Request      [facility]    Pharmacy where [facility] should be sent: [facility]    Additional details provided by patient:     Best call back number: [phone]    Does the patient have less than a 3 day supply:  [x] Yes  [] No    Chas Salazar Rep  06/08/23, 10:55 EDT

## 2023-06-08 NOTE — TELEPHONE ENCOUNTER
Rx Refill Note  Requested Prescriptions     Pending Prescriptions Disp Refills    Focalin XR 20 MG 24 hr capsule 30 capsule 0     Sig: Take 1 capsule by mouth Every Morning      Last office visit with prescribing clinician: 5/4/2023   Last telemedicine visit with prescribing clinician: Visit date not found   Next office visit with prescribing clinician: 7/25/2023                         Would you like a call back once the refill request has been completed: [] Yes [] No    If the office needs to give you a call back, can they leave a voicemail: [] Yes [] No    Nany Narayanan MA  06/08/23, 13:10 EDT

## 2023-07-20 ENCOUNTER — OFFICE VISIT (OUTPATIENT)
Dept: BEHAVIORAL HEALTH | Facility: CLINIC | Age: 23
End: 2023-07-20
Payer: COMMERCIAL

## 2023-07-20 DIAGNOSIS — F41.1 GENERALIZED ANXIETY DISORDER: Primary | ICD-10-CM

## 2023-07-20 PROCEDURE — 90832 PSYTX W PT 30 MINUTES: CPT | Performed by: COUNSELOR
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
## 2023-07-25 ENCOUNTER — OFFICE VISIT (OUTPATIENT)
Dept: BEHAVIORAL HEALTH | Facility: CLINIC | Age: 23
End: 2023-07-25
Payer: COMMERCIAL

## 2023-07-25 VITALS — BODY MASS INDEX: 45.99 KG/M2 | WEIGHT: 293 LBS | HEIGHT: 67 IN

## 2023-07-25 DIAGNOSIS — F90.0 ATTENTION DEFICIT HYPERACTIVITY DISORDER (ADHD), PREDOMINANTLY INATTENTIVE TYPE: Primary | ICD-10-CM

## 2023-07-25 DIAGNOSIS — F41.1 GAD (GENERALIZED ANXIETY DISORDER): ICD-10-CM

## 2023-07-25 DIAGNOSIS — Z51.81 ENCOUNTER FOR THERAPEUTIC DRUG MONITORING: ICD-10-CM

## 2023-07-25 DIAGNOSIS — F33.1 MODERATE EPISODE OF RECURRENT MAJOR DEPRESSIVE DISORDER: ICD-10-CM

## 2023-07-25 DIAGNOSIS — F43.10 PTSD (POST-TRAUMATIC STRESS DISORDER): ICD-10-CM

## 2023-07-25 RX ORDER — BUPROPION HYDROCHLORIDE 150 MG/1
150 TABLET ORAL EVERY MORNING
Qty: 30 TABLET | Refills: 2 | Status: SHIPPED | OUTPATIENT
Start: 2023-07-25

## 2023-07-25 RX ORDER — DEXMETHYLPHENIDATE HYDROCHLORIDE 10 MG/1
10 TABLET ORAL DAILY
Qty: 30 TABLET | Refills: 0 | Status: SHIPPED | OUTPATIENT
Start: 2023-07-25

## 2023-07-25 RX ORDER — DEXMETHYLPHENIDATE HCL 20 MG
20 CAPSULE,EXTENDED RELEASE BIPHASIC 50-50 ORAL EVERY MORNING
Qty: 30 CAPSULE | Refills: 0 | Status: SHIPPED | OUTPATIENT
Start: 2023-07-25

## 2023-07-25 RX ORDER — DULOXETIN HYDROCHLORIDE 30 MG/1
30 CAPSULE, DELAYED RELEASE ORAL DAILY
Qty: 30 CAPSULE | Refills: 2 | Status: SHIPPED | OUTPATIENT
Start: 2023-07-25

## 2023-07-25 RX ORDER — BUSPIRONE HYDROCHLORIDE 10 MG/1
10 TABLET ORAL DAILY
Qty: 30 TABLET | Refills: 2 | Status: SHIPPED | OUTPATIENT
Start: 2023-07-25

## 2023-07-25 NOTE — PROGRESS NOTES
Follow Up Office Visit    Patient Name: Elayne Hernandez  : 2000   MRN: [id number]   Care Team: Patient Care Team:  Noy Manzano APRN as PCP - General (Family Medicine)  Melissa Nogueira [facility] as Counselor (Behavioral Health)  Karma Laura APRN as Nurse Practitioner (Behavioral Health)         Chief Complaint:    Chief Complaint   Patient presents with    ADHD    Anxiety    Depression    PTSD    Med Management       History of Present Illness: Elayne Hernandez is a 22 y.o. female who is here today for a medication management follow up. Patient reports that overall medication continues to be effective. She states the combination of Wellbutrin, Cymbalta and Buspar has really helped with her anxiety and depression. She states the Focalin continues to help with her focus and task completion. She has had a hard time getting the Focalin at times due to availability, but other than that she feels that everything is working well. She did not see the need to make any changes and did not have any medication concerns at today's visit.     The following portion of the patient's history were reviewed and updated appropriately: allergies, current and past medications, family history, medical history and social history.  Subjective   Review of Systems:    Review of Systems   All other systems reviewed and are negative.    Current Medications:   Current Outpatient Medications   Medication Sig Dispense Refill    acetaminophen (TYLENOL) 500 MG tablet Take 1 tablet by mouth Every 6 (Six) Hours As Needed for Mild Pain.      baclofen (LIORESAL) 10 MG tablet Take 1 tablet by mouth 3 (Three) Times a Day As Needed for Muscle Spasms. 90 tablet 1    buPROPion XL (WELLBUTRIN XL) 150 MG 24 hr tablet Take 1 tablet by mouth Every Morning. 30 tablet 2    busPIRone (BUSPAR) 10 MG tablet Take 1 tablet by mouth Daily. 30 tablet 2    dexmethylphenidate (FOCALIN) 10 MG tablet Take 1 tablet by mouth Daily. Every afternoon 30  "tablet 0    DULoxetine (Cymbalta) 30 MG capsule Take 1 capsule by mouth Daily. 30 capsule 2    EQ Allergy Relief 10 MG tablet Take 1 tablet by mouth once daily (Patient taking differently: loratadine) 90 tablet 1    fluticasone (FLONASE) 50 MCG/ACT nasal spray 2 sprays into the nostril(s) as directed by provider Daily. 11.1 mL 3    Focalin XR 20 MG 24 hr capsule Take 1 capsule by mouth Every Morning 30 capsule 0    ibuprofen (ADVIL,MOTRIN) 200 MG tablet Take 1 tablet by mouth Every 6 (Six) Hours As Needed for Mild Pain.      norgestimate-ethinyl estradiol (Tri-Sprintec) 0.18/0.215/0.25 MG-35 MCG per tablet Take 1 tablet by mouth Daily. 28 tablet 12    pantoprazole (PROTONIX) 20 MG EC tablet Take 1 tablet by mouth Daily. 30 tablet 5    propranolol (INDERAL) 10 MG tablet 1-2 po tid prn anxiety 90 tablet 3     No current facility-administered medications for this visit.       Mental Status Exam:   Hygiene:   good  Cooperation:  Cooperative  Eye Contact:  Good  Psychomotor Behavior:  Appropriate  Affect:  Appropriate  Mood: normal  Speech:  Normal  Thought Process:  Goal directed and Linear  Thought Content:  Normal and Mood congruent  Suicidal:  None  Homicidal:  None  Hallucinations:  None  Delusion:  None  Memory:  Intact  Orientation:  Person, Place, Time, and Situation  Reliability:  good  Insight:  Good  Judgement:  Good  Impulse Control:  Good  Physical/Medical Issues:  No      Objective   Vital Signs:   Ht 170.2 cm (67\")   Wt (!) 138 kg (304 lb)   BMI 47.61 kg/m²       Assessment / Plan    Diagnoses and all orders for this visit:    1. Attention deficit hyperactivity disorder (ADHD), predominantly inattentive type (Primary)  -     Focalin XR 20 MG 24 hr capsule; Take 1 capsule by mouth Every Morning  Dispense: 30 capsule; Refill: 0  -     dexmethylphenidate (FOCALIN) 10 MG tablet; Take 1 tablet by mouth Daily. Every afternoon  Dispense: 30 tablet; Refill: 0    2. PTSD (post-traumatic stress disorder)  -     " buPROPion XL (WELLBUTRIN XL) 150 MG 24 hr tablet; Take 1 tablet by mouth Every Morning.  Dispense: 30 tablet; Refill: 2    3. Moderate episode of recurrent major depressive disorder  -     buPROPion XL (WELLBUTRIN XL) 150 MG 24 hr tablet; Take 1 tablet by mouth Every Morning.  Dispense: 30 tablet; Refill: 2  -     busPIRone (BUSPAR) 10 MG tablet; Take 1 tablet by mouth Daily.  Dispense: 30 tablet; Refill: 2  -     DULoxetine (Cymbalta) 30 MG capsule; Take 1 capsule by mouth Daily.  Dispense: 30 capsule; Refill: 2    4. MARY (generalized anxiety disorder)  -     busPIRone (BUSPAR) 10 MG tablet; Take 1 tablet by mouth Daily.  Dispense: 30 tablet; Refill: 2  -     DULoxetine (Cymbalta) 30 MG capsule; Take 1 capsule by mouth Daily.  Dispense: 30 capsule; Refill: 2    5. Encounter for therapeutic drug monitoring  -     Compliance Drug Analysis, Ur - Urine, Clean Catch       Patient appears to be doing well on current medication. No issues reported and no indication for change. Will continue medication as ordered. Obtained yearly urine drug screen and controlled substance agreement signed.     A psychological evaluation was conducted in order to assess past and current level of functioning. Areas assessed included, but were not limited to: perception of social support, perception of ability to face and deal with challenges in life (positive functioning), anxiety symptoms, depressive symptoms, perspective on beliefs/belief system, coping skills for stress, intelligence level,  Therapeutic rapport was established. Interventions conducted today were geared towards incorporating medication management along with support for continued therapy. Education was also provided as to the med management with this provider and what to expect in subsequent sessions.      We discussed risks, benefits, goals and side effects of the above medication and the patient was agreeable with the plan. Patient was educated on the importance of  compliance with treatment and follow-up appointments. Patient is aware to contact the [facility] with any worsening of symptoms. To call for questions or concerns and return early if necessary. Patent is agreeable to go to the Emergency Department or call 911 should they begin SI/HI.      PHQ-2/PHQ-9: Depression Screening  Little Interest or Pleasure in Doing Things: 1-->several days  Feeling Down, Depressed or Hopeless: 1-->several days  PHQ-2 Total Score: 2  Trouble Falling or Staying Asleep, or Sleeping Too Much: 1-->several days  Feeling Tired or Having Little Energy: 2-->more than half the days  Poor Appetite or Overeatin-->several days  Feeling Bad about Yourself - or that You are a Failure or Have Let Yourself or Your Family Down: 2-->more than half the days  Trouble Concentrating on Things, Such as Reading the Newspaper or Watching Television: 2-->more than half the days  Moving or Speaking So Slowly that Other People Could Have Noticed? Or the Opposite - Being So Fidgety: 1-->several days  Thoughts that You Would be Better Off Dead or of Hurting Yourself in Some Way: 0-->not at all  PHQ-9: Brief Depression Severity Measure Score: 11  If You Checked Off Any Problems, How Difficult Have These Problems Made It For You to Do Your Work, Take Care of Things at Home, or Get Along with Other People?: somewhat difficult      PHQ-9 Score:   PHQ-9 Total Score: 11    Depression Screening:  Patient screened positive for depression based on a PHQ-9 score of 11 on 2023. Follow-up recommendations include: Prescribed antidepressant medication treatment and Suicide Risk Assessment performed.      Over the last two weeks, how often have you been bothered by the following problems?  Feeling nervous, anxious or on edge: Nearly every day  Not being able to stop or control worrying: Nearly every day  Worrying too much about different things: Nearly every day  Trouble Relaxing: More than half the days  Being so  restless that it is hard to sit still: More than half the days  Becoming easily annoyed or irritable: More than half the days  Feeling afraid as if something awful might happen: Nearly every day  MARY 7 Total Score: 18  If you checked any problems, how difficult have these problems made it for you to do your work, take care of things at home, or get along with other people: Very difficult        Screening for Adults With ADHD - (1-6)  1. How often do you have trouble wrapping up the final details of a project, once the challenging parts have been done?: Sometimes  2. How often do you have difficulty getting things in order when you have to do a task that requires organization?: Sometimes  3. How often do you have problems remembering appointments or obligations : Sometimes  4. When you have a task that requires a lot of thought, how often do you avoid or delay getting started ?: Often  5. How often do you fidget or squirm with your hands or feet when you have to sit down for a long time?: Often  6. How often do you feel overly active and compelled to do things, like you were driven by a motor?: Sometimes  7. How often do you make careless mistakes when you have to work on a boring or difficult project?: Sometimes  8. How often do have difficulty keeping your attention when you are doing boring or repetitive work?: Often  9. How often do you have difficulty concentrating on what people say to you, even when they are speaking to you: Often  10.How often do you misplace or have difficulty finding things at home or at work?: Sometimes  11.How often are you distracted by activity or noise around you?: Very Often  12.How often do you leave your seat in meetings or other situations in which you are expected to remain seated?: Rarely  13.How often do you feel restless or fidgety?: Sometimes  14.How often do you have difficulty unwinding and relaxing when you have time to yourself?: Sometimes  15.How often do you find  yourself talking too much when you are in social situations?: Very Often  16.When you’re in a conversation, how often do you find yourself finishing the sentences of the people you are talking to, before they can finish them themselves?: Very Often  17.How often do you have difficulty waiting your turn in situations when turn taking is required?: Sometimes  18.How often do you interrupt others when they are busy?: Sometimes        MEDS ORDERED DURING VISIT:  New Medications Ordered This Visit   Medications    buPROPion XL (WELLBUTRIN XL) 150 MG 24 hr tablet     Sig: Take 1 tablet by mouth Every Morning.     Dispense:  30 tablet     Refill:  2    busPIRone (BUSPAR) 10 MG tablet     Sig: Take 1 tablet by mouth Daily.     Dispense:  30 tablet     Refill:  2    Focalin XR 20 MG 24 hr capsule     Sig: Take 1 capsule by mouth Every Morning     Dispense:  30 capsule     Refill:  0    dexmethylphenidate (FOCALIN) 10 MG tablet     Sig: Take 1 tablet by mouth Daily. Every afternoon     Dispense:  30 tablet     Refill:  0    DULoxetine (Cymbalta) 30 MG capsule     Sig: Take 1 capsule by mouth Daily.     Dispense:  30 capsule     Refill:  2         Follow Up   Return in about 3 months (around 10/25/2023).  Patient was given instructions and counseling regarding her condition or for health maintenance advice. Please see specific information pulled into the AVS if appropriate.     TREATMENT PLAN/GOALS: Continue supportive psychotherapy efforts and medications as indicated. Treatment and medication options discussed during today's visit. Patient acknowledged and verbally consented to continue with current treatment plan and was educated on the importance of compliance with treatment and follow-up appointments.    MEDICATION ISSUES:  Discussed medication options and treatment plan of prescribed medication as well as the risks, benefits, and side effects including potential falls, possible impaired driving and metabolic adversities  among others. Patient is agreeable to call the office with any worsening of symptoms or onset of side effects. Patient is agreeable to call 911 or go to the nearest ER should he/she begin having SI/HI.        VALENTINE Zav[facility]  [address] [place] [id number]  [phone]    July 25, 2023 16:07 EDT

## 2023-08-01 LAB — DRUGS UR: NORMAL

## 2023-10-04 DIAGNOSIS — E78.5 DYSLIPIDEMIA: ICD-10-CM

## 2023-10-04 RX ORDER — PANTOPRAZOLE SODIUM 20 MG/1
TABLET, DELAYED RELEASE ORAL
Qty: 90 TABLET | Refills: 0 | Status: SHIPPED | OUTPATIENT
Start: 2023-10-04

## 2023-12-03 DIAGNOSIS — F33.1 MODERATE EPISODE OF RECURRENT MAJOR DEPRESSIVE DISORDER: ICD-10-CM

## 2023-12-03 DIAGNOSIS — F43.10 PTSD (POST-TRAUMATIC STRESS DISORDER): ICD-10-CM

## 2023-12-03 DIAGNOSIS — F41.1 GAD (GENERALIZED ANXIETY DISORDER): ICD-10-CM

## 2023-12-04 RX ORDER — BUPROPION HYDROCHLORIDE 150 MG/1
150 TABLET ORAL EVERY MORNING
Qty: 90 TABLET | Refills: 0 | OUTPATIENT
Start: 2023-12-04

## 2023-12-04 RX ORDER — DULOXETIN HYDROCHLORIDE 30 MG/1
30 CAPSULE, DELAYED RELEASE ORAL DAILY
Qty: 90 CAPSULE | Refills: 0 | OUTPATIENT
Start: 2023-12-04

## 2023-12-04 RX ORDER — BUSPIRONE HYDROCHLORIDE 10 MG/1
10 TABLET ORAL DAILY
Qty: 90 TABLET | Refills: 0 | OUTPATIENT
Start: 2023-12-04

## 2023-12-05 DIAGNOSIS — F33.1 MODERATE EPISODE OF RECURRENT MAJOR DEPRESSIVE DISORDER: ICD-10-CM

## 2023-12-05 DIAGNOSIS — F43.10 PTSD (POST-TRAUMATIC STRESS DISORDER): ICD-10-CM

## 2023-12-05 DIAGNOSIS — F41.1 GAD (GENERALIZED ANXIETY DISORDER): ICD-10-CM

## 2023-12-05 DIAGNOSIS — F90.0 ATTENTION DEFICIT HYPERACTIVITY DISORDER (ADHD), PREDOMINANTLY INATTENTIVE TYPE: ICD-10-CM

## 2023-12-05 RX ORDER — DEXMETHYLPHENIDATE HYDROCHLORIDE 10 MG/1
10 TABLET ORAL DAILY
Qty: 30 TABLET | Refills: 0 | Status: SHIPPED | OUTPATIENT
Start: 2023-12-05

## 2023-12-05 RX ORDER — BUPROPION HYDROCHLORIDE 150 MG/1
150 TABLET ORAL EVERY MORNING
Qty: 30 TABLET | Refills: 0 | Status: SHIPPED | OUTPATIENT
Start: 2023-12-05

## 2023-12-05 RX ORDER — BUSPIRONE HYDROCHLORIDE 10 MG/1
10 TABLET ORAL DAILY
Qty: 30 TABLET | Refills: 2 | Status: SHIPPED | OUTPATIENT
Start: 2023-12-05

## 2023-12-05 RX ORDER — LORATADINE 10 MG/1
TABLET ORAL
Qty: 90 TABLET | Refills: 0 | Status: SHIPPED | OUTPATIENT
Start: 2023-12-05

## 2023-12-05 RX ORDER — DULOXETIN HYDROCHLORIDE 30 MG/1
30 CAPSULE, DELAYED RELEASE ORAL DAILY
Qty: 30 CAPSULE | Refills: 0 | Status: SHIPPED | OUTPATIENT
Start: 2023-12-05

## 2023-12-05 NOTE — TELEPHONE ENCOUNTER
"\" Patient Comment: I will make appointments as soon as I can\"      Rx Refill Note  Requested Prescriptions     Pending Prescriptions Disp Refills    buPROPion XL (WELLBUTRIN XL) 150 MG 24 hr tablet 30 tablet 2     Sig: Take 1 tablet by mouth Every Morning.    busPIRone (BUSPAR) 10 MG tablet 30 tablet 2     Sig: Take 1 tablet by mouth Daily.    dexmethylphenidate (FOCALIN) 10 MG tablet 30 tablet 0     Sig: Take 1 tablet by mouth Daily. Every afternoon    DULoxetine (Cymbalta) 30 MG capsule 30 capsule 2     Sig: Take 1 capsule by mouth Daily.      Last office visit with prescribing clinician: 7/25/2023   Last telemedicine visit with prescribing clinician: Visit date not found   Next office visit with prescribing clinician: Visit date not found                         Would you like a call back once the refill request has been completed: [] Yes [] No    If the office needs to give you a call back, can they leave a voicemail: [] Yes [] No    Nany Narayanan MA  12/05/23, 09:45 EST    "

## 2023-12-07 DIAGNOSIS — Z30.011 ENCOUNTER FOR INITIAL PRESCRIPTION OF CONTRACEPTIVE PILLS: ICD-10-CM

## 2023-12-07 RX ORDER — NORGESTIMATE AND ETHINYL ESTRADIOL 7DAYSX3 28
1 KIT ORAL DAILY
Qty: 28 TABLET | Refills: 0 | Status: SHIPPED | OUTPATIENT
Start: 2023-12-07

## 2024-01-18 ENCOUNTER — TELEMEDICINE (OUTPATIENT)
Dept: BEHAVIORAL HEALTH | Facility: CLINIC | Age: 24
End: 2024-01-18
Payer: COMMERCIAL

## 2024-01-18 DIAGNOSIS — F33.0 MILD EPISODE OF RECURRENT MAJOR DEPRESSIVE DISORDER: Primary | ICD-10-CM

## 2024-01-18 DIAGNOSIS — F43.9 TRAUMA AND STRESSOR-RELATED DISORDER: ICD-10-CM

## 2024-01-18 PROCEDURE — 90834 PSYTX W PT 45 MINUTES: CPT | Performed by: COUNSELOR

## 2024-01-25 ENCOUNTER — OFFICE VISIT (OUTPATIENT)
Dept: BEHAVIORAL HEALTH | Facility: CLINIC | Age: 24
End: 2024-01-25
Payer: COMMERCIAL

## 2024-01-25 VITALS — WEIGHT: 293 LBS | HEIGHT: 67 IN | BODY MASS INDEX: 45.99 KG/M2

## 2024-01-25 DIAGNOSIS — F43.10 PTSD (POST-TRAUMATIC STRESS DISORDER): ICD-10-CM

## 2024-01-25 DIAGNOSIS — F33.1 MODERATE EPISODE OF RECURRENT MAJOR DEPRESSIVE DISORDER: ICD-10-CM

## 2024-01-25 DIAGNOSIS — F41.1 GAD (GENERALIZED ANXIETY DISORDER): ICD-10-CM

## 2024-01-25 DIAGNOSIS — E78.5 DYSLIPIDEMIA: ICD-10-CM

## 2024-01-25 RX ORDER — PROPRANOLOL HYDROCHLORIDE 10 MG/1
10 TABLET ORAL 3 TIMES DAILY
Qty: 90 TABLET | Refills: 1 | Status: SHIPPED | OUTPATIENT
Start: 2024-01-25

## 2024-01-25 RX ORDER — BUPROPION HYDROCHLORIDE 150 MG/1
150 TABLET ORAL EVERY MORNING
Qty: 30 TABLET | Refills: 0 | Status: SHIPPED | OUTPATIENT
Start: 2024-01-25

## 2024-01-25 RX ORDER — BUSPIRONE HYDROCHLORIDE 10 MG/1
10 TABLET ORAL DAILY
Qty: 30 TABLET | Refills: 2 | Status: SHIPPED | OUTPATIENT
Start: 2024-01-25

## 2024-01-25 RX ORDER — DESVENLAFAXINE SUCCINATE 50 MG/1
50 TABLET, EXTENDED RELEASE ORAL DAILY
Qty: 30 TABLET | Refills: 2 | Status: SHIPPED | OUTPATIENT
Start: 2024-01-25

## 2024-01-25 NOTE — PATIENT INSTRUCTIONS
Bilateral Stimulation Music    Ok to stop Buspar if needed  Discontinue Cymbalta; start Pristiq  Keep taking Wellbutrin and Propranolol as previously prescribed

## 2024-01-25 NOTE — PROGRESS NOTES
"               Follow Up Office Visit      Date: 2024   Patient Name: Elayne Hernandez  : 2000   MRN: [id number]     Referring Provider: Noy Manzano APRN    Chief Complaint:      ICD-10-CM ICD-9-CM   1. PTSD (post-traumatic stress disorder)  F43.10 309.81   2. Moderate episode of recurrent major depressive disorder  F33.1 296.32   3. MARY (generalized anxiety disorder)  F41.1 300.02        History of Present Illness:   Elayne Hernandez is a 23 y.o. female who is here today for follow up with medication management.  She has been diagnosed with depression, anxiety, and PTSD, with comorbid fibromyalgia.  She has been on Cymbalta since , and has been on her current dose for over a year; Wellbutrin and buspirone have been added to help with anxiety symptoms, but she states that she is not sure it is helpful.  She had been taking Focalin for her ADHD, but reported increased heart rate and \"just not feeling right,\" so she stopped it herself and does not wish to continue it.  She has been prescribed propranolol in the past to help with physiological symptoms of anxiety, but has been out for quite some time. She has a difficult time sleeping, frequently feels down and depressed, and has low self-esteem.  She frequently has a difficult time relaxing and says that her anxiety is problematic.  She denies SI/HI/psychotic/manic symptoms.    Previous Medication Trials: Zoloft    Subjective      Review of Systems:   Review of Systems   Psychiatric/Behavioral:  Positive for decreased concentration, dysphoric mood and sleep disturbance. The patient is nervous/anxious.        Screening Scores:   PHQ-9 : 16  MARY-7 : 18    Medications:     Current Outpatient Medications:     acetaminophen (TYLENOL) 500 MG tablet, Take 1 tablet by mouth Every 6 (Six) Hours As Needed for Mild Pain., Disp: , Rfl:     baclofen (LIORESAL) 10 MG tablet, Take 1 tablet by mouth 3 (Three) Times a Day As Needed for Muscle Spasms., Disp: 90 " "tablet, Rfl: 1    buPROPion XL (WELLBUTRIN XL) 150 MG 24 hr tablet, Take 1 tablet by mouth Every Morning., Disp: 30 tablet, Rfl: 0    busPIRone (BUSPAR) 10 MG tablet, Take 1 tablet by mouth Daily., Disp: 30 tablet, Rfl: 2    ibuprofen (ADVIL,MOTRIN) 200 MG tablet, Take 1 tablet by mouth Every 6 (Six) Hours As Needed for Mild Pain., Disp: , Rfl:     loratadine (EQ Allergy Relief) 10 MG tablet, Take 1 tablet by mouth once daily, Disp: 90 tablet, Rfl: 0    pantoprazole (PROTONIX) 20 MG EC tablet, Take 1 tablet by mouth once daily, Disp: 90 tablet, Rfl: 0    desvenlafaxine (PRISTIQ) 50 MG 24 hr tablet, Take 1 tablet by mouth Daily., Disp: 30 tablet, Rfl: 2    fluticasone (FLONASE) 50 MCG/ACT nasal spray, 2 sprays into the nostril(s) as directed by provider Daily., Disp: 11.1 mL, Rfl: 3    propranolol (INDERAL) 10 MG tablet, Take 1 tablet by mouth 3 (Three) Times a Day., Disp: 90 tablet, Rfl: 1    Tri-Sprintec 0.18/0.215/0.25 MG-35 MCG per tablet, Take 1 tablet by mouth once daily (Patient not taking: Reported on 1/25/2024), Disp: 28 tablet, Rfl: 0    Medication Considerations:  VAMSHI reviewed and appropriate.     Allergies:   Allergies   Allergen Reactions    Augmentin [Amoxicillin-Pot Clavulanate] Rash    Levofloxacin Rash       Results Reviewed:   N/a     Objective     Vital Signs:   Vitals:    01/25/24 1604   Weight: 134 kg (296 lb 6.4 oz)   Height: 170.2 cm (67\")     Body mass index is 46.42 kg/m².     Mental Status Exam:   MENTAL STATUS EXAM   General Appearance:  Cleanly groomed and dressed  Eye Contact:  Good eye contact  Attitude:  Cooperative  Motor Activity:  Normal gait, posture, restless and foot tapping  Muscle Strength:  Normal  Speech:  Normal rate, tone, volume  Language:  Spontaneous  Mood and affect:  Normal, pleasant  Hopelessness:  2  Thought Process:  Logical and goal-directed  Associations/ Thought Content:  No delusions  Hallucinations:  None  Suicidal Ideations:  Not present  Homicidal Ideation: "  Not present  Sensorium:  Alert and clear  Orientation:  Person, place, time and situation  Immediate Recall, Recent, and Remote Memory:  Intact  Attention Span/ Concentration:  Easily distracted  Fund of Knowledge:  Appropriate for age and educational level  Intellectual Functioning:  Average range  Insight:  Good  Judgement:  Good  Reliability:  Good  Impulse Control:  Fair        SUICIDE RISK ASSESSMENT/CSSRS:  1. Does patient have thoughts of suicide? no  2. Does patient have intent for suicide? no  3. Does patient have a current plan for suicide? no  4. History of suicide attempts: no  5. Family history of suicide or attempts: no  6. History of violent behaviors towards others or property or thoughts of committing suicide: no  7. History of sexual aggression toward others: no  8. Access to firearms or weapons: no    Assessment / Plan      Visit Diagnosis/Orders Placed This Visit:  Diagnoses and all orders for this visit:    1. PTSD (post-traumatic stress disorder)  -     buPROPion XL (WELLBUTRIN XL) 150 MG 24 hr tablet; Take 1 tablet by mouth Every Morning.  Dispense: 30 tablet; Refill: 0  -     propranolol (INDERAL) 10 MG tablet; Take 1 tablet by mouth 3 (Three) Times a Day.  Dispense: 90 tablet; Refill: 1    2. Moderate episode of recurrent major depressive disorder  -     buPROPion XL (WELLBUTRIN XL) 150 MG 24 hr tablet; Take 1 tablet by mouth Every Morning.  Dispense: 30 tablet; Refill: 0  -     busPIRone (BUSPAR) 10 MG tablet; Take 1 tablet by mouth Daily.  Dispense: 30 tablet; Refill: 2  -     desvenlafaxine (PRISTIQ) 50 MG 24 hr tablet; Take 1 tablet by mouth Daily.  Dispense: 30 tablet; Refill: 2    3. MARY (generalized anxiety disorder)  -     busPIRone (BUSPAR) 10 MG tablet; Take 1 tablet by mouth Daily.  Dispense: 30 tablet; Refill: 2  -     desvenlafaxine (PRISTIQ) 50 MG 24 hr tablet; Take 1 tablet by mouth Daily.  Dispense: 30 tablet; Refill: 2         Labs Reviewed: n/a  UDS Reviewed: n/a  Chart  since last visit reviewed: yes    Functional Status: Mild impairment     Prognosis: Good with Ongoing Treatment     Impression/Formulation:  Patient appeared alert and oriented.  Patient is voluntarily continuing psychiatric care at [facility].   Patient is receptive to assistance with maintaining a stable lifestyle.  Patient presents with history of     ICD-10-CM ICD-9-CM   1. PTSD (post-traumatic stress disorder)  F43.10 309.81   2. Moderate episode of recurrent major depressive disorder  F33.1 296.32   3. MARY (generalized anxiety disorder)  F41.1 300.02   .     Treatment Plan:   Patient given the choice between increasing Cymbalta dose or transitioning to Pristiq, which is indicated for PTSD as well as depression and fibromyalgia.  She opted to try Pristiq, 50 mg daily.  Indications/risk/benefits/side effects discussed and consent obtained.  Will discontinue Cymbalta.  Keep Wellbutrin and propranolol as previously prescribed, and patient can taper herself off the buspirone as she is able.  Will follow-up in 4 weeks.  Also discussed book recommendations and nonpharmacologic interventions for anxiety: Patient will locate her [email or web address] results at home and bring them in at next visit.    Patient will continue supportive psychotherapy efforts and medications as indicated. Clinic will obtain release of information for current treatment team for continuity of care as needed. Patient will contact this office, call 911 or present to the nearest emergency room should suicidal or homicidal ideations occur.  Discussed medication options and treatment plan of prescribed medication(s) as well as the risks, benefits, and potential side effects. Patient ackowledged and verbally consented to continue with current treatment plan and was educated on the importance of compliance with treatment and follow-up appointments.     Benzo: n/a  Stimulants: n/a     Quality Measures:  Tobacco: n/a; patient does not use  tobacco products  Depression (PHQ >11): addressed this visit with medication management and supportive care    Follow Up:   Return in about 4 weeks (around 2/22/2024) for Medication Management, Video visit.    VALENTINE Beth  [facility]    This is electronically signed by VALENTINE Beth  01/25/2024 16:09 EST

## 2024-01-26 RX ORDER — PANTOPRAZOLE SODIUM 20 MG/1
TABLET, DELAYED RELEASE ORAL
Qty: 90 TABLET | Refills: 1 | Status: SHIPPED | OUTPATIENT
Start: 2024-01-26

## 2024-02-06 NOTE — PROGRESS NOTES
Telehealth Video Therapy Visit      Date: 2024     Patient Name: Elayne Hernandez  : 2000   Time In: 5:20  Time Out: 6:00    Disclosure: You have chosen to receive care through a video visit today. Do you consent to use the phone and/or a video visit for your behavioral health today? Yes          Chief Complaint:      ICD-10-CM ICD-9-CM   1. Mild episode of recurrent major depressive disorder  F33.0 296.31   2. Trauma and stressor-related disorder  F43.9 309.81     308.9         History of Present Illness: Elayne Hernandez is a 23 y.o. female that has agreed to be seen via a telehealth visit today. Elayne Hernandez has stated that they are in a secure environment for this session. The provider is located at [facility], [address] [place] [address], [address], [place]   The patient is seen remotely at her  home, using Epic Video Visit (HIPAA compliant).  The patient had not been seen in a while due to not having health insurance, but the patient states that she has decent health insurance now.  The patient is still in school because she failed a few classes, but is still getting a degree in livestock management.  The patient plans to graduate in the spring semester for the .  The patient discussed about being worried regarding the health of her sister, and her father who went to the hospital because he was urinating blood.  The patient's sister was in the heart failure due to being pregnant now she is in the hospital for gallstones and her gallbladder being taken out.  Discussed with the patient the importance of getting a second opinion if she does not feel confident that they are making the right call.  The patient is appropriate for a telemedicine visit. I identified myself Melissa Nogueira [facility]  along with my credentials of [facility].@ can refuse to be seen remotely at any time. The electronic data is encrypted and password protected, and the patient has been advised of the potential  risks to privacy, not withstanding such measures.    Subjective      Review of Systems:   The following portions of the patient's history were reviewed and updated as appropriate: allergies, current medications, past family history, past medical history, past social history, past surgical history and problem list.    Review of Systems    Past Medical History:   Past Medical History:   Diagnosis Date    ADHD (attention deficit hyperactivity disorder) 03/2022    Allergic 2013    Anemia     iron deficiency     Anxiety     Depression     Fibromyalgia, primary     Gall stones     GERD (gastroesophageal reflux disease)     History of medical problems 2017    Hiatal hernia    Migraine        Past Surgical History:   Past Surgical History:   Procedure Laterality Date    ABDOMINAL SURGERY  06/2018    Cholecystectomy    ADENOIDECTOMY      CHOLECYSTECTOMY  06/2018    TONSILLECTOMY      WISDOM TOOTH EXTRACTION         Family History:   Family History   Problem Relation Age of Onset    Arthritis Mother     Hypertension Mother     Hyperlipidemia Mother     Mental illness Mother     Obesity Mother     Anxiety disorder Mother     Depression Mother     Heart disease Mother         CHF    Diabetes Father     Hypertension Father     Hyperlipidemia Father     COPD Father     Anxiety disorder Sister     Depression Sister     Mental illness Sister     Bipolar disorder Sister     Lung cancer Maternal Grandfather     Heart attack Maternal Grandfather     Diabetes Paternal Grandfather     Lung cancer Maternal Uncle        Social History:   Social History     Socioeconomic History    Marital status:    Tobacco Use    Smoking status: Never    Smokeless tobacco: Never    Tobacco comments:     No tobacco, occasional vaping   Vaping Use    Vaping Use: Some days    Substances: Nicotine    Devices: Disposable   Substance and Sexual Activity    Alcohol use: Never    Drug use: Never    Sexual activity: Yes     Partners: Male     Birth  control/protection: Condom, Pill, OCP, Birth control pill       Medications:     Current Outpatient Medications:     acetaminophen (TYLENOL) 500 MG tablet, Take 1 tablet by mouth Every 6 (Six) Hours As Needed for Mild Pain., Disp: , Rfl:     baclofen (LIORESAL) 10 MG tablet, Take 1 tablet by mouth 3 (Three) Times a Day As Needed for Muscle Spasms., Disp: 90 tablet, Rfl: 1    buPROPion XL (WELLBUTRIN XL) 150 MG 24 hr tablet, Take 1 tablet by mouth Every Morning., Disp: 30 tablet, Rfl: 0    busPIRone (BUSPAR) 10 MG tablet, Take 1 tablet by mouth Daily., Disp: 30 tablet, Rfl: 2    desvenlafaxine (PRISTIQ) 50 MG 24 hr tablet, Take 1 tablet by mouth Daily., Disp: 30 tablet, Rfl: 2    ibuprofen (ADVIL,MOTRIN) 200 MG tablet, Take 1 tablet by mouth Every 6 (Six) Hours As Needed for Mild Pain., Disp: , Rfl:     loratadine (EQ Allergy Relief) 10 MG tablet, Take 1 tablet by mouth once daily, Disp: 90 tablet, Rfl: 0    pantoprazole (PROTONIX) 20 MG EC tablet, Take 1 tablet by mouth once daily, Disp: 90 tablet, Rfl: 1    propranolol (INDERAL) 10 MG tablet, Take 1 tablet by mouth 3 (Three) Times a Day., Disp: 90 tablet, Rfl: 1    Tri-Sprintec 0.18/0.215/0.25 MG-35 MCG per tablet, Take 1 tablet by mouth once daily (Patient not taking: Reported on 1/25/2024), Disp: 28 tablet, Rfl: 0    Allergies:   Allergies   Allergen Reactions    Augmentin [Amoxicillin-Pot Clavulanate] Rash    Levofloxacin Rash       PHQ-9 Score:   PHQ-9 Total Score: unknown     Objective     Physical Exam:   not currently breastfeeding. There is no height or weight on file to calculate BMI.     Physical Exam    Patient's Support Network Includes:   and mother    Prognosis: Good with Ongoing Treatment     Mental Status Exam:   Hygiene:   good  Cooperation:  Cooperative  Eye Contact:  Good  Psychomotor Behavior:  Appropriate  Affect:  appropriate  Mood: normal  Hopelessness: Denies  Speech:  Normal  Thought Process:  Goal directed  Thought Content:   Normal  Suicidal:  None  Homicidal:  None  Hallucinations:  None  Delusion:  None  Memory:  Intact  Orientation:  Person, place, time, and situation  Reliability:  good  Insight:  Good  Judgement:  Good  Impulse Control:  Good  Physical/Medical Issues:  No      Assessment / Plan      Assessment/Plan:   Diagnoses and all orders for this visit:    1. Mild episode of recurrent major depressive disorder (Primary)    2. Trauma and stressor-related disorder           The therapist will continue to promote the therapeutic alliance, address the patients issues and concerns, and strengthen her self awareness, insights, and positive coping skills. Work with the patient on ways that she can build her self esteem so she will not care what others think about her.    Nothing has changed.  TREATMENT PLAN/GOALS: Continue supportive psychotherapy efforts and medications as indicated. Treatment and medication options discussed during today's visit. Patient ackowledged and verbally consented to continue with current treatment plan and was educated on the importance of compliance with treatment and follow-up appointments.     Counseled patient regarding multimodal approach with healthy nutrition, healthy sleep, regular physical activity, social activities, counseling, and medications.      Coping skills reviewed and encouraged positive framing of thoughts. No suicidal ideation or homicidal ideation at this time.      Assisted patient in processing above session content; acknowledged and normalized patient’s thoughts, feelings, and concerns.  Applied  positive coping skills and behavior management in session.    Allowed patient to freely discuss issues without interruption or judgment. Provided safe, confidential environment to facilitate the development of positive therapeutic relationship and encourage open, honest communication. Assisted patient in identifying risk factors which would indicate the need for higher level of care  including thoughts to harm self or others and/or self-harming behavior and encouraged patient to contact this office, call 911, or present to the nearest emergency room should any of these events occur. Discussed crisis intervention services and means to access.     Follow Up:   No follow-ups on file.    PADMA Santoyo  This document has been electronically signed by PADMA Santoyo  February 6, 2024 13:19 EST    Please note that portions of this note were completed with a voice recognition program. Efforts were made to edit dictation, but occasionally words are mistranscribed.

## 2024-02-29 ENCOUNTER — OFFICE VISIT (OUTPATIENT)
Dept: BEHAVIORAL HEALTH | Facility: CLINIC | Age: 24
End: 2024-02-29
Payer: COMMERCIAL

## 2024-02-29 VITALS
SYSTOLIC BLOOD PRESSURE: 124 MMHG | BODY MASS INDEX: 45.99 KG/M2 | OXYGEN SATURATION: 98 % | WEIGHT: 293 LBS | HEART RATE: 108 BPM | DIASTOLIC BLOOD PRESSURE: 90 MMHG | HEIGHT: 67 IN

## 2024-02-29 DIAGNOSIS — F43.10 PTSD (POST-TRAUMATIC STRESS DISORDER): ICD-10-CM

## 2024-02-29 DIAGNOSIS — F33.1 MODERATE EPISODE OF RECURRENT MAJOR DEPRESSIVE DISORDER: Primary | ICD-10-CM

## 2024-02-29 RX ORDER — LORATADINE 10 MG/1
TABLET ORAL
Qty: 90 TABLET | Refills: 3 | Status: SHIPPED | OUTPATIENT
Start: 2024-02-29

## 2024-02-29 RX ORDER — PROPRANOLOL HYDROCHLORIDE 20 MG/1
20 TABLET ORAL 3 TIMES DAILY
COMMUNITY
Start: 2024-01-29

## 2024-02-29 NOTE — PROGRESS NOTES
"               Follow Up Office Visit      Date: 2024   Patient Name: Elayne Hernandez  : 2000   MRN: [id number]     Referring Provider: Noy Manzano APRN    Chief Complaint:      ICD-10-CM ICD-9-CM   1. Moderate episode of recurrent major depressive disorder  F33.1 296.32   2. PTSD (post-traumatic stress disorder)  F43.10 309.81        History of Present Illness:   Elayne Hernandez is a 23 y.o. female who is here today for follow up with medication management for depression, anxiety, and PTSD.  At last visit, we transitioned her from Cymbalta to Pristiq, keeping the Wellbutrin and propranolol the same.  Since then, she reports no side effects, no adverse effects, but says, \"we probably need to give it a fair shot.\"  Over the past 6 weeks, she has been going through some hormonal changes secondary to birth control adjustments, and she has noticed increased mood lability and around the same level of depression she felt on Cymbalta.  She states, \"Depression wise, I still feel the same.  Anger is still an issue though.\"  In short, she does not feel worse, but she does not feel better yet either.  She did discover that Pristiq is one of the only anxiety/depression medications in her green column on her [employer] report, so she is hopeful that it will end up being beneficial to her.  No major issues with sleep or appetite, no SI/HI/psychotic/manic symptoms present.    Subjective     Review of Systems:   Review of Systems   Psychiatric/Behavioral:  Positive for depressed mood and stress. The patient is nervous/anxious.        Screening Scores:   PHQ-9 : 11 (last visit, 16)  MARY-7 : 13 (last visit, 18)    Medications:     Current Outpatient Medications:     acetaminophen (TYLENOL) 500 MG tablet, Take 1 tablet by mouth Every 6 (Six) Hours As Needed for Mild Pain., Disp: , Rfl:     baclofen (LIORESAL) 10 MG tablet, Take 1 tablet by mouth 3 (Three) Times a Day As Needed for Muscle Spasms., Disp: 90 tablet, " "Rfl: 1    buPROPion XL (WELLBUTRIN XL) 150 MG 24 hr tablet, Take 1 tablet by mouth Every Morning., Disp: 30 tablet, Rfl: 0    busPIRone (BUSPAR) 10 MG tablet, Take 1 tablet by mouth Daily., Disp: 30 tablet, Rfl: 2    desvenlafaxine (PRISTIQ) 50 MG 24 hr tablet, Take 1 tablet by mouth Daily., Disp: 30 tablet, Rfl: 2    ibuprofen (ADVIL,MOTRIN) 200 MG tablet, Take 1 tablet by mouth Every 6 (Six) Hours As Needed for Mild Pain., Disp: , Rfl:     loratadine (CLARITIN) 10 MG tablet, Take 1 tablet by mouth once daily, Disp: 90 tablet, Rfl: 3    pantoprazole (PROTONIX) 20 MG EC tablet, Take 1 tablet by mouth once daily, Disp: 90 tablet, Rfl: 1    propranolol (INDERAL) 20 MG tablet, Take 1 tablet by mouth 3 (Three) Times a Day. Take 1/2 PRN, Disp: , Rfl:     Tri-Sprintec 0.18/0.215/0.25 MG-35 MCG per tablet, Take 1 tablet by mouth once daily, Disp: 28 tablet, Rfl: 0    Allergies:   Allergies   Allergen Reactions    Augmentin [Amoxicillin-Pot Clavulanate] Rash    Levofloxacin Rash       Results Reviewed: Patient brought [employer] report, results scanned into chart under \"media\"     The following portion of the patient's history were reviewed and updated appropriately: allergies, current and past medications, family history, medical history and social history.    Objective     Vital Signs:   Vitals:    02/29/24 1622   BP: 124/90   Pulse: 108   SpO2: 98%   Weight: (!) 136 kg (300 lb 1.6 oz)   Height: 170.2 cm (67\")     Body mass index is 47 kg/m².     Mental Status Exam:   MENTAL STATUS EXAM   General Appearance:  Cleanly groomed and dressed  Eye Contact:  Good eye contact  Attitude:  Cooperative  Motor Activity:  Normal gait, posture and fidgety  Muscle Strength:  Normal  Speech:  Normal rate, tone, volume  Language:  Spontaneous  Mood and affect:  Normal, pleasant, anxious and blunted  Hopelessness:  1  Thought Process:  Logical and goal-directed  Associations/ Thought Content:  No delusions  Hallucinations:  None  Suicidal " Ideations:  Not present  Homicidal Ideation:  Not present  Sensorium:  Alert and clear  Orientation:  Person, place, time and situation  Immediate Recall, Recent, and Remote Memory:  Intact  Attention Span/ Concentration:  Good  Fund of Knowledge:  Appropriate for age and educational level  Intellectual Functioning:  Average range  Insight:  Good  Judgement:  Good  Reliability:  Good  Impulse Control:  Good        SUICIDE RISK ASSESSMENT/CSSRS:  1. Does patient have thoughts of suicide? no  2. Does patient have intent for suicide? no  3. Does patient have a current plan for suicide? no  4. History of suicide attempts: no  5. Family history of suicide or attempts: no  6. History of violent behaviors towards others or property or thoughts of committing suicide: no  7. History of sexual aggression toward others: no  8. Access to firearms or weapons: no    Labs Reviewed: n/a  UDS Reviewed: n/a  Chart since last visit reviewed: n/a    Assessment / Plan    Quality Measures:  Tobacco cessation: n/a, patient denies tobacco use    Depression (PHQ >9): Addressed this visit, medication management, screening score monitoring, and supportive care    Medication Considerations:  Benzo: n/a  Stimulants: n/a   VAMSHI reviewed and appropriate.     Safety: No acute safety concerns    Risk Assessment: Risk of self-harm acutely is low. Risk of self-harm chronically is also low, but could be further elevated in the event of treatment noncompliance and/or AODA.    Visit Diagnosis/Orders Placed This Visit:  Diagnoses and all orders for this visit:    1. Moderate episode of recurrent major depressive disorder (Primary)    2. PTSD (post-traumatic stress disorder)         Impression/Formulation:  Patient appeared alert and oriented.  Patient is voluntarily continuing to receive psychiatric care at [facility].   Patient is receptive to assistance with maintaining a stable lifestyle.  Patient presents with history of      ICD-10-CM ICD-9-CM   1. Moderate episode of recurrent major depressive disorder  F33.1 296.32   2. PTSD (post-traumatic stress disorder)  F43.10 309.81   .     Treatment Plan:   He knew all medications at previous doses, and follow-up in 4 weeks.    Any medications prescribed have been sent electronically to   [facility] [address] [place] [address] - [address] [facility] - [phone]  - [phone] FX  [address] [facility] [address]  Phone: [phone] Fax: [phone]      Patient will continue supportive psychotherapy efforts and medications as indicated.  Discussed medication options and treatment plan of prescribed medication(s) as well as the risks, benefits, and potential side effects. Patient will contact this office, call 911 or present to the nearest emergency room should suicidal or homicidal ideations occur. Clinic will obtain release of information for current treatment team for continuity of care as needed. Patient ackowledged and verbally consented to continue with current treatment plan and was educated on the importance of compliance with treatment and follow-up appointments.     Follow Up:   Return in about 4 weeks (around 3/28/2024) for Medication Management.    VALENTINE Beth  [facility]    This is electronically signed by VALENTINE Beth  02/29/2024 14:32 EST

## 2024-03-04 DIAGNOSIS — F33.1 MODERATE EPISODE OF RECURRENT MAJOR DEPRESSIVE DISORDER: Primary | ICD-10-CM

## 2024-03-04 DIAGNOSIS — F41.1 GAD (GENERALIZED ANXIETY DISORDER): ICD-10-CM

## 2024-03-04 DIAGNOSIS — F43.10 PTSD (POST-TRAUMATIC STRESS DISORDER): ICD-10-CM

## 2024-03-26 ENCOUNTER — OFFICE VISIT (OUTPATIENT)
Dept: BEHAVIORAL HEALTH | Facility: CLINIC | Age: 24
End: 2024-03-26
Payer: COMMERCIAL

## 2024-03-26 VITALS — HEIGHT: 67 IN | WEIGHT: 293 LBS | BODY MASS INDEX: 45.99 KG/M2

## 2024-03-26 DIAGNOSIS — F41.1 GAD (GENERALIZED ANXIETY DISORDER): ICD-10-CM

## 2024-03-26 DIAGNOSIS — F33.1 MODERATE EPISODE OF RECURRENT MAJOR DEPRESSIVE DISORDER: ICD-10-CM

## 2024-03-26 RX ORDER — LEVOMEFOLATE CALCIUM 15 MG
15 TABLET ORAL DAILY
Qty: 30 TABLET | Refills: 1 | Status: SHIPPED | OUTPATIENT
Start: 2024-03-26

## 2024-03-26 RX ORDER — BUSPIRONE HYDROCHLORIDE 10 MG/1
10 TABLET ORAL 3 TIMES DAILY
Qty: 90 TABLET | Refills: 1 | Status: SHIPPED | OUTPATIENT
Start: 2024-03-26

## 2024-03-26 RX ORDER — DESVENLAFAXINE SUCCINATE 50 MG/1
50 TABLET, EXTENDED RELEASE ORAL DAILY
Qty: 90 TABLET | Refills: 1 | Status: SHIPPED | OUTPATIENT
Start: 2024-03-26

## 2024-03-26 NOTE — PATIENT INSTRUCTIONS
[email or web address].com    Brenda daroi, [employer] d[employer]: free library access, audiobooks too    Unf**k Your Brain, Fatimah Howell  No Bad Parts, Jerrod Fan    Supplement with L-methylfolate 15 mg (more bioavailable form of folic acid)    [email or web address]

## 2024-03-26 NOTE — PROGRESS NOTES
"               Follow Up Office Visit      Date: 2024   Patient Name: Elayne Hernandez  : 2000   MRN: [id number]     Referring Provider: Noy Manzano APRN    Chief Complaint:      ICD-10-CM ICD-9-CM   1. Moderate episode of recurrent major depressive disorder  F33.1 296.32   2. MARY (generalized anxiety disorder)  F41.1 300.02        History of Present Illness:   Elayne Hernandez is a 23 y.o. female who is here today for follow up with medication management for depression and anxiety. Since last visit, she has discontinued the Wellbutrin on her own; she now just takes Pristiq, and says, \"I feel like I'm doing better, mentally.\" She reports a general decrease in the intensity of depressed feelings and the frequency of episodes of low mood. She does have mild withdrawal symptoms when she is late on her Pristiq dose, but does not wish to make changes at this time. No SI/HI/psychotic/manic symptoms, no issues with appetite or sleep, and no side/adverse medication effects reported.    Subjective     Review of Systems:   Review of Systems   Psychiatric/Behavioral: Negative.         Screening Scores:   PHQ-9 : 12 (last visit, 11)  MARY-7 : 11 (last visit, 13)    Medications:     Current Outpatient Medications:     acetaminophen (TYLENOL) 500 MG tablet, Take 1 tablet by mouth Every 6 (Six) Hours As Needed for Mild Pain., Disp: , Rfl:     baclofen (LIORESAL) 10 MG tablet, Take 1 tablet by mouth 3 (Three) Times a Day As Needed for Muscle Spasms., Disp: 90 tablet, Rfl: 1    busPIRone (BUSPAR) 10 MG tablet, Take 1 tablet by mouth 3 (Three) Times a Day., Disp: 90 tablet, Rfl: 1    desvenlafaxine (PRISTIQ) 50 MG 24 hr tablet, Take 1 tablet by mouth Daily., Disp: 90 tablet, Rfl: 1    ibuprofen (ADVIL,MOTRIN) 200 MG tablet, Take 1 tablet by mouth Every 6 (Six) Hours As Needed for Mild Pain., Disp: , Rfl:     loratadine (CLARITIN) 10 MG tablet, Take 1 tablet by mouth once daily, Disp: 90 tablet, Rfl: 3    " "pantoprazole (PROTONIX) 20 MG EC tablet, Take 1 tablet by mouth once daily, Disp: 90 tablet, Rfl: 1    propranolol (INDERAL) 20 MG tablet, Take 1 tablet by mouth 3 (Three) Times a Day. Take 1/2 PRN, Disp: , Rfl:     Tri-Sprintec 0.18/0.215/0.25 MG-35 MCG per tablet, Take 1 tablet by mouth once daily, Disp: 28 tablet, Rfl: 0    l-methylfolate 15 MG tablet tablet, Take 1 tablet by mouth Daily., Disp: 30 tablet, Rfl: 1    Allergies:   Allergies   Allergen Reactions    Augmentin [Amoxicillin-Pot Clavulanate] Rash    Levofloxacin Rash       Results Reviewed: [employer] (patient brought records from home)     The following portion of the patient's history were reviewed and updated appropriately: allergies, current and past medications, family history, medical history and social history.    Objective     Vital Signs:   Vitals:    03/26/24 1513   Weight: 136 kg (299 lb)   Height: 170.2 cm (67\")     Body mass index is 46.83 kg/m².     Mental Status Exam:   MENTAL STATUS EXAM   General Appearance:  Cleanly groomed and dressed  Eye Contact:  Good eye contact  Attitude:  Cooperative  Motor Activity:  Normal gait, posture  Muscle Strength:  Normal  Speech:  Normal rate, tone, volume  Language:  Spontaneous  Mood and affect:  Normal, pleasant  Hopelessness:  2  Loneliness: 2  Thought Process:  Logical  Associations/ Thought Content:  No delusions  Hallucinations:  None  Suicidal Ideations:  Not present  Homicidal Ideation:  Not present  Sensorium:  Alert and clear  Orientation:  Person, place, time and situation  Immediate Recall, Recent, and Remote Memory:  Intact  Attention Span/ Concentration:  Good  Fund of Knowledge:  Appropriate for age and educational level  Intellectual Functioning:  Average range  Insight:  Good  Judgement:  Good  Reliability:  Good  Impulse Control:  Good        SUICIDE RISK ASSESSMENT/CSSRS:  1. Does patient have thoughts of suicide? no  2. Does patient have intent for suicide? no  3. Does patient have " a current plan for suicide? no  4. History of suicide attempts: no  5. Family history of suicide or attempts: no  6. History of violent behaviors towards others or property or thoughts of committing suicide: no  7. History of sexual aggression toward others: no  8. Access to firearms or weapons: no    Labs Reviewed: n/a  UDS Reviewed: n/a  Chart since last visit reviewed: yes    Assessment / Plan    Quality Measures:  Tobacco cessation: Patient denies tobacco use. No tobacco cessation education necessary.    Depression (PHQ >9): Addressed this visit, medication management, screening score monitoring, nd supportive care.    Medication Considerations:  Benzo: n/a  Stimulants: n/a   VAMSHI reviewed and appropriate.     Safety: No acute safety concerns    Risk Assessment: Risk of self-harm acutely is low. Risk of self-harm chronically is also low, but could be further elevated in the event of treatment noncompliance and/or AODA.    Visit Diagnosis/Orders Placed This Visit:  Diagnoses and all orders for this visit:    1. Moderate episode of recurrent major depressive disorder  -     busPIRone (BUSPAR) 10 MG tablet; Take 1 tablet by mouth 3 (Three) Times a Day.  Dispense: 90 tablet; Refill: 1  -     desvenlafaxine (PRISTIQ) 50 MG 24 hr tablet; Take 1 tablet by mouth Daily.  Dispense: 90 tablet; Refill: 1  -     l-methylfolate 15 MG tablet tablet; Take 1 tablet by mouth Daily.  Dispense: 30 tablet; Refill: 1    2. MARY (generalized anxiety disorder)  -     busPIRone (BUSPAR) 10 MG tablet; Take 1 tablet by mouth 3 (Three) Times a Day.  Dispense: 90 tablet; Refill: 1  -     desvenlafaxine (PRISTIQ) 50 MG 24 hr tablet; Take 1 tablet by mouth Daily.  Dispense: 90 tablet; Refill: 1  -     l-methylfolate 15 MG tablet tablet; Take 1 tablet by mouth Daily.  Dispense: 30 tablet; Refill: 1         Impression/Formulation:  Patient appeared alert and oriented.  Patient is voluntarily continuing to receive psychiatric care at [facility].   Patient is receptive to assistance with maintaining a stable lifestyle.  Patient presents with history of     ICD-10-CM ICD-9-CM   1. Moderate episode of recurrent major depressive disorder  F33.1 296.32   2. MARY (generalized anxiety disorder)  F41.1 300.02   .     Treatment Plan:   Continue Pristiq 50 mg daily. Add Buspar 10 mg TID PRN for anxiety. [employer] results were incomplete; folic acid conversion results not included. Advised patient that l-methylfolate was safe to take, with or without folic acid conversion difficulties. Ok to supplement with OTC forms, or prescription, whichever was more affordable. Follow up in 6 weeks.    Any medications prescribed have been sent electronically to [facility] in [place].     Patient will continue supportive psychotherapy efforts and medications as indicated.  Discussed medication options and treatment plan of prescribed medication(s) as well as the risks, benefits, and potential side effects. Patient will contact this office, call 911 or present to the nearest emergency room should suicidal or homicidal ideations occur. Clinic will obtain release of information for current treatment team for continuity of care as needed. Patient ackowledged and verbally consented to continue with current treatment plan and was educated on the importance of compliance with treatment and follow-up appointments.     Follow Up:   Return in about 6 weeks (around 5/7/2024) for Medication Management.        VALENTINE Beth  [facility]    This is electronically signed by VALENTINE Beth  03/26/2024 15:18 EDT

## 2024-05-07 ENCOUNTER — OFFICE VISIT (OUTPATIENT)
Dept: BEHAVIORAL HEALTH | Facility: CLINIC | Age: 24
End: 2024-05-07
Payer: COMMERCIAL

## 2024-05-07 VITALS — WEIGHT: 293 LBS | BODY MASS INDEX: 45.99 KG/M2 | HEIGHT: 67 IN

## 2024-05-07 DIAGNOSIS — F41.1 GAD (GENERALIZED ANXIETY DISORDER): ICD-10-CM

## 2024-05-07 DIAGNOSIS — F33.1 MODERATE EPISODE OF RECURRENT MAJOR DEPRESSIVE DISORDER: ICD-10-CM

## 2024-05-07 DIAGNOSIS — F90.0 ATTENTION DEFICIT HYPERACTIVITY DISORDER (ADHD), PREDOMINANTLY INATTENTIVE TYPE: Primary | ICD-10-CM

## 2024-05-07 RX ORDER — DESVENLAFAXINE SUCCINATE 50 MG/1
50 TABLET, EXTENDED RELEASE ORAL DAILY
Qty: 90 TABLET | Refills: 1 | Status: SHIPPED | OUTPATIENT
Start: 2024-05-07

## 2024-05-07 RX ORDER — BUSPIRONE HYDROCHLORIDE 10 MG/1
10 TABLET ORAL 3 TIMES DAILY
Qty: 90 TABLET | Refills: 1 | Status: SHIPPED | OUTPATIENT
Start: 2024-05-07

## 2024-05-07 RX ORDER — PROPRANOLOL HYDROCHLORIDE 20 MG/1
20 TABLET ORAL 3 TIMES DAILY
Qty: 90 TABLET | Refills: 1 | Status: SHIPPED | OUTPATIENT
Start: 2024-05-07

## 2024-05-07 RX ORDER — DEXTROAMPHETAMINE SACCHARATE, AMPHETAMINE ASPARTATE, DEXTROAMPHETAMINE SULFATE AND AMPHETAMINE SULFATE 2.5; 2.5; 2.5; 2.5 MG/1; MG/1; MG/1; MG/1
10 TABLET ORAL 2 TIMES DAILY
Qty: 60 TABLET | Refills: 0 | Status: SHIPPED | OUTPATIENT
Start: 2024-05-07

## 2024-06-19 DIAGNOSIS — Z30.011 ENCOUNTER FOR INITIAL PRESCRIPTION OF CONTRACEPTIVE PILLS: ICD-10-CM

## 2024-06-20 RX ORDER — NORGESTIMATE AND ETHINYL ESTRADIOL 7DAYSX3 28
1 KIT ORAL DAILY
Qty: 28 TABLET | Refills: 0 | Status: SHIPPED | OUTPATIENT
Start: 2024-06-20

## 2024-07-16 DIAGNOSIS — Z30.011 ENCOUNTER FOR INITIAL PRESCRIPTION OF CONTRACEPTIVE PILLS: ICD-10-CM

## 2024-07-17 RX ORDER — NORGESTIMATE AND ETHINYL ESTRADIOL 7DAYSX3 28
1 KIT ORAL DAILY
Qty: 28 TABLET | Refills: 0 | Status: SHIPPED | OUTPATIENT
Start: 2024-07-17

## 2024-08-09 ENCOUNTER — OFFICE VISIT (OUTPATIENT)
Dept: INTERNAL MEDICINE | Facility: CLINIC | Age: 24
End: 2024-08-09
Payer: COMMERCIAL

## 2024-08-09 VITALS
RESPIRATION RATE: 18 BRPM | WEIGHT: 287 LBS | HEART RATE: 89 BPM | DIASTOLIC BLOOD PRESSURE: 81 MMHG | BODY MASS INDEX: 43.5 KG/M2 | SYSTOLIC BLOOD PRESSURE: 132 MMHG | OXYGEN SATURATION: 100 % | HEIGHT: 68 IN | TEMPERATURE: 97.7 F

## 2024-08-09 DIAGNOSIS — Z12.4 CERVICAL CANCER SCREENING: ICD-10-CM

## 2024-08-09 DIAGNOSIS — Z30.41 ENCOUNTER FOR SURVEILLANCE OF CONTRACEPTIVE PILLS: ICD-10-CM

## 2024-08-09 DIAGNOSIS — Z83.3 FAMILY HISTORY OF DIABETES MELLITUS: ICD-10-CM

## 2024-08-09 DIAGNOSIS — M79.7 FIBROMYALGIA: ICD-10-CM

## 2024-08-09 DIAGNOSIS — E66.01 CLASS 3 SEVERE OBESITY DUE TO EXCESS CALORIES WITHOUT SERIOUS COMORBIDITY WITH BODY MASS INDEX (BMI) OF 40.0 TO 44.9 IN ADULT: ICD-10-CM

## 2024-08-09 DIAGNOSIS — F90.0 ATTENTION DEFICIT HYPERACTIVITY DISORDER (ADHD), PREDOMINANTLY INATTENTIVE TYPE: ICD-10-CM

## 2024-08-09 DIAGNOSIS — Z71.85 IMMUNIZATION COUNSELING: ICD-10-CM

## 2024-08-09 DIAGNOSIS — F33.1 MODERATE EPISODE OF RECURRENT MAJOR DEPRESSIVE DISORDER: ICD-10-CM

## 2024-08-09 DIAGNOSIS — E78.5 DYSLIPIDEMIA: ICD-10-CM

## 2024-08-09 DIAGNOSIS — Z00.00 ANNUAL PHYSICAL EXAM: Primary | ICD-10-CM

## 2024-08-09 LAB
B-HCG UR QL: NEGATIVE
EXPIRATION DATE: ABNORMAL
INTERNAL NEGATIVE CONTROL: ABNORMAL
INTERNAL POSITIVE CONTROL: ABNORMAL
Lab: ABNORMAL

## 2024-08-09 RX ORDER — NORGESTIMATE AND ETHINYL ESTRADIOL 7DAYSX3 28
1 KIT ORAL DAILY
Qty: 28 TABLET | Refills: 12 | Status: SHIPPED | OUTPATIENT
Start: 2024-08-09

## 2024-08-09 RX ORDER — PANTOPRAZOLE SODIUM 20 MG/1
20 TABLET, DELAYED RELEASE ORAL DAILY
Qty: 90 TABLET | Refills: 3 | Status: SHIPPED | OUTPATIENT
Start: 2024-08-09

## 2024-08-09 NOTE — PROGRESS NOTES
Subjective   Elayne Hernandez is a 23 y.o. female and is here for a comprehensive physical exam. The patient reports no problems.    HPI: here today for annual exam with no acute complaints.   Mood is managed by behavioral health and she feels she is doing well, no complaints.   Fibromyalgia pain has been worse since changing from duloxetine but overall tolerable. Uses OTC ibuprofen and tylenol PRN.   She is currently on OCP which regulates her menses. Denies migraine with aura, liver disease, or history of blood clot.   GERD is managed with 20 mg pantoprazole daily, if she misses 1 dose has breakthrough symptoms. Denies epigastric pain, unexplained weight loss, hematochezia, or changes in appetite.   She is due for pap and tdap.   Would like labs due to family history of diabetes and heart disease.     Health Habits:  Eye exam within last 2 years? No ,will schedule.   Dental exam every 6 months? Yes.   Exercise habits: Working 6-8 hours per day at work - works a [employer]   Healthy diet? Eats mostly at home , likes fruit, veggies and meat     The ASCVD Risk score (Kobuk DK, et al., 2019) failed to calculate for the following reasons:    The 2019 ASCVD risk score is only valid for ages 40 to 79        Do you take any herbs or supplements that were not prescribed by a doctor? no  Are you taking calcium supplements? No  Are you taking aspirin daily? No     History:  LMP: Patient's last menstrual period was 07/07/2024 (exact date).  Menopause: No  Last pap date: 2023  Abnormal pap? no  Family history of breast or ovarian cancer: no         OB History   No obstetric history on file.      reports being sexually active and has had partner(s) who are male. She reports using the following methods of birth control/protection: Condom, Pill, OCP, and Birth control pill.        The following portions of the patient's history were reviewed and updated as appropriate: She  has a past medical history of ADHD (attention  deficit hyperactivity disorder) (03/2022), Allergic (2013), Anemia, Anxiety, Depression, Fibromyalgia, primary, Gall stones, GERD (gastroesophageal reflux disease), History of medical problems (2017), and Migraine.  She does not have any pertinent problems on file.  She  has a past surgical history that includes Cholecystectomy (06/2018); [place] tooth extraction; Tonsillectomy; Adenoidectomy; and Abdominal surgery (06/2018).  Her family history includes Anxiety disorder in her mother and sister; Arthritis in her mother; Bipolar disorder in her sister; COPD in her father; Depression in her mother and sister; Diabetes in her father and paternal grandfather; Heart attack in her maternal grandfather; Heart disease in her mother; Hyperlipidemia in her father and mother; Hypertension in her father and mother; Lung cancer in her maternal grandfather and maternal uncle; Mental illness in her mother and sister; Obesity in her mother.  She  reports that she has never smoked. She has never used smokeless tobacco. She reports that she does not drink alcohol and does not use drugs.  Current Outpatient Medications   Medication Sig Dispense Refill    acetaminophen (TYLENOL) 500 MG tablet Take 1 tablet by mouth Every 6 (Six) Hours As Needed for Mild Pain.      amphetamine-dextroamphetamine (Adderall) 10 MG tablet Take 1 tablet by mouth 2 (Two) Times a Day. 60 tablet 0    busPIRone (BUSPAR) 10 MG tablet Take 1 tablet by mouth 3 (Three) Times a Day. 90 tablet 1    desvenlafaxine (PRISTIQ) 50 MG 24 hr tablet Take 1 tablet by mouth Daily. 90 tablet 1    ibuprofen (ADVIL,MOTRIN) 200 MG tablet Take 1 tablet by mouth Every 6 (Six) Hours As Needed for Mild Pain.      loratadine (CLARITIN) 10 MG tablet Take 1 tablet by mouth once daily 90 tablet 3    pantoprazole (PROTONIX) 20 MG EC tablet Take 1 tablet by mouth once daily 90 tablet 1    propranolol (INDERAL) 20 MG tablet Take 1 tablet by mouth 3 (Three) Times a Day. Take 1/2 PRN 90 tablet  "1    Tri-Sprintec 0.18/0.215/0.25 MG-35 MCG per tablet Take 1 tablet by mouth once daily 28 tablet 0    baclofen (LIORESAL) 10 MG tablet Take 1 tablet by mouth 3 (Three) Times a Day As Needed for Muscle Spasms. (Patient not taking: Reported on 8/9/2024) 90 tablet 1    l-methylfolate 15 MG tablet tablet Take 1 tablet by mouth Daily. (Patient not taking: Reported on 8/9/2024) 30 tablet 1     No current facility-administered medications for this visit.       Review of Systems  Review of Systems   Constitutional:  Negative for appetite change, fatigue and fever.   HENT:  Negative for congestion, sore throat and trouble swallowing.    Eyes:  Negative for blurred vision and visual disturbance.   Respiratory:  Negative for cough, shortness of breath and wheezing.    Cardiovascular:  Negative for chest pain, palpitations and leg swelling.   Gastrointestinal:  Positive for GERD. Negative for abdominal pain, blood in stool, constipation, diarrhea and nausea.   Endocrine: Negative for polydipsia, polyphagia and polyuria.   Genitourinary:  Negative for dysuria.   Musculoskeletal:  Positive for arthralgias. Negative for back pain and myalgias.   Skin:  Negative for rash.   Neurological:  Negative for dizziness, weakness, light-headedness and headache.   Psychiatric/Behavioral:  Negative for sleep disturbance and depressed mood. The patient is not nervous/anxious.          Objective   /81 (BP Location: Right arm, Patient Position: Sitting, Cuff Size: Large Adult)   Pulse 89   Temp 97.7 °F (36.5 °C)   Resp 18   Ht 171.5 cm (67.5\")   Wt 130 kg (287 lb)   LMP 07/07/2024 (Exact Date)   SpO2 100%   BMI 44.29 kg/m²   Physical Exam  Vitals and nursing note reviewed. Exam conducted with a chaperone present (Kp Archuleta CMA and VALENTINE Rosenthal student assisting).   Constitutional:       General: She is not in acute distress.     Appearance: Normal appearance.   HENT:      Right Ear: Tympanic membrane and ear canal " normal.      Left Ear: Tympanic membrane and ear canal normal.      Nose: Nose normal.      Mouth/Throat:      Mouth: Mucous membranes are moist.      Pharynx: Oropharynx is clear. No posterior oropharyngeal erythema.   Eyes:      Extraocular Movements: Extraocular movements intact.      Pupils: Pupils are equal, round, and reactive to light.   Neck:      Thyroid: No thyroid mass or thyromegaly.      Vascular: No carotid bruit.   Cardiovascular:      Rate and Rhythm: Normal rate and regular rhythm.      Pulses: Normal pulses.      Heart sounds: Normal heart sounds. No murmur heard.  Pulmonary:      Effort: Pulmonary effort is normal.      Breath sounds: Normal breath sounds. No wheezing.   Chest:      Chest wall: No mass or tenderness.   Breasts:     Preston Score is 5.      Right: Normal. No bleeding, inverted nipple, nipple discharge or skin change.      Left: Normal. No bleeding, inverted nipple, nipple discharge or skin change.   Abdominal:      General: Bowel sounds are normal. There is no distension.      Palpations: Abdomen is soft. There is no mass.      Tenderness: There is no abdominal tenderness.   Genitourinary:     Exam position: Lithotomy position.      Pubic Area: No rash.       Preston stage (genital): 5.      Labia:         Right: No rash or lesion.         Left: No rash or lesion.       Vagina: No vaginal discharge, tenderness, bleeding or lesions.      Cervix: No friability, lesion, erythema or cervical bleeding.      Uterus: Normal. Not enlarged.       Adnexa: Right adnexa normal and left adnexa normal.        Right: No tenderness.          Left: No tenderness.     Musculoskeletal:         General: No deformity. Normal range of motion.      Cervical back: Normal range of motion and neck supple. No muscular tenderness.   Lymphadenopathy:      Head:      Right side of head: No submandibular, tonsillar, preauricular or posterior auricular adenopathy.      Left side of head: No submandibular,  tonsillar, preauricular or posterior auricular adenopathy.      Cervical: No cervical adenopathy.      Upper Body:      Right upper body: No supraclavicular, axillary or pectoral adenopathy.      Left upper body: No supraclavicular, axillary or pectoral adenopathy.   Skin:     General: Skin is warm and dry.      Capillary Refill: Capillary refill takes less than 2 seconds.      Findings: No bruising or rash.   Neurological:      General: No focal deficit present.      Mental Status: She is alert and oriented to person, place, and time.      Gait: Gait normal.      Deep Tendon Reflexes: Reflexes normal.   Psychiatric:         Mood and Affect: Mood normal.         Behavior: Behavior normal.         Assessment & Plan   Healthy female exam.    Diagnosis Plan   1. Annual physical exam  Preventative maintenance discussed during visit and information provided in AVS. Tdap provided today.       2. Cervical cancer screening  LIQUID-BASED PAP SMEAR WITH HPV GENOTYPING IF ASCUS ([facility]KE,[facility],MAD)      3. Dyslipidemia  CBC No Differential    Comprehensive metabolic panel    Lipid panel    Vitamin B12    TSH    T4, free    pantoprazole (PROTONIX) 20 MG EC tablet    Update lipid panel fasting. Recommend low cholesterol diet.       4. Fibromyalgia  CBC No Differential    Comprehensive metabolic panel    Lipid panel    Vitamin B12    TSH    T4, free    Stable, continue tylenol PRN, walking, and stretching.       5. Encounter for surveillance of contraceptive pills  POCT pregnancy, urine    norgestimate-ethinyl estradiol (Tri-Sprintec) 0.18/0.215/0.25 MG-35 MCG per tablet    Brief Urine Lab Results  (Last result in the past 365 days)        Color   Clarity   Blood   Leuk Est   Nitrite   Protein   CREAT   Urine HCG        08/09/24 1714               Negative                 Discussed how to take birth control pills, take at the same time each day.  If one dose is missed take ASAP, does not require a back up method. If 2 or more doses are  missed please use back up method such as condoms for at least 7 days.   Some medications can decrease the effectiveness of OCP such as certain antibiotics. A backup method such as condoms during treatment is necessary for adequate pregnancy protection.   Discussed risks of OCPs including deep vein thrombosis, pulmonary embolism, heart attack, and stroke.   Discussed with patient birth control does not protect against sexually transmitted diseases.         6. Family history of diabetes mellitus  CBC No Differential    Comprehensive metabolic panel    Lipid panel    Vitamin B12    Hemoglobin A1c    TSH    T4, free    Check labs. High protein low carbohydrate diet encouraged.       7. Moderate episode of recurrent major depressive disorder  CBC No Differential    Comprehensive metabolic panel    Lipid panel    Vitamin B12    TSH    T4, free    Managed by behavioral health, continue current medication regimen and keep scheduled follow-up.       8. Attention deficit hyperactivity disorder (ADHD), predominantly inattentive type  CBC No Differential    Comprehensive metabolic panel    Lipid panel    Vitamin B12    TSH    T4, free    See above plan.       9. Class 3 severe obesity due to excess calories without serious comorbidity with body mass index (BMI) of 40.0 to 44.9 in adult  CBC No Differential    Comprehensive metabolic panel    Lipid panel    Vitamin B12    TSH    T4, free    Healthy diet and exercise recommendations.       10. Immunization counseling  We discussed the risks and benefits of Boostrix (Tdap).      Counseling was given to inform of the potential signs and symptoms of adverse effects and when to seek medical attention.      The CDC Vaccination Information Sheet (VIS) was given for review prior to administration.  A copy of the VIS was also offered.                2. Patient Counseling:  --Nutrition: Stressed importance of moderation in sodium/caffeine intake, saturated fat and cholesterol, caloric  balance, sufficient intake of fresh fruits, vegetables, fiber, calcium, iron, and 1 g folate supplementation if of childbearing age.   --Discussed the issue of calcium supplement, and the daily use of baby aspirin if applicable.             --Mammogram recommended every 2 years from age 40-49 and yearly beginning at age 50.  --Exercise: Stressed the importance of regular exercise.   --Substance Abuse: Discussed cessation/primary prevention of tobacco (if applicable), alcohol, or other drug use (if applicable); driving or other dangerous activities under the influence; availability of treatment for abuse.    --Sexuality: Discussed sexually transmitted diseases, partner selection, use of condoms, avoidance of unintended pregnancy  and contraceptive alternatives.   --Injury prevention: Discussed safety belts, safety helmets, smoke detector, smoking near bedding or upholstery.   --Dental health: Discussed importance of regular tooth brushing, flossing, and dental visits every 6 months.  --Immunizations reviewed.  --Discussed benefits of screening colonoscopy (if applicable).  --After hours service discussed with patient    3. Discussed the patient's BMI with her.  The BMI is above average; BMI management plan is completed  Class 3 Severe Obesity (BMI >=40). Obesity-related health conditions include the following: none. Obesity is improving with lifestyle modifications. BMI is is above average; BMI management plan is completed. We discussed portion control, increasing exercise, and Information on healthy weight added to patient's after visit summary.     4. Return in about 1 year (around 8/9/2025) for Annual physical.  VALENTINE Sanchez  08/09/2024  16:23 EDT

## 2024-08-17 LAB
ALBUMIN SERPL-MCNC: 4.2 G/DL (ref 4–5)
ALP SERPL-CCNC: 67 IU/L (ref 44–121)
ALT SERPL-CCNC: 20 IU/L (ref 0–32)
AST SERPL-CCNC: 19 IU/L (ref 0–40)
BILIRUB SERPL-MCNC: 0.3 MG/DL (ref 0–1.2)
BUN SERPL-MCNC: 7 MG/DL (ref 6–20)
BUN/CREAT SERPL: 11 (ref 9–23)
CALCIUM SERPL-MCNC: 9.4 MG/DL (ref 8.7–10.2)
CHLORIDE SERPL-SCNC: 105 MMOL/L (ref 96–106)
CHOLEST SERPL-MCNC: 176 MG/DL (ref 100–199)
CO2 SERPL-SCNC: 20 MMOL/L (ref 20–29)
CREAT SERPL-MCNC: 0.64 MG/DL (ref 0.57–1)
EGFRCR SERPLBLD CKD-EPI 2021: 128 ML/MIN/1.73
ERYTHROCYTE [DISTWIDTH] IN BLOOD BY AUTOMATED COUNT: 14.2 % (ref 11.7–15.4)
GLOBULIN SER CALC-MCNC: 2.4 G/DL (ref 1.5–4.5)
GLUCOSE SERPL-MCNC: 95 MG/DL (ref 70–99)
HBA1C MFR BLD: 5.4 % (ref 4.8–5.6)
HCT VFR BLD AUTO: 48.2 % (ref 34–46.6)
HDLC SERPL-MCNC: 36 MG/DL
HGB BLD-MCNC: 14.8 G/DL (ref 11.1–15.9)
LDLC SERPL CALC-MCNC: 109 MG/DL (ref 0–99)
MCH RBC QN AUTO: 27 PG (ref 26.6–33)
MCHC RBC AUTO-ENTMCNC: 30.7 G/DL (ref 31.5–35.7)
MCV RBC AUTO: 88 FL (ref 79–97)
PLATELET # BLD AUTO: 317 X10E3/UL (ref 150–450)
POTASSIUM SERPL-SCNC: 4.9 MMOL/L (ref 3.5–5.2)
PROT SERPL-MCNC: 6.6 G/DL (ref 6–8.5)
RBC # BLD AUTO: 5.49 X10E6/UL (ref 3.77–5.28)
REF LAB TEST METHOD: NORMAL
SODIUM SERPL-SCNC: 142 MMOL/L (ref 134–144)
T4 FREE SERPL-MCNC: 1.02 NG/DL (ref 0.82–1.77)
TRIGL SERPL-MCNC: 174 MG/DL (ref 0–149)
TSH SERPL DL<=0.005 MIU/L-ACNC: 1.41 UIU/ML (ref 0.45–4.5)
VIT B12 SERPL-MCNC: 393 PG/ML (ref 232–1245)
VLDLC SERPL CALC-MCNC: 31 MG/DL (ref 5–40)
WBC # BLD AUTO: 8.5 X10E3/UL (ref 3.4–10.8)

## 2024-09-14 DIAGNOSIS — F33.1 MODERATE EPISODE OF RECURRENT MAJOR DEPRESSIVE DISORDER: ICD-10-CM

## 2024-09-14 DIAGNOSIS — F41.1 GAD (GENERALIZED ANXIETY DISORDER): ICD-10-CM

## 2024-09-16 RX ORDER — BUSPIRONE HYDROCHLORIDE 10 MG/1
10 TABLET ORAL 3 TIMES DAILY
Qty: 90 TABLET | Refills: 0 | Status: SHIPPED | OUTPATIENT
Start: 2024-09-16

## 2024-09-21 PROBLEM — R30.9 PAINFUL URINATION: Status: ACTIVE | Noted: 2024-09-21

## 2024-09-21 PROBLEM — N91.2 AMENORRHEA: Status: ACTIVE | Noted: 2024-09-21

## 2024-10-01 ENCOUNTER — OFFICE VISIT (OUTPATIENT)
Dept: INTERNAL MEDICINE | Facility: CLINIC | Age: 24
End: 2024-10-01
Payer: COMMERCIAL

## 2024-10-01 VITALS
WEIGHT: 286 LBS | TEMPERATURE: 97.8 F | OXYGEN SATURATION: 100 % | BODY MASS INDEX: 43.35 KG/M2 | SYSTOLIC BLOOD PRESSURE: 132 MMHG | RESPIRATION RATE: 18 BRPM | DIASTOLIC BLOOD PRESSURE: 82 MMHG | HEIGHT: 68 IN | HEART RATE: 80 BPM

## 2024-10-01 DIAGNOSIS — R82.90 ABNORMAL FINDING ON URINALYSIS: ICD-10-CM

## 2024-10-01 DIAGNOSIS — R10.2 SUPRAPUBIC PAIN: Primary | ICD-10-CM

## 2024-10-01 LAB
BILIRUB BLD-MCNC: NEGATIVE MG/DL
CLARITY, POC: CLEAR
COLOR UR: YELLOW
EXPIRATION DATE: ABNORMAL
GLUCOSE UR STRIP-MCNC: NEGATIVE MG/DL
KETONES UR QL: NEGATIVE
LEUKOCYTE EST, POC: ABNORMAL
Lab: ABNORMAL
NITRITE UR-MCNC: NEGATIVE MG/ML
PH UR: 6 [PH] (ref 5–8)
PROT UR STRIP-MCNC: NEGATIVE MG/DL
RBC # UR STRIP: NEGATIVE /UL
SP GR UR: 1.02 (ref 1–1.03)
UROBILINOGEN UR QL: NORMAL

## 2024-10-01 PROCEDURE — 81003 URINALYSIS AUTO W/O SCOPE: CPT | Performed by: NURSE PRACTITIONER

## 2024-10-01 PROCEDURE — 99213 OFFICE O/P EST LOW 20 MIN: CPT | Performed by: NURSE PRACTITIONER

## 2024-10-01 NOTE — PROGRESS NOTES
"  Office Visit      Patient Name: Elayne Hernandez  : 2000   MRN: [id number]   Care Team: Patient Care Team:  Noy Manzano APRN as PCP - General (Family Medicine)  Melissa Nogueira LPCC as Counselor (Behavioral Health)  Karma Laura APRN as Nurse Practitioner (Behavioral Health)  Pauline Sharma APRN as Nurse Practitioner (Behavioral Health)    Chief Complaint  follow up discuss urine analysis    Subjective     Subjective      Elayne Hernandez presents to [facility] for [facility] follow-up.   Saw  [facility] and diagnosed with UTI , she was given bactrim for this. Urine culture actually negative.   She has since improved , continued bactrim in entirety due to her suprapubic pain. UA significant for cloudiness, ketones, and bilirubin which concerned her for a liver problem.   Denies hematuria, suprapubic pain, RUQ pain, jaundice, scleral icterus, urinary frequency, and urinary urgency.   She was diagnosed with gastroenteritis prior to UTI diagnosis. She is since feeling better, drinking fluids, and is ablet o hold on down foods.   She has no other acute concerns today.   Lab Results   Component Value Date    GLUCOSE 95 2024    BUN 7 2024    CREATININE 0.64 2024     2024    K 4.9 2024     2024    CALCIUM 9.4 2024    ALBUMIN 4.2 2024    ALT 20 2024    AST 19 2024    ALKPHOS 67 2024    BILITOT 0.3 2024    BCR 11 2024       Objective     Objective   Vital Signs:   /82   Pulse 80   Temp 97.8 °F (36.6 °C)   Resp 18   Ht 172.7 cm (68\")   Wt 130 kg (286 lb)   SpO2 100%   BMI 43.49 kg/m²     Physical Exam  Vitals and nursing note reviewed.   Constitutional:       General: She is not in acute distress.     Appearance: Normal appearance. She is obese.   Cardiovascular:      Rate and Rhythm: Normal rate and regular rhythm.      Heart sounds: Normal heart sounds. No murmur " heard.  Pulmonary:      Effort: Pulmonary effort is normal. No respiratory distress.      Breath sounds: Normal breath sounds. No wheezing.   Abdominal:      General: Bowel sounds are normal. There is no distension.      Palpations: Abdomen is soft.      Tenderness: There is no abdominal tenderness. There is no right CVA tenderness or left CVA tenderness.   Skin:     General: Skin is warm and dry.      Findings: No rash.   Neurological:      Mental Status: She is alert.   Psychiatric:         Mood and Affect: Mood normal.         Behavior: Behavior normal.          Assessment / Plan      Assessment & Plan   Problem List Items Addressed This Visit    None  Visit Diagnoses       Suprapubic pain    -  Primary    Relevant Orders    POCT urinalysis dipstick, automated (Completed)    Resolved, see below plan    Abnormal finding on urinalysis        Brief Urine Lab Results  (Last result in the past 365 days)        Color   Clarity   Blood   Leuk Est   Nitrite   Protein   CREAT   Urine HCG        10/01/24 1315 Yellow   Clear   Negative   Trace   Negative   Negative                 Reassurance provided, no indication for further testing at this time. Recommend staying hydrated with clear decaffeinated fluids, always wipe front to back, and discussed symptoms to return with.             Follow Up   Return for Annual physical.  Patient was given instructions and counseling regarding her condition or for health maintenance advice. Please see specific information pulled into the AVS if appropriate.     VALENTINE Natarajan  [facility]        Answers submitted by the patient for this visit:  Other (Submitted on 10/1/2024)  Please describe your symptoms.: Urinalysis results  Have you had these symptoms before?: Yes  How long have you been having these symptoms?: 1-2 weeks  Please list any medications you are currently taking for this condition.: Finisbed course of bactrim a few days  ago  Primary Reason for Visit (Submitted on 10/1/2024)  What is the primary reason for your visit?: Problem Not Listed

## 2025-02-07 RX ORDER — BACLOFEN 10 MG/1
10 TABLET ORAL 3 TIMES DAILY
Qty: 60 TABLET | Refills: 1 | Status: SHIPPED | OUTPATIENT
Start: 2025-02-07

## 2025-04-03 DIAGNOSIS — F41.1 GAD (GENERALIZED ANXIETY DISORDER): ICD-10-CM

## 2025-04-03 DIAGNOSIS — F33.1 MODERATE EPISODE OF RECURRENT MAJOR DEPRESSIVE DISORDER: ICD-10-CM

## 2025-04-03 RX ORDER — DESVENLAFAXINE 50 MG/1
50 TABLET, FILM COATED, EXTENDED RELEASE ORAL DAILY
Qty: 90 TABLET | Refills: 1 | Status: SHIPPED | OUTPATIENT
Start: 2025-04-03

## 2025-04-03 NOTE — TELEPHONE ENCOUNTER
Incoming Refill Request      Medication requested (name and dose): [facility]    Pharmacy where request should be sent: [facility]    Additional details provided by patient: n/a    Best call back number: [phone]      Does the patient have less than a 3 day supply:  [x] Yes  [] No    Peyton Nino  04/03/25, 14:11 EDT

## 2025-05-08 ENCOUNTER — OFFICE VISIT (OUTPATIENT)
Dept: BEHAVIORAL HEALTH | Facility: CLINIC | Age: 25
End: 2025-05-08

## 2025-05-08 VITALS — WEIGHT: 288 LBS | HEIGHT: 68 IN | BODY MASS INDEX: 43.65 KG/M2

## 2025-05-08 DIAGNOSIS — F33.0 MILD EPISODE OF RECURRENT MAJOR DEPRESSIVE DISORDER: ICD-10-CM

## 2025-05-08 DIAGNOSIS — F43.10 PTSD (POST-TRAUMATIC STRESS DISORDER): ICD-10-CM

## 2025-05-08 DIAGNOSIS — F41.1 GAD (GENERALIZED ANXIETY DISORDER): ICD-10-CM

## 2025-05-08 DIAGNOSIS — F90.0 ATTENTION DEFICIT HYPERACTIVITY DISORDER (ADHD), PREDOMINANTLY INATTENTIVE TYPE: Primary | ICD-10-CM

## 2025-05-08 RX ORDER — METHYLPHENIDATE HYDROCHLORIDE 10 MG/1
10 TABLET ORAL 2 TIMES DAILY
Qty: 60 TABLET | Refills: 0 | Status: SHIPPED | OUTPATIENT
Start: 2025-05-08

## 2025-05-08 NOTE — PROGRESS NOTES
Follow Up Office Visit      Date: 2025   Patient Name: Elayne Hernandez  : 2000   MRN: [id number]     Patient or patient representative verbalized consent for the use of Ambient Listening during the visit with  VALENTINE Bhatia for chart documentation. 5/15/2025  13:24 EDT    Chief Complaint:  Elayne Hernandez is a 24 y.o. female who is here today for follow up with medication management for ADHD, depression, anxiety and PTSD    History of Present Illness  She reports an overall improvement in her condition, although she is experiencing significant stress and feelings of being overwhelmed today. She has been managing her ADHD symptoms effectively, completing her homework on time and performing well at work. However, she experiences task paralysis when overwhelmed. She has discontinued Adderall due to its lack of efficacy and adverse effects on her heart rate. She has previously tried Wellbutrin and Focalin but has not used regular Ritalin. She is open to trying non-stimulant ADHD medications.    Her depression is well-managed with Pristiq, which she finds more beneficial for her mental health than Cymbalta. She experiences occasional depressive episodes, but they are not debilitating. She is willing to increase her Pristiq dosage to 100 mg but expresses concern about potential tapering issues if she decides to revert to the 50 mg dose.    Her anxiety remains high, although slightly improved. She believes her anxiety is primarily school-related and anticipates relief upon graduation next week. She uses propranolol as needed for anxiety-induced tachycardia. She has found BuSpar to be effective at higher doses and does not require a refill at this time.    She has previously engaged in therapy but did not find it beneficial due to the therapist's lack of recall of her personal information. She is currently uninsured.    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    Subjective     Review of Systems:   Review of Systems   Psychiatric/Behavioral:  Positive for decreased concentration, depressed mood and stress.        Screening Scores:   PHQ-9: 13 (last visit, 9)  MARY-7: 16 (last visit, 11)    Medications:     Current Outpatient Medications:     acetaminophen (TYLENOL) 500 MG tablet, Take 1 tablet by mouth Every 6 (Six) Hours As Needed for Mild Pain., Disp: , Rfl:     baclofen (LIORESAL) 10 MG tablet, Take 1 tablet by mouth 3 (Three) Times a Day., Disp: 60 tablet, Rfl: 1    busPIRone (BUSPAR) 10 MG tablet, TAKE 1 TABLET BY MOUTH THREE TIMES DAILY, Disp: 90 tablet, Rfl: 0    desvenlafaxine (PRISTIQ) 50 MG 24 hr tablet, Take 1 tablet by mouth Daily., Disp: 90 tablet, Rfl: 1    ibuprofen (ADVIL,MOTRIN) 200 MG tablet, Take 1 tablet by mouth Every 6 (Six) Hours As Needed for Mild Pain., Disp: , Rfl:     loratadine (CLARITIN) 10 MG tablet, Take 1 tablet by mouth once daily, Disp: 90 tablet, Rfl: 3    norgestimate-ethinyl estradiol (Tri-Sprintec) 0.18/0.215/0.25 MG-35 MCG per tablet, Take 1 tablet by mouth Daily., Disp: 28 tablet, Rfl: 12    ondansetron ODT (ZOFRAN-ODT) 4 MG disintegrating tablet, Place 1 tablet on the tongue Every 8 (Eight) Hours As Needed for Nausea or Vomiting., Disp: 9 tablet, Rfl: 0    pantoprazole (PROTONIX) 20 MG EC tablet, Take 1 tablet by mouth Daily., Disp: 90 tablet, Rfl: 3    propranolol (INDERAL) 20 MG tablet, Take 1 tablet by mouth 3 (Three) Times a Day. Take 1/2 PRN, Disp: 90 tablet, Rfl: 1    methylphenidate (Ritalin) 10 MG tablet, Take 1 tablet by mouth 2 (Two) Times a Day., Disp: 60 tablet, Rfl: 0    Allergies:   Allergies   Allergen Reactions    Augmentin [Amoxicillin-Pot Clavulanate] Rash    Levofloxacin Rash       Results Reviewed: n/a     The following portion of the patient's history were reviewed and updated  "appropriately: allergies, current and past medications, family history, medical history and social history.    Objective     Vital Signs:   Vitals:    05/08/25 1310   Weight: 131 kg (288 lb)   Height: 172.7 cm (68\")     Body mass index is 43.79 kg/m².     Mental Status Exam:   MENTAL STATUS EXAM   General Appearance:  Cleanly groomed and dressed  Eye Contact:  Good eye contact  Attitude:  Cooperative  Motor Activity:  Normal gait, posture and fidgety  Muscle Strength:  Normal  Speech:  Normal rate, tone, volume  Language:  Spontaneous  Mood and affect:  Normal, pleasant, depressed and anxious  Hopelessness:  Denies  Loneliness: Denies  Thought Process:  Logical  Associations/ Thought Content:  No delusions  Hallucinations:  None  Suicidal Ideations:  Not present  Homicidal Ideation:  Not present  Sensorium:  Alert and clear  Orientation:  Person, place, time and situation  Immediate Recall, Recent, and Remote Memory:  Intact  Attention Span/ Concentration:  Easily distracted  Fund of Knowledge:  Appropriate for age and educational level  Intellectual Functioning:  Average range  Insight:  Good  Judgement:  Good  Reliability:  Good  Impulse Control:  Good        SUICIDE RISK ASSESSMENT/CSSRS:  1. Does patient have thoughts of suicide? no  2. Does patient have intent for suicide? no  3. Does patient have a current plan for suicide? no  4. History of suicide attempts: no  5. Family history of suicide or attempts: no  6. History of violent behaviors towards others or property or thoughts of committing suicide: no  7. History of sexual aggression toward others: no  8. Access to firearms or weapons: no    Labs Reviewed: n/a  UDS Reviewed: n/a  Chart since last visit reviewed: yes    Assessment / Plan    Quality Measures:  Tobacco cessation: Patient denies tobacco use. No tobacco cessation education necessary.     Depression (PHQ >9): Patient screened positive for depression with a PHQ score of 13. Follow up " recommendations include medication management, suicide risk assessment, continued screening score monitoring and supportive care.    Medication Considerations:  Benzo: n/a  Stimulants: n/a   VAMSHI reviewed and appropriate.     Risk Assessment: Risk of self-harm acutely is low. Risk of self-harm chronically is also low, but could be further elevated in the event of treatment noncompliance and/or AODA.    Impression/Formulation:  Patient appeared alert and oriented.  Patient is voluntarily seeking psychiatric care at [facility].  Patient is receptive to assistance with maintaining a stable lifestyle.  Conditions being treated include     ICD-10-CM ICD-9-CM   1. Attention deficit hyperactivity disorder (ADHD), predominantly inattentive type  F90.0 314.00   2. Mild episode of recurrent major depressive disorder  F33.0 296.31   3. MARY (generalized anxiety disorder)  F41.1 300.02   4. PTSD (post-traumatic stress disorder)  F43.10 309.81   .     Visit Diagnosis/Orders Placed This Visit:  Diagnoses and all orders for this visit:    1. Attention deficit hyperactivity disorder (ADHD), predominantly inattentive type (Primary)  -     methylphenidate (Ritalin) 10 MG tablet; Take 1 tablet by mouth 2 (Two) Times a Day.  Dispense: 60 tablet; Refill: 0    2. Mild episode of recurrent major depressive disorder    3. MARY (generalized anxiety disorder)    4. PTSD (post-traumatic stress disorder)       Assessment & Plan  Content of Therapy:  During the session, the patient discussed her experiences with ADHD, including task paralysis when overwhelmed. She expressed dissatisfaction with Adderall due to its adverse effects on her heart rate and lack of efficacy. The patient also shared her positive response to Pristiq for depression management and her ongoing struggles with anxiety, particularly related to school. She recounted a recent workplace incident that triggered her PTSD symptoms and discussed her previous  "therapy experiences, highlighting the importance of finding a suitable therapist.    Clinical Impression:  The patient appears to be managing her ADHD symptoms reasonably well, although she experiences significant task paralysis when overwhelmed. Her depression is well-controlled with Pristiq, and she reports substantial improvement compared to her previous medication, Cymbalta. Anxiety remains a significant issue, though it has slightly improved. She anticipates further relief after her upcoming graduation. PTSD symptoms are triggered by workplace stressors, and she has not found previous therapy sessions beneficial due to the therapist's lack of recall of her personal information.    Therapeutic Intervention:  - Cognitive-behavioral strategies to address task paralysis and reframing thoughts related to ADHD.  - Psychoeducation on non-stimulant ADHD medications and their potential benefits.  - Mindfulness exercises to manage anxiety and stress.  - Exploration of trauma-related triggers and coping mechanisms for PTSD.    Plan:  - Prescribe Ritalin 10 mg, to be taken twice daily, starting with half a tablet and adjusting the dosage as needed, not exceeding 20 mg per day.  - Increase Pristiq dosage to 100 mg daily, taking two 50 mg tablets until the current supply is exhausted, then provide a prescription for 100 mg tablets.  - Continue using propranolol as needed for anxiety-induced tachycardia and PTSD symptoms.  - Recommend exploring non-pharmacologic approaches to ADHD management, including nutritional support and ADHD coaching.  - Suggest reading \"ADHD for Smart A** Women\" and \"ADHD 2.0\" for additional strategies.  - Encourage self-compassion practices and consider reading \"Self-Compassion\" by Stephenie Montero.  - Advise checking [email or web address] for potential therapists offering sliding scale or pro jazmín services.    Follow-up:  - Schedule a follow-up appointment in 4 weeks to assess the effectiveness of the new " ADHD medication and the increased Pristiq dosage.    Notes & Risk Factors:  - Risk factors for harm to self or others: None reported.  - Protective factors: Supportive , friend, and upcoming graduation.  - Additional notes: Patient is uninsured and has recently quit smoking (day 72).    Any medications prescribed have been sent electronically to [facility] in [place].       Patient will continue supportive psychotherapy efforts and medications as indicated.  Discussed medication options and treatment plan of prescribed medication(s) as well as the risks, benefits, and potential side effects. Patient will contact this office, call 911 or present to the nearest emergency room should suicidal or homicidal ideations occur. Clinic will obtain release of information for current treatment team for continuity of care as needed. Patient ackowledged and verbally consented to continue with current treatment plan and was educated on the importance of compliance with treatment and follow-up appointments.           VALENTINE Beth  [facility]    This is electronically signed by VALENTINE Beth  05/08/2025 13:24 EDT

## 2025-05-08 NOTE — PATIENT INSTRUCTIONS
[email or web address]: online directory of therapists    Chirag recommendations:  Brenda and Hoopla: free library access, including audiobooks and e-books  I Am: affirmations  Balance: mindfulness and meditation  Tobar: mental health chirag for kids and adults (user sets goals/tasks)  Gerson: ADHD/mental health chirag for parents and kids (parents set goals/tasks)  Mood Bloom: facilitated thought progression, helps with depression    Bilateral stimulation music: free on youtube and spotify    Book Recommendations:  How To Do The Work, Yesi Lawrence (follow the Holistic Psychologist)  Unf**k Your Brain, Fatimah Howell  The Body Keeps The Score, Bissel Van Preet Kolk  The Myth of Normal, Rodney Mate  No Bad Parts, Jerrod Fan (IFS)  Adult Children of Emotionally Immature Parents, Marialuisa Calhoun  Self Compassion, Gavi Montero*  ADHD 2.0, by Beronica  ADHD for Smart A** Women, Rachana Pabon

## 2025-05-27 DIAGNOSIS — F41.1 GAD (GENERALIZED ANXIETY DISORDER): ICD-10-CM

## 2025-05-27 DIAGNOSIS — F33.1 MODERATE EPISODE OF RECURRENT MAJOR DEPRESSIVE DISORDER: ICD-10-CM

## 2025-05-27 RX ORDER — BUSPIRONE HYDROCHLORIDE 10 MG/1
10 TABLET ORAL 3 TIMES DAILY
Qty: 90 TABLET | Refills: 0 | Status: SHIPPED | OUTPATIENT
Start: 2025-05-27

## 2025-06-17 ENCOUNTER — OFFICE VISIT (OUTPATIENT)
Dept: BEHAVIORAL HEALTH | Facility: CLINIC | Age: 25
End: 2025-06-17

## 2025-06-17 VITALS — HEIGHT: 68 IN | WEIGHT: 282 LBS | BODY MASS INDEX: 42.74 KG/M2

## 2025-06-17 DIAGNOSIS — F43.10 PTSD (POST-TRAUMATIC STRESS DISORDER): ICD-10-CM

## 2025-06-17 DIAGNOSIS — F90.0 ATTENTION DEFICIT HYPERACTIVITY DISORDER (ADHD), PREDOMINANTLY INATTENTIVE TYPE: Primary | ICD-10-CM

## 2025-06-17 DIAGNOSIS — F33.1 MODERATE EPISODE OF RECURRENT MAJOR DEPRESSIVE DISORDER: ICD-10-CM

## 2025-06-17 DIAGNOSIS — F41.1 GAD (GENERALIZED ANXIETY DISORDER): ICD-10-CM

## 2025-06-17 PROCEDURE — 96127 BRIEF EMOTIONAL/BEHAV ASSMT: CPT

## 2025-06-17 PROCEDURE — 99214 OFFICE O/P EST MOD 30 MIN: CPT

## 2025-06-17 RX ORDER — DESVENLAFAXINE 50 MG/1
50 TABLET, FILM COATED, EXTENDED RELEASE ORAL DAILY
Qty: 90 TABLET | Refills: 1 | Status: SHIPPED | OUTPATIENT
Start: 2025-06-17

## 2025-06-17 RX ORDER — SERDEXMETHYLPHENIDATE AND DEXMETHYLPHENIDATE 5.2; 26.1 MG/1; MG/1
1 CAPSULE ORAL EVERY MORNING
Qty: 30 CAPSULE | Refills: 0 | Status: SHIPPED | OUTPATIENT
Start: 2025-06-17

## 2025-06-17 NOTE — PROGRESS NOTES
Follow Up Office Visit      Date: 2025   Patient Name: Elayne Hernandez  : 2000   MRN: [id number]     Patient or patient representative verbalized consent for the use of Ambient Listening during the visit with  VALENTINE Bhatia for chart documentation. 2025  13:42 EDT    Chief Complaint:  Elayne Hernandez is a 24 y.o. female who is here today for follow up with medication management for ADHD, depression, anxiety and PTSD.    History of Present Illness  She has been inconsistent with her Pristiq dosage, often forgetting to adjust the dose as previously discussed. She has been taking Ritalin intermittently, approximately half of the prescribed days, due to forgetfulness. When she does take it, she experiences a heightened sense of excitement and energy but does not observe any significant changes in her heart rate. She recalls an instance at work where her heart rate spiked to 129, which she attributes to either anxiety or physical exertion. She reports that Ritalin enhances her productivity, although she feels the need to further experiment with it. She has been taking her medications at night and Ritalin in the morning if she remembers. Her work schedule varies, with shifts starting as early as 7:30 AM and as late as 5 PM. She believes that taking her medication at night would be beneficial. She has been using AirPods for bilateral stimulation and has been listening to audiobooks. She has been using the [employer] dario and has been setting an alarm on her watch to remind her to take her medications daily at 8 AM, but she has been ignoring it for the past 2 years. She has been experiencing intermittent nausea for years, and is questioning if it could be a side effect of her antidepressants. She has been using Zofran to manage her nausea.    Social History:  - Works as a manager  - Varying work shifts  - Discusses issues with family members    Psychiatric History:   - Previously tried  Adderall, which was ineffective and caused side effects  - Previously tried Focalin, which caused excessive sedation  - Currently experimenting with Ritalin    Subjective     Review of Systems:   Review of Systems   Respiratory:  Positive for shortness of breath.    Cardiovascular:  Negative for chest pain and palpitations.   Gastrointestinal:  Positive for nausea.   Neurological:  Negative for dizziness and confusion.   Psychiatric/Behavioral:  Positive for depressed mood and stress.        Screening Scores:   PHQ-9: 11 (last visit, 13)  MARY-7: 12 (last visit, 16)    Medications:     Current Outpatient Medications:     acetaminophen (TYLENOL) 500 MG tablet, Take 1 tablet by mouth Every 6 (Six) Hours As Needed for Mild Pain., Disp: , Rfl:     baclofen (LIORESAL) 10 MG tablet, Take 1 tablet by mouth 3 (Three) Times a Day., Disp: 60 tablet, Rfl: 1    busPIRone (BUSPAR) 10 MG tablet, TAKE 1 TABLET BY MOUTH THREE TIMES DAILY, Disp: 90 tablet, Rfl: 0    desvenlafaxine (PRISTIQ) 50 MG 24 hr tablet, Take 1 tablet by mouth Daily., Disp: 90 tablet, Rfl: 1    ibuprofen (ADVIL,MOTRIN) 200 MG tablet, Take 1 tablet by mouth Every 6 (Six) Hours As Needed for Mild Pain., Disp: , Rfl:     loratadine (CLARITIN) 10 MG tablet, Take 1 tablet by mouth once daily, Disp: 90 tablet, Rfl: 3    norgestimate-ethinyl estradiol (Tri-Sprintec) 0.18/0.215/0.25 MG-35 MCG per tablet, Take 1 tablet by mouth Daily., Disp: 28 tablet, Rfl: 12    ondansetron ODT (ZOFRAN-ODT) 4 MG disintegrating tablet, Place 1 tablet on the tongue Every 8 (Eight) Hours As Needed for Nausea or Vomiting., Disp: 9 tablet, Rfl: 0    pantoprazole (PROTONIX) 20 MG EC tablet, Take 1 tablet by mouth Daily., Disp: 90 tablet, Rfl: 3    propranolol (INDERAL) 20 MG tablet, Take 1 tablet by mouth 3 (Three) Times a Day. Take 1/2 PRN, Disp: 90 tablet, Rfl: 1    Serdexmethylphen-Dexmethylphen (azSTARys) 26.1-5.2 MG capsule, Take 1 capsule by mouth Every Morning., Disp: 30 capsule, Rfl:  "0    Allergies:   Allergies   Allergen Reactions    Augmentin [Amoxicillin-Pot Clavulanate] Rash    Levofloxacin Rash       Results Reviewed: n/a     The following portion of the patient's history were reviewed and updated appropriately: allergies, current and past medications, family history, medical history and social history.    Objective     Vital Signs:   Vitals:    06/17/25 1331   Weight: 128 kg (282 lb)   Height: 172.7 cm (68\")     Body mass index is 42.88 kg/m².     Mental Status Exam:   MENTAL STATUS EXAM   General Appearance:  Cleanly groomed and dressed  Eye Contact:  Good eye contact  Attitude:  Cooperative  Motor Activity:  Normal gait, posture  Muscle Strength:  Normal  Speech:  Normal rate, tone, volume  Language:  Spontaneous  Mood and affect:  Normal, pleasant and depressed  Hopelessness:  Denies  Loneliness: Denies  Thought Process:  Logical  Associations/ Thought Content:  No delusions  Hallucinations:  None  Suicidal Ideations:  Not present  Homicidal Ideation:  Not present  Sensorium:  Alert and clear  Orientation:  Person, place, time and situation  Immediate Recall, Recent, and Remote Memory:  Intact  Attention Span/ Concentration:  Easily distracted  Fund of Knowledge:  Appropriate for age and educational level  Intellectual Functioning:  Average range  Insight:  Good  Judgement:  Good  Reliability:  Good  Impulse Control:  Good        SUICIDE RISK ASSESSMENT/CSSRS:  1. Does patient have thoughts of suicide? no  2. Does patient have intent for suicide? no  3. Does patient have a current plan for suicide? no  4. History of suicide attempts: no  5. Family history of suicide or attempts: no  6. History of violent behaviors towards others or property or thoughts of committing suicide: no  7. History of sexual aggression toward others: no  8. Access to firearms or weapons: no    Labs Reviewed: n/a  UDS Reviewed: n/a  Chart since last visit reviewed: yes    Assessment / Plan    Quality " Measures:  Tobacco cessation: Patient denies tobacco use. No tobacco cessation education necessary.     Depression (PHQ >9): Patient screened positive for depression with a PHQ score of 11. Follow up recommendations include medication management, suicide risk assessment, continued screening score monitoring and supportive care.    Medication Considerations:  Benzo: N/A  Stimulants: CSA up to date and on file    VAMSHI reviewed and appropriate.     Risk Assessment: Risk of self-harm acutely is low. Risk of self-harm chronically is also low, but could be further elevated in the event of treatment noncompliance and/or AODA.    Impression/Formulation:  Patient appeared alert and oriented.  Patient is voluntarily seeking psychiatric care at [facility].  Patient is receptive to assistance with maintaining a stable lifestyle.  Conditions being treated include     ICD-10-CM ICD-9-CM   1. Attention deficit hyperactivity disorder (ADHD), predominantly inattentive type  F90.0 314.00   2. Moderate episode of recurrent major depressive disorder  F33.1 296.32   3. MARY (generalized anxiety disorder)  F41.1 300.02   4. PTSD (post-traumatic stress disorder)  F43.10 309.81   .     Visit Diagnosis/Orders Placed This Visit:  Diagnoses and all orders for this visit:    1. Attention deficit hyperactivity disorder (ADHD), predominantly inattentive type (Primary)  -     Serdexmethylphen-Dexmethylphen (azSTARys) 26.1-5.2 MG capsule; Take 1 capsule by mouth Every Morning.  Dispense: 30 capsule; Refill: 0    2. Moderate episode of recurrent major depressive disorder  -     desvenlafaxine (PRISTIQ) 50 MG 24 hr tablet; Take 1 tablet by mouth Daily.  Dispense: 90 tablet; Refill: 1    3. MARY (generalized anxiety disorder)  -     desvenlafaxine (PRISTIQ) 50 MG 24 hr tablet; Take 1 tablet by mouth Daily.  Dispense: 90 tablet; Refill: 1    4. PTSD (post-traumatic stress disorder)       Assessment & Plan  Content of  Therapy:  During the session, the patient discussed her experiences with various ADHD medications, including Adderall, Focalin, and Ritalin. She reported that Adderall was ineffective and caused side effects, while Focalin was too strong. Ritalin provided some benefits but did not last long enough. The patient and clinician explored different delivery mechanisms for Ritalin, such as Ritalin LA, Metadate CD, Azstarys, Vyvanse, and Jornay. The patient also shared her struggles with anxiety and the impact it has on her daily life. The clinician provided reassurance and discussed the importance of self-help strategies and therapy. Additionally, the patient mentioned experiencing chronic nausea and questioned whether it could be related to her antidepressant medication.    Clinical Impression:  The patient presents with ongoing symptoms of ADHD, which have shown partial improvement with Ritalin. However, the medication's duration of effect is insufficient. Anxiety remains a significant concern, with the patient reporting persistent symptoms despite current management strategies. Chronic nausea is also a notable issue, potentially linked to her antidepressant regimen. The patient appears motivated to find effective treatments and is open to trying new medications and therapeutic approaches.    Therapeutic Intervention:  - Medication management: The patient will start Azstarys 26.1/5.2 mg in the morning to address ADHD symptoms. A prescription for Pristiq 50 mg has been provided, with the option to continue at 25 mg if preferred.  - Self-help strategies: Encouraged the patient to continue mindfulness, meditation, and journaling to manage anxiety.  - Therapy: Discussed the potential benefits of therapy for anxiety and the use of ketamine in conjunction with therapy, though it was not prescribed.    Plan:  - Start Azstarys 26.1/5.2 mg in the morning.  - Continue Pristiq with the option to increase to 100 mg or maintain at 50  mg.  - Discontinue BuSpar to assess its impact on nausea.  - Continue self-help strategies for anxiety.  - Consider therapy for anxiety management.  - Request refills via [employer] and mention any dosage adjustments or alternative plans.    Follow-up:  - Follow-up appointment scheduled in 8 weeks.    Notes & Risk Factors:  - None reported.    Any medications prescribed have been sent electronically to [facility] in [place].      Patient will continue supportive psychotherapy efforts and medications as indicated.  Discussed medication options and treatment plan of prescribed medication(s) as well as the risks, benefits, and potential side effects. Patient will contact this office, call 911 or present to the nearest emergency room should suicidal or homicidal ideations occur. Clinic will obtain release of information for current treatment team for continuity of care as needed. Patient ackowledged and verbally consented to continue with current treatment plan and was educated on the importance of compliance with treatment and follow-up appointments.           VALENTINE Beth  [facility]    This is electronically signed by VALENTINE Beth  06/17/2025 13:34 EDT

## 2025-06-17 NOTE — PATIENT INSTRUCTIONS
Books:  The Making of [employer] Woman[employer], Barr  Internal Family Systems Therapy, book is No Bad Parts, Jerrod Fan    Apps:  Betwixt  Mood Bloom

## 2025-07-17 ENCOUNTER — TELEPHONE (OUTPATIENT)
Dept: INTERNAL MEDICINE | Facility: CLINIC | Age: 25
End: 2025-07-17
Payer: COMMERCIAL

## 2025-07-17 DIAGNOSIS — F90.0 ATTENTION DEFICIT HYPERACTIVITY DISORDER (ADHD), PREDOMINANTLY INATTENTIVE TYPE: Primary | ICD-10-CM

## 2025-07-17 RX ORDER — METHYLPHENIDATE HYDROCHLORIDE 27 MG/1
27 TABLET ORAL EVERY MORNING
Qty: 30 TABLET | Refills: 0 | Status: SHIPPED | OUTPATIENT
Start: 2025-07-17

## 2025-07-17 NOTE — TELEPHONE ENCOUNTER
Called patient back to discuss options. No answer; left voicemail with plan.  We will discontinue Azstarys and send prescription for Concerta 27 mg daily.

## 2025-07-17 NOTE — TELEPHONE ENCOUNTER
Pt called and states that she can not afford the Serdexmethylphen-Dexmethylphen (azSTARys). She does not have ins and the coupon that was provided you can only use with ins. She wants to know what else she can do? Please advise.

## 2025-08-12 ENCOUNTER — OFFICE VISIT (OUTPATIENT)
Dept: BEHAVIORAL HEALTH | Facility: CLINIC | Age: 25
End: 2025-08-12
Payer: COMMERCIAL

## 2025-08-12 VITALS
SYSTOLIC BLOOD PRESSURE: 124 MMHG | WEIGHT: 287.2 LBS | BODY MASS INDEX: 43.53 KG/M2 | DIASTOLIC BLOOD PRESSURE: 70 MMHG | HEIGHT: 68 IN

## 2025-08-12 DIAGNOSIS — F41.1 GAD (GENERALIZED ANXIETY DISORDER): ICD-10-CM

## 2025-08-12 DIAGNOSIS — F43.10 PTSD (POST-TRAUMATIC STRESS DISORDER): ICD-10-CM

## 2025-08-12 DIAGNOSIS — F90.0 ATTENTION DEFICIT HYPERACTIVITY DISORDER (ADHD), PREDOMINANTLY INATTENTIVE TYPE: Primary | ICD-10-CM

## 2025-08-12 DIAGNOSIS — F33.1 MODERATE EPISODE OF RECURRENT MAJOR DEPRESSIVE DISORDER: ICD-10-CM

## 2025-08-12 PROCEDURE — 96127 BRIEF EMOTIONAL/BEHAV ASSMT: CPT

## 2025-08-12 PROCEDURE — 99214 OFFICE O/P EST MOD 30 MIN: CPT

## 2025-08-12 RX ORDER — BUSPIRONE HYDROCHLORIDE 10 MG/1
10 TABLET ORAL 3 TIMES DAILY
Qty: 90 TABLET | Refills: 1 | Status: SHIPPED | OUTPATIENT
Start: 2025-08-12

## 2025-08-12 RX ORDER — SERDEXMETHYLPHENIDATE AND DEXMETHYLPHENIDATE 5.2; 26.1 MG/1; MG/1
1 CAPSULE ORAL DAILY
Qty: 30 CAPSULE | Refills: 0 | Status: SHIPPED | OUTPATIENT
Start: 2025-08-12 | End: 2025-08-29 | Stop reason: SDUPTHER

## 2025-08-25 ENCOUNTER — PRIOR AUTHORIZATION (OUTPATIENT)
Age: 25
End: 2025-08-25
Payer: COMMERCIAL

## 2025-08-25 PROBLEM — F41.1 GAD (GENERALIZED ANXIETY DISORDER): Status: ACTIVE | Noted: 2025-08-25

## 2025-08-29 DIAGNOSIS — F90.0 ATTENTION DEFICIT HYPERACTIVITY DISORDER (ADHD), PREDOMINANTLY INATTENTIVE TYPE: ICD-10-CM

## 2025-08-29 RX ORDER — SERDEXMETHYLPHENIDATE AND DEXMETHYLPHENIDATE 5.2; 26.1 MG/1; MG/1
1 CAPSULE ORAL DAILY
Qty: 30 CAPSULE | Refills: 0 | Status: SHIPPED | OUTPATIENT
Start: 2025-08-29